# Patient Record
Sex: FEMALE | Race: BLACK OR AFRICAN AMERICAN | NOT HISPANIC OR LATINO | URBAN - METROPOLITAN AREA
[De-identification: names, ages, dates, MRNs, and addresses within clinical notes are randomized per-mention and may not be internally consistent; named-entity substitution may affect disease eponyms.]

---

## 2018-03-12 ENCOUNTER — INPATIENT (INPATIENT)
Facility: HOSPITAL | Age: 68
LOS: 20 days | Discharge: HOME | DRG: 885 | End: 2018-04-02
Attending: PSYCHIATRY & NEUROLOGY | Admitting: PSYCHIATRY & NEUROLOGY
Payer: MEDICARE

## 2018-03-12 VITALS
HEART RATE: 133 BPM | DIASTOLIC BLOOD PRESSURE: 99 MMHG | RESPIRATION RATE: 24 BRPM | SYSTOLIC BLOOD PRESSURE: 137 MMHG | TEMPERATURE: 98 F | OXYGEN SATURATION: 98 %

## 2018-03-12 DIAGNOSIS — F33.9 MAJOR DEPRESSIVE DISORDER, RECURRENT, UNSPECIFIED: ICD-10-CM

## 2018-03-12 DIAGNOSIS — R69 ILLNESS, UNSPECIFIED: ICD-10-CM

## 2018-03-12 DIAGNOSIS — Z98.89 OTHER SPECIFIED POSTPROCEDURAL STATES: Chronic | ICD-10-CM

## 2018-03-12 LAB
ALBUMIN SERPL ELPH-MCNC: 4.1 G/DL — SIGNIFICANT CHANGE UP (ref 3.3–5)
ALP SERPL-CCNC: 73 U/L — SIGNIFICANT CHANGE UP (ref 40–120)
ALT FLD-CCNC: SIGNIFICANT CHANGE UP U/L (ref 10–45)
ANION GAP SERPL CALC-SCNC: 18 MMOL/L — HIGH (ref 5–17)
APPEARANCE UR: (no result)
APTT BLD: 25.3 SEC — LOW (ref 27.5–37.4)
AST SERPL-CCNC: SIGNIFICANT CHANGE UP U/L (ref 10–40)
BACTERIA # UR AUTO: PRESENT /HPF
BASOPHILS NFR BLD AUTO: 0.2 % — SIGNIFICANT CHANGE UP (ref 0–2)
BILIRUB SERPL-MCNC: 0.4 MG/DL — SIGNIFICANT CHANGE UP (ref 0.2–1.2)
BILIRUB UR-MCNC: NEGATIVE — SIGNIFICANT CHANGE UP
BUN SERPL-MCNC: 9 MG/DL — SIGNIFICANT CHANGE UP (ref 7–23)
CALCIUM SERPL-MCNC: 9.7 MG/DL — SIGNIFICANT CHANGE UP (ref 8.4–10.5)
CHLORIDE SERPL-SCNC: 102 MMOL/L — SIGNIFICANT CHANGE UP (ref 96–108)
CO2 SERPL-SCNC: 15 MMOL/L — LOW (ref 22–31)
COLOR SPEC: YELLOW — SIGNIFICANT CHANGE UP
CREAT SERPL-MCNC: 0.78 MG/DL — SIGNIFICANT CHANGE UP (ref 0.5–1.3)
DIFF PNL FLD: NEGATIVE — SIGNIFICANT CHANGE UP
EOSINOPHIL NFR BLD AUTO: 1.6 % — SIGNIFICANT CHANGE UP (ref 0–6)
EPI CELLS # UR: (no result) /HPF (ref 0–5)
GLUCOSE SERPL-MCNC: 103 MG/DL — HIGH (ref 70–99)
GLUCOSE UR QL: NEGATIVE — SIGNIFICANT CHANGE UP
HCT VFR BLD CALC: 42.1 % — SIGNIFICANT CHANGE UP (ref 34.5–45)
HGB BLD-MCNC: 14.5 G/DL — SIGNIFICANT CHANGE UP (ref 11.5–15.5)
INR BLD: 1.1 — SIGNIFICANT CHANGE UP (ref 0.88–1.16)
KETONES UR-MCNC: 15 MG/DL
LEUKOCYTE ESTERASE UR-ACNC: (no result)
LYMPHOCYTES # BLD AUTO: 32.7 % — SIGNIFICANT CHANGE UP (ref 13–44)
MCHC RBC-ENTMCNC: 33.6 PG — SIGNIFICANT CHANGE UP (ref 27–34)
MCHC RBC-ENTMCNC: 34.4 G/DL — SIGNIFICANT CHANGE UP (ref 32–36)
MCV RBC AUTO: 97.7 FL — SIGNIFICANT CHANGE UP (ref 80–100)
MONOCYTES NFR BLD AUTO: 7.2 % — SIGNIFICANT CHANGE UP (ref 2–14)
NEUTROPHILS NFR BLD AUTO: 58.3 % — SIGNIFICANT CHANGE UP (ref 43–77)
NITRITE UR-MCNC: NEGATIVE — SIGNIFICANT CHANGE UP
PH UR: 6 — SIGNIFICANT CHANGE UP (ref 5–8)
PLATELET # BLD AUTO: 433 K/UL — HIGH (ref 150–400)
POTASSIUM SERPL-MCNC: SIGNIFICANT CHANGE UP MMOL/L (ref 3.5–5.3)
POTASSIUM SERPL-SCNC: SIGNIFICANT CHANGE UP MMOL/L (ref 3.5–5.3)
PROT SERPL-MCNC: 8.1 G/DL — SIGNIFICANT CHANGE UP (ref 6–8.3)
PROT UR-MCNC: NEGATIVE MG/DL — SIGNIFICANT CHANGE UP
PROTHROM AB SERPL-ACNC: 12.2 SEC — SIGNIFICANT CHANGE UP (ref 9.8–12.7)
RBC # BLD: 4.31 M/UL — SIGNIFICANT CHANGE UP (ref 3.8–5.2)
RBC # FLD: 15.3 % — SIGNIFICANT CHANGE UP (ref 10.3–16.9)
RBC CASTS # UR COMP ASSIST: < 5 /HPF — SIGNIFICANT CHANGE UP
SODIUM SERPL-SCNC: 135 MMOL/L — SIGNIFICANT CHANGE UP (ref 135–145)
SP GR SPEC: 1.01 — SIGNIFICANT CHANGE UP (ref 1–1.03)
UROBILINOGEN FLD QL: 0.2 E.U./DL — SIGNIFICANT CHANGE UP
WBC # BLD: 8.2 K/UL — SIGNIFICANT CHANGE UP (ref 3.8–10.5)
WBC # FLD AUTO: 8.2 K/UL — SIGNIFICANT CHANGE UP (ref 3.8–10.5)
WBC UR QL: > 10 /HPF

## 2018-03-12 PROCEDURE — 99233 SBSQ HOSP IP/OBS HIGH 50: CPT

## 2018-03-12 PROCEDURE — 93010 ELECTROCARDIOGRAM REPORT: CPT

## 2018-03-12 PROCEDURE — 71045 X-RAY EXAM CHEST 1 VIEW: CPT | Mod: 26

## 2018-03-12 PROCEDURE — 90792 PSYCH DIAG EVAL W/MED SRVCS: CPT

## 2018-03-12 PROCEDURE — 99285 EMERGENCY DEPT VISIT HI MDM: CPT | Mod: 25

## 2018-03-12 RX ORDER — TRAMADOL HYDROCHLORIDE 50 MG/1
50 TABLET ORAL
Qty: 0 | Refills: 0 | Status: DISCONTINUED | OUTPATIENT
Start: 2018-03-12 | End: 2018-03-19

## 2018-03-12 RX ORDER — MEGESTROL ACETATE 40 MG/ML
40 SUSPENSION ORAL DAILY
Qty: 0 | Refills: 0 | Status: DISCONTINUED | OUTPATIENT
Start: 2018-03-12 | End: 2018-03-13

## 2018-03-12 RX ORDER — FLUTICASONE PROPIONATE 50 MCG
1 SPRAY, SUSPENSION NASAL
Qty: 0 | Refills: 0 | Status: DISCONTINUED | OUTPATIENT
Start: 2018-03-12 | End: 2018-04-02

## 2018-03-12 RX ORDER — TIOTROPIUM BROMIDE 18 UG/1
1 CAPSULE ORAL; RESPIRATORY (INHALATION) DAILY
Qty: 0 | Refills: 0 | Status: DISCONTINUED | OUTPATIENT
Start: 2018-03-12 | End: 2018-04-02

## 2018-03-12 RX ORDER — GABAPENTIN 400 MG/1
300 CAPSULE ORAL THREE TIMES A DAY
Qty: 0 | Refills: 0 | Status: DISCONTINUED | OUTPATIENT
Start: 2018-03-12 | End: 2018-03-16

## 2018-03-12 RX ORDER — TRAZODONE HCL 50 MG
100 TABLET ORAL AT BEDTIME
Qty: 0 | Refills: 0 | Status: DISCONTINUED | OUTPATIENT
Start: 2018-03-12 | End: 2018-04-02

## 2018-03-12 RX ORDER — ASCORBIC ACID 60 MG
500 TABLET,CHEWABLE ORAL DAILY
Qty: 0 | Refills: 0 | Status: DISCONTINUED | OUTPATIENT
Start: 2018-03-12 | End: 2018-04-02

## 2018-03-12 RX ORDER — AMLODIPINE BESYLATE 2.5 MG/1
5 TABLET ORAL DAILY
Qty: 0 | Refills: 0 | Status: DISCONTINUED | OUTPATIENT
Start: 2018-03-12 | End: 2018-04-02

## 2018-03-12 RX ORDER — IPRATROPIUM/ALBUTEROL SULFATE 18-103MCG
3 AEROSOL WITH ADAPTER (GRAM) INHALATION
Qty: 0 | Refills: 0 | Status: COMPLETED | OUTPATIENT
Start: 2018-03-12 | End: 2018-03-12

## 2018-03-12 RX ORDER — CELECOXIB 200 MG/1
100 CAPSULE ORAL
Qty: 0 | Refills: 0 | Status: DISCONTINUED | OUTPATIENT
Start: 2018-03-12 | End: 2018-04-02

## 2018-03-12 RX ORDER — BUDESONIDE AND FORMOTEROL FUMARATE DIHYDRATE 160; 4.5 UG/1; UG/1
2 AEROSOL RESPIRATORY (INHALATION)
Qty: 0 | Refills: 0 | Status: DISCONTINUED | OUTPATIENT
Start: 2018-03-12 | End: 2018-04-02

## 2018-03-12 RX ORDER — LORATADINE 10 MG/1
10 TABLET ORAL DAILY
Qty: 0 | Refills: 0 | Status: DISCONTINUED | OUTPATIENT
Start: 2018-03-12 | End: 2018-04-02

## 2018-03-12 RX ORDER — ALBUTEROL 90 UG/1
2 AEROSOL, METERED ORAL EVERY 6 HOURS
Qty: 0 | Refills: 0 | Status: DISCONTINUED | OUTPATIENT
Start: 2018-03-12 | End: 2018-04-02

## 2018-03-12 RX ORDER — SODIUM CHLORIDE 9 MG/ML
1000 INJECTION INTRAMUSCULAR; INTRAVENOUS; SUBCUTANEOUS ONCE
Qty: 0 | Refills: 0 | Status: COMPLETED | OUTPATIENT
Start: 2018-03-12 | End: 2018-03-12

## 2018-03-12 RX ORDER — MIRTAZAPINE 45 MG/1
15 TABLET, ORALLY DISINTEGRATING ORAL AT BEDTIME
Qty: 0 | Refills: 0 | Status: DISCONTINUED | OUTPATIENT
Start: 2018-03-12 | End: 2018-03-14

## 2018-03-12 RX ORDER — ATORVASTATIN CALCIUM 80 MG/1
10 TABLET, FILM COATED ORAL AT BEDTIME
Qty: 0 | Refills: 0 | Status: DISCONTINUED | OUTPATIENT
Start: 2018-03-12 | End: 2018-04-02

## 2018-03-12 RX ORDER — QUETIAPINE FUMARATE 200 MG/1
200 TABLET, FILM COATED ORAL AT BEDTIME
Qty: 0 | Refills: 0 | Status: DISCONTINUED | OUTPATIENT
Start: 2018-03-12 | End: 2018-03-14

## 2018-03-12 RX ADMIN — GABAPENTIN 300 MILLIGRAM(S): 400 CAPSULE ORAL at 14:46

## 2018-03-12 RX ADMIN — Medication 125 MILLIGRAM(S): at 11:36

## 2018-03-12 RX ADMIN — SODIUM CHLORIDE 2000 MILLILITER(S): 9 INJECTION INTRAMUSCULAR; INTRAVENOUS; SUBCUTANEOUS at 11:35

## 2018-03-12 RX ADMIN — Medication 100 MILLIGRAM(S): at 21:58

## 2018-03-12 RX ADMIN — TRAMADOL HYDROCHLORIDE 50 MILLIGRAM(S): 50 TABLET ORAL at 14:46

## 2018-03-12 RX ADMIN — AMLODIPINE BESYLATE 5 MILLIGRAM(S): 2.5 TABLET ORAL at 14:46

## 2018-03-12 RX ADMIN — Medication 500 MILLIGRAM(S): at 14:46

## 2018-03-12 RX ADMIN — ATORVASTATIN CALCIUM 10 MILLIGRAM(S): 80 TABLET, FILM COATED ORAL at 21:58

## 2018-03-12 RX ADMIN — MIRTAZAPINE 15 MILLIGRAM(S): 45 TABLET, ORALLY DISINTEGRATING ORAL at 21:58

## 2018-03-12 RX ADMIN — Medication 3 MILLILITER(S): at 11:36

## 2018-03-12 RX ADMIN — GABAPENTIN 300 MILLIGRAM(S): 400 CAPSULE ORAL at 21:58

## 2018-03-12 RX ADMIN — Medication 3 MILLILITER(S): at 12:24

## 2018-03-12 RX ADMIN — Medication 3 MILLILITER(S): at 12:04

## 2018-03-12 RX ADMIN — QUETIAPINE FUMARATE 200 MILLIGRAM(S): 200 TABLET, FILM COATED ORAL at 21:58
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
## 2018-03-12 NOTE — ED ADULT TRIAGE NOTE - OTHER COMPLAINTS
Patient reports suicidal ideations, placed on 1:1. Denies any chest pain. Reports decreased appetite and generalized weakness.

## 2018-03-12 NOTE — ED PROVIDER NOTE - PHYSICAL EXAMINATION
VITAL SIGNS: I have reviewed nursing notes and confirm.  CONSTITUTIONAL: Well-developed; well-nourished thin female sitting up in stretcher, tearful, though appropriately following commands, non-toxic; in no acute distress.  SKIN: Agree with RN documentation regarding decubitus evaluation. Remainder of skin exam is warm and dry, no acute rash.  HEAD: Normocephalic; atraumatic.  EYES: PERRL, EOM intact; conjunctiva and sclera clear.  ENT: No nasal discharge; airway clear.  NECK: Supple; non tender.  CARD: S1, S2 normal; no murmurs, gallops, or rubs. + tachycardia to 110s  RESP: CTA b/l w/good excursion, no accessory muscle recruitment  ABD: Normal bowel sounds; soft; non-distended; non-tender  EXT: Normal ROM. No clubbing, cyanosis or edema.  LYMPH: No acute cervical adenopathy.  NEURO: Alert, oriented. Grossly unremarkable.  PSYCH: Cooperative, appropriate.

## 2018-03-12 NOTE — ED BEHAVIORAL HEALTH ASSESSMENT NOTE - AXIS IV
Economic problems/Problems with primary support/Problem related to social environment/Educational problems/Occupational problems/Other psychosocial and environmental problems

## 2018-03-12 NOTE — ED ADULT NURSE NOTE - CHPI ED SYMPTOMS NEG
no wheezing/no edema/no fever/no body aches/no chest pain/no headache/no chills/no diaphoresis/no hemoptysis/no cough

## 2018-03-12 NOTE — ED BEHAVIORAL HEALTH ASSESSMENT NOTE - PSYCHIATRIC ISSUES AND PLAN (INCLUDE STANDING AND PRN MEDICATION)
Remeron 15 mg qhs, seroquel 200 mg qhs. Contact [facility] for collateral info
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
## 2018-03-12 NOTE — ED ADULT NURSE NOTE - OBJECTIVE STATEMENT
67y F, A&ox3, tearful and anxious, with times of hyper ventilation, anxious appearing, presents to ed for depression and "wanting to speak to psychiatrist I just don't want to live." Pt on 1:1. Pt reports "My mother  and I was going through her stuff." Reports "I also left my copd meds at home. Denies cp, n/v, fever, chills, abdominal pain, urinary s/s. LUngs clear bilateral. No jvd, no edema. EKG performed, Labs drawn. Pending evaluation. reports decreased po intake, decreased activity, depression, denies HI. No drug use nor etoh use. Will continue to monitor.

## 2018-03-12 NOTE — ED BEHAVIORAL HEALTH ASSESSMENT NOTE - OTHER PAST PSYCHIATRIC HISTORY (INCLUDE DETAILS REGARDING ONSET, COURSE OF ILLNESS, INPATIENT/OUTPATIENT TREATMENT)
Most recent admission to [facility] in Nov-Dec 2017  Admission on [facility] in late March '16 for a week for suicidal ideation and depression.  Pt states she stopped eating in the past as a passive way to die

## 2018-03-12 NOTE — ED PROVIDER NOTE - OBJECTIVE STATEMENT
68 y/o F w/hx MDD w/several psych admissions on mirtazipine/quetiapine, mild COPD (former smoker), chronic back pain w/several fusion surgeries on tramadol, p/w feelings of anhedonia, lack of appetite, feeling depressed and having thoughts of suicide. No direct plan. No AH/VH. No drug use/alcohol use. She states that she has baseline exertional SOB related to her COPD. Denies SOB or CP at this time.

## 2018-03-12 NOTE — ED BEHAVIORAL HEALTH ASSESSMENT NOTE - HPI (INCLUDE ILLNESS QUALITY, SEVERITY, DURATION, TIMING, CONTEXT, MODIFYING FACTORS, ASSOCIATED SIGNS AND SYMPTOMS)
Pt is a 66 yo single  female domiciled alone since her mother passed away in 12/15, with long history of depression and anxiety, several past psychiatric admissions, most recent one at [facility] in 2017, prior admission to [facility] in  now presents stating she has been feeling progressively more depressed since January. Pt states that she stopped eating about two weeks ago, hoping she would die. She has been having difficulty getting out of bed going to the bathroom. "I know things are getting bad when I have to negotiate with myself if I should get up to go to the bathroom". Pt is unable to identify reasons for living. She states she took care of her mother (who  at [facility] at the age of 95) for twenty years which isolated her. She states she is "alone" and does not have any friends or family. Pt reports poor sleep, poor energy level, amotivation, worthlessness. She states she wants to be in a "safe place". There is no evidence of manic or psychotic symptoms. Pt however is somewhat oddly related and appears severely underweight.   Pt identifies going through her mother's belongings as a triggering event. She also identifies her social isolation to be a contributor to her depression.     Pt states that she had one intake appt at the [facility] after being discharged from [facility]. She however has not met with her regular treatment team as of now.

## 2018-03-12 NOTE — ED PROVIDER NOTE - MEDICAL DECISION MAKING DETAILS
+ MDD w/SI, not concerned for intoxication, lungs clear w/unremarkable CXR (no infiltrates or effusions), medically cleared, seen by psych and accepted to the inpt psych sol for severe depressive episode.

## 2018-03-12 NOTE — ED BEHAVIORAL HEALTH ASSESSMENT NOTE - SUICIDE RISK FACTORS
History of abuse/trauma/Mood episode/Access to means (pills, firearms, etc.)/None Known/Hopelessness/Global insomnia/Anhedonia

## 2018-03-12 NOTE — ED ADULT NURSE NOTE - PMH
Back pain    Depression    Emphysema lung    Hyperlipidemia    Hypertension    Hypokalemia    Knee pain, chronic    Lung nodules

## 2018-03-13 PROCEDURE — 99231 SBSQ HOSP IP/OBS SF/LOW 25: CPT

## 2018-03-13 RX ORDER — MEGESTROL ACETATE 40 MG/ML
400 SUSPENSION ORAL DAILY
Qty: 0 | Refills: 0 | Status: DISCONTINUED | OUTPATIENT
Start: 2018-03-13 | End: 2018-03-15

## 2018-03-13 RX ADMIN — GABAPENTIN 300 MILLIGRAM(S): 400 CAPSULE ORAL at 22:00

## 2018-03-13 RX ADMIN — CELECOXIB 100 MILLIGRAM(S): 200 CAPSULE ORAL at 22:02

## 2018-03-13 RX ADMIN — ATORVASTATIN CALCIUM 10 MILLIGRAM(S): 80 TABLET, FILM COATED ORAL at 22:01

## 2018-03-13 RX ADMIN — TRAMADOL HYDROCHLORIDE 50 MILLIGRAM(S): 50 TABLET ORAL at 06:02

## 2018-03-13 RX ADMIN — Medication 500 MILLIGRAM(S): at 10:43

## 2018-03-13 RX ADMIN — CELECOXIB 100 MILLIGRAM(S): 200 CAPSULE ORAL at 07:42

## 2018-03-13 RX ADMIN — AMLODIPINE BESYLATE 5 MILLIGRAM(S): 2.5 TABLET ORAL at 06:02

## 2018-03-13 RX ADMIN — GABAPENTIN 300 MILLIGRAM(S): 400 CAPSULE ORAL at 13:09

## 2018-03-13 RX ADMIN — QUETIAPINE FUMARATE 200 MILLIGRAM(S): 200 TABLET, FILM COATED ORAL at 22:01

## 2018-03-13 RX ADMIN — Medication 100 MILLIGRAM(S): at 22:00

## 2018-03-13 RX ADMIN — MIRTAZAPINE 15 MILLIGRAM(S): 45 TABLET, ORALLY DISINTEGRATING ORAL at 22:01

## 2018-03-13 RX ADMIN — BUDESONIDE AND FORMOTEROL FUMARATE DIHYDRATE 2 PUFF(S): 160; 4.5 AEROSOL RESPIRATORY (INHALATION) at 18:32

## 2018-03-13 RX ADMIN — CELECOXIB 100 MILLIGRAM(S): 200 CAPSULE ORAL at 08:12

## 2018-03-13 RX ADMIN — GABAPENTIN 300 MILLIGRAM(S): 400 CAPSULE ORAL at 06:02

## 2018-03-13 RX ADMIN — TRAMADOL HYDROCHLORIDE 50 MILLIGRAM(S): 50 TABLET ORAL at 07:02

## 2018-03-13 RX ADMIN — CELECOXIB 100 MILLIGRAM(S): 200 CAPSULE ORAL at 23:03

## 2018-03-13 RX ADMIN — BUDESONIDE AND FORMOTEROL FUMARATE DIHYDRATE 2 PUFF(S): 160; 4.5 AEROSOL RESPIRATORY (INHALATION) at 22:06
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
## 2018-03-13 NOTE — BEHAVIORAL HEALTH ASSESSMENT NOTE - NSBHCHARTREVIEWVS_PSY_A_CORE FT
Vital Signs Last 24 Hrs  T(C): 37 (13 Mar 2018 17:00), Max: 37 (13 Mar 2018 17:00)  T(F): 98.6 (13 Mar 2018 17:00), Max: 98.6 (13 Mar 2018 17:00)  HR: 99 (13 Mar 2018 17:00) (99 - 117)  BP: 124/81 (13 Mar 2018 17:00) (124/81 - 130/79)  BP(mean): --  RR: 18 (13 Mar 2018 17:00) (18 - 20)  SpO2: --

## 2018-03-13 NOTE — BEHAVIORAL HEALTH ASSESSMENT NOTE - NSBHADMITIPSTRENGTH_PSY_A_CORE
Has access to housing/residential stability/Good impulse control/Cooperative with treatment/Intelligent/Knowledge of medications/Motivated and ready for change

## 2018-03-13 NOTE — BEHAVIORAL HEALTH ASSESSMENT NOTE - NSBHCHARTREVIEWLAB_PSY_A_CORE FT
14.5   8.2   )-----------( 433      ( 12 Mar 2018 10:46 )               42.1       03-12    135  |  102  |  9   ----------------------------<  103<H>  see note   |  15<L>  |  0.78    Ca    9.7      12 Mar 2018 10:46    TPro  8.1  /  Alb  4.1  /  TBili  0.4  /  DBili  x   /  AST  see note  /  ALT  see note  /  AlkPhos  73  03-12
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
## 2018-03-13 NOTE — BEHAVIORAL HEALTH ASSESSMENT NOTE - HPI (INCLUDE ILLNESS QUALITY, SEVERITY, DURATION, TIMING, CONTEXT, MODIFYING FACTORS, ASSOCIATED SIGNS AND SYMPTOMS)
Pt is a 68 yo single  female domiciled alone since her mother passed away in 12/15, with long history of depression and anxiety, several past psychiatric admissions, most recent one at [facility] in 2017, prior admission to [facility] in  now presents stating she has been feeling progressively more depressed since January. Pt states that she stopped eating about two weeks ago, hoping she would die. She has been having difficulty getting out of bed going to the bathroom. "I know things are getting bad when I have to negotiate with myself if I should get up to go to the bathroom". Pt is unable to identify reasons for living. She states she took care of her mother (who  at [facility] at the age of 95) for twenty years which isolated her. She states she is "alone" and does not have any friends or family. Pt reports poor sleep, poor energy level, amotivation, worthlessness. She states she wants to be in a "safe place". There is no evidence of manic or psychotic symptoms. Pt however is somewhat oddly related and appears severely underweight.   Pt identifies going through her mother's belongings as a triggering event. She also identifies her social isolation to be a contributor to her depression.     Pt states that she had one intake appt at the [facility] after being discharged from [facility]. She however has not met with her regular treatment team as of now.

## 2018-03-14 DIAGNOSIS — J43.9 EMPHYSEMA, UNSPECIFIED: ICD-10-CM

## 2018-03-14 DIAGNOSIS — M54.9 DORSALGIA, UNSPECIFIED: ICD-10-CM

## 2018-03-14 DIAGNOSIS — E78.5 HYPERLIPIDEMIA, UNSPECIFIED: ICD-10-CM

## 2018-03-14 DIAGNOSIS — I10 ESSENTIAL (PRIMARY) HYPERTENSION: ICD-10-CM

## 2018-03-14 PROCEDURE — 99231 SBSQ HOSP IP/OBS SF/LOW 25: CPT

## 2018-03-14 PROCEDURE — 99222 1ST HOSP IP/OBS MODERATE 55: CPT

## 2018-03-14 RX ORDER — FOLIC ACID 0.8 MG
1 TABLET ORAL DAILY
Qty: 0 | Refills: 0 | Status: DISCONTINUED | OUTPATIENT
Start: 2018-03-14 | End: 2018-04-02

## 2018-03-14 RX ORDER — QUETIAPINE FUMARATE 200 MG/1
150 TABLET, FILM COATED ORAL AT BEDTIME
Qty: 0 | Refills: 0 | Status: DISCONTINUED | OUTPATIENT
Start: 2018-03-14 | End: 2018-03-16

## 2018-03-14 RX ORDER — QUETIAPINE FUMARATE 200 MG/1
50 TABLET, FILM COATED ORAL
Qty: 0 | Refills: 0 | Status: DISCONTINUED | OUTPATIENT
Start: 2018-03-14 | End: 2018-03-16

## 2018-03-14 RX ORDER — THIAMINE MONONITRATE (VIT B1) 100 MG
100 TABLET ORAL DAILY
Qty: 0 | Refills: 0 | Status: DISCONTINUED | OUTPATIENT
Start: 2018-03-14 | End: 2018-04-02

## 2018-03-14 RX ADMIN — ATORVASTATIN CALCIUM 10 MILLIGRAM(S): 80 TABLET, FILM COATED ORAL at 21:30

## 2018-03-14 RX ADMIN — GABAPENTIN 300 MILLIGRAM(S): 400 CAPSULE ORAL at 21:30

## 2018-03-14 RX ADMIN — Medication 500 MILLIGRAM(S): at 16:18

## 2018-03-14 RX ADMIN — Medication 1 MILLIGRAM(S): at 16:18

## 2018-03-14 RX ADMIN — Medication 100 MILLIGRAM(S): at 21:31

## 2018-03-14 RX ADMIN — BUDESONIDE AND FORMOTEROL FUMARATE DIHYDRATE 2 PUFF(S): 160; 4.5 AEROSOL RESPIRATORY (INHALATION) at 16:20

## 2018-03-14 RX ADMIN — GABAPENTIN 300 MILLIGRAM(S): 400 CAPSULE ORAL at 06:53

## 2018-03-14 RX ADMIN — Medication 100 MILLIGRAM(S): at 16:18

## 2018-03-14 RX ADMIN — TRAMADOL HYDROCHLORIDE 50 MILLIGRAM(S): 50 TABLET ORAL at 16:18

## 2018-03-14 RX ADMIN — TRAMADOL HYDROCHLORIDE 50 MILLIGRAM(S): 50 TABLET ORAL at 16:45

## 2018-03-14 RX ADMIN — MEGESTROL ACETATE 400 MILLIGRAM(S): 40 SUSPENSION ORAL at 16:16

## 2018-03-14 RX ADMIN — Medication 1 TABLET(S): at 16:18

## 2018-03-14 RX ADMIN — CELECOXIB 100 MILLIGRAM(S): 200 CAPSULE ORAL at 06:53

## 2018-03-14 RX ADMIN — CELECOXIB 100 MILLIGRAM(S): 200 CAPSULE ORAL at 07:37

## 2018-03-14 RX ADMIN — QUETIAPINE FUMARATE 150 MILLIGRAM(S): 200 TABLET, FILM COATED ORAL at 21:31

## 2018-03-14 RX ADMIN — AMLODIPINE BESYLATE 5 MILLIGRAM(S): 2.5 TABLET ORAL at 06:53

## 2018-03-14 RX ADMIN — QUETIAPINE FUMARATE 50 MILLIGRAM(S): 200 TABLET, FILM COATED ORAL at 21:31

## 2018-03-14 RX ADMIN — CELECOXIB 100 MILLIGRAM(S): 200 CAPSULE ORAL at 16:17

## 2018-03-14 RX ADMIN — CELECOXIB 100 MILLIGRAM(S): 200 CAPSULE ORAL at 16:45

## 2018-03-14 NOTE — PROGRESS NOTE BEHAVIORAL HEALTH - PROBLEM SELECTOR PLAN 1
-Discontinued Remeron 15 mg as patient was complaining of sedation  -Changed Seroquel to 50 mg q1pm and 150 mg qHS  -Continue Trazodone 100 mg qHS  -Continue megestrol 40 mg for appetite stimulation and multivitamin, thiamine, and folic acid for malnourishment

## 2018-03-14 NOTE — PROGRESS NOTE BEHAVIORAL HEALTH - SUMMARY
66 yo  female, domiciled alone with past psychiatric history of MDD and anxiety admitted for worsening depression and passive suicidal ideation through decreased PO intake. She is still depressed and endorsing anhedonia. Patient continues to warrant inpatient hospitalization for medication management, symptom stabilization, and safety. Given depression, will titrate Seroquel dose and monitor for side effects. Education given regarding compliance with treatment plan and good behavior. Will encourage verbalization of feelings and alternative coping skills.

## 2018-03-14 NOTE — PROGRESS NOTE BEHAVIORAL HEALTH - NSBHFUPINTERVALHXFT_PSY_A_CORE
Case was discussed with treatment team and chart was reviewed.  Patient was compliant with all meds. Case was discussed with treatment team and chart was reviewed.  Patient was compliant with all meds. As per nursing report, patient continues to have poor PO intake, but it has slightly improved. Nutrition consult was placed. Patient reports that she was able to eat more at breakfast and lunch today. However, she has been feeling more tired this afternoon even though she slept well last night and finds it difficult to concentrate in groups. She describes her current mood as depressed and afraid, and she finds herself getting tearful without any triggers. She denies active suicidal ideation and has improved insight regarding her PO intake. Denies any manic/hypomanic symptoms, paranoid thoughts/delusions, perceptual disturbances, HI.    Medical problems: Medicine consult was placed to review patient's medications and make adjustments as necessary. Patient's right wrist active ROM has slightly improved, but she is still unable to actively flex her fingers. Radial pulse is intact and cap refill is brisk.

## 2018-03-14 NOTE — PROGRESS NOTE BEHAVIORAL HEALTH - RISK ASSESSMENT
Patient recently lost her mother and is socially isolated. On the unit, her PO intake has improved, but she still endorses significant depression and is not future-oriented.

## 2018-03-14 NOTE — PROGRESS NOTE BEHAVIORAL HEALTH - NSBHCHARTREVIEWVS_PSY_A_CORE FT
Vital Signs Last 24 Hrs  T(C): 36.4 (14 Mar 2018 07:04), Max: 37 (13 Mar 2018 17:00)  T(F): 97.5 (14 Mar 2018 07:04), Max: 98.6 (13 Mar 2018 17:00)  HR: 117 (14 Mar 2018 07:04) (99 - 117)  BP: 121/82 (14 Mar 2018 07:04) (121/82 - 130/79)  BP(mean): --  RR: 18 (14 Mar 2018 07:04) (18 - 20)  SpO2: --

## 2018-03-15 LAB
ANION GAP SERPL CALC-SCNC: 16 MMOL/L — SIGNIFICANT CHANGE UP (ref 5–17)
BUN SERPL-MCNC: 5 MG/DL — LOW (ref 7–23)
CALCIUM SERPL-MCNC: 9.4 MG/DL — SIGNIFICANT CHANGE UP (ref 8.4–10.5)
CHLORIDE SERPL-SCNC: 104 MMOL/L — SIGNIFICANT CHANGE UP (ref 96–108)
CHOLEST SERPL-MCNC: 96 MG/DL — SIGNIFICANT CHANGE UP (ref 10–199)
CO2 SERPL-SCNC: 21 MMOL/L — LOW (ref 22–31)
CREAT SERPL-MCNC: 0.54 MG/DL — SIGNIFICANT CHANGE UP (ref 0.5–1.3)
GLUCOSE SERPL-MCNC: 102 MG/DL — HIGH (ref 70–99)
HBA1C BLD-MCNC: 5.3 % — SIGNIFICANT CHANGE UP (ref 4–5.6)
HDLC SERPL-MCNC: 30 MG/DL — LOW (ref 40–125)
LIPID PNL WITH DIRECT LDL SERPL: 51 MG/DL — SIGNIFICANT CHANGE UP
MAGNESIUM SERPL-MCNC: 1.6 MG/DL — SIGNIFICANT CHANGE UP (ref 1.6–2.6)
PHOSPHATE SERPL-MCNC: 2.6 MG/DL — SIGNIFICANT CHANGE UP (ref 2.5–4.5)
POTASSIUM SERPL-MCNC: 3.2 MMOL/L — LOW (ref 3.5–5.3)
POTASSIUM SERPL-SCNC: 3.2 MMOL/L — LOW (ref 3.5–5.3)
SODIUM SERPL-SCNC: 141 MMOL/L — SIGNIFICANT CHANGE UP (ref 135–145)
TOTAL CHOLESTEROL/HDL RATIO MEASUREMENT: 3.2 RATIO — LOW (ref 3.3–7.1)
TRIGL SERPL-MCNC: 75 MG/DL — SIGNIFICANT CHANGE UP (ref 10–149)

## 2018-03-15 PROCEDURE — 99231 SBSQ HOSP IP/OBS SF/LOW 25: CPT

## 2018-03-15 PROCEDURE — 99232 SBSQ HOSP IP/OBS MODERATE 35: CPT

## 2018-03-15 RX ORDER — ERGOCALCIFEROL 1.25 MG/1
50000 CAPSULE ORAL
Qty: 0 | Refills: 0 | Status: DISCONTINUED | OUTPATIENT
Start: 2018-03-15 | End: 2018-04-02

## 2018-03-15 RX ORDER — METOPROLOL TARTRATE 50 MG
25 TABLET ORAL DAILY
Qty: 0 | Refills: 0 | Status: DISCONTINUED | OUTPATIENT
Start: 2018-03-15 | End: 2018-04-02

## 2018-03-15 RX ORDER — POTASSIUM CHLORIDE 20 MEQ
40 PACKET (EA) ORAL EVERY 4 HOURS
Qty: 0 | Refills: 0 | Status: COMPLETED | OUTPATIENT
Start: 2018-03-15 | End: 2018-03-15

## 2018-03-15 RX ORDER — MEGESTROL ACETATE 40 MG/ML
400 SUSPENSION ORAL DAILY
Qty: 0 | Refills: 0 | Status: DISCONTINUED | OUTPATIENT
Start: 2018-03-15 | End: 2018-04-02

## 2018-03-15 RX ADMIN — Medication 100 MILLIGRAM(S): at 22:02

## 2018-03-15 RX ADMIN — CELECOXIB 100 MILLIGRAM(S): 200 CAPSULE ORAL at 05:50

## 2018-03-15 RX ADMIN — Medication 1 MILLIGRAM(S): at 11:11

## 2018-03-15 RX ADMIN — CELECOXIB 100 MILLIGRAM(S): 200 CAPSULE ORAL at 15:32

## 2018-03-15 RX ADMIN — Medication 500 MILLIGRAM(S): at 11:11

## 2018-03-15 RX ADMIN — TRAMADOL HYDROCHLORIDE 50 MILLIGRAM(S): 50 TABLET ORAL at 05:41

## 2018-03-15 RX ADMIN — Medication 40 MILLIEQUIVALENT(S): at 11:13

## 2018-03-15 RX ADMIN — Medication 100 MILLIGRAM(S): at 11:13

## 2018-03-15 RX ADMIN — MEGESTROL ACETATE 400 MILLIGRAM(S): 40 SUSPENSION ORAL at 13:14

## 2018-03-15 RX ADMIN — GABAPENTIN 300 MILLIGRAM(S): 400 CAPSULE ORAL at 22:01

## 2018-03-15 RX ADMIN — QUETIAPINE FUMARATE 50 MILLIGRAM(S): 200 TABLET, FILM COATED ORAL at 15:32

## 2018-03-15 RX ADMIN — Medication 1 TABLET(S): at 11:11

## 2018-03-15 RX ADMIN — CELECOXIB 100 MILLIGRAM(S): 200 CAPSULE ORAL at 07:00

## 2018-03-15 RX ADMIN — Medication 40 MILLIEQUIVALENT(S): at 15:32

## 2018-03-15 RX ADMIN — TRAMADOL HYDROCHLORIDE 50 MILLIGRAM(S): 50 TABLET ORAL at 06:00

## 2018-03-15 RX ADMIN — GABAPENTIN 300 MILLIGRAM(S): 400 CAPSULE ORAL at 13:15

## 2018-03-15 RX ADMIN — ATORVASTATIN CALCIUM 10 MILLIGRAM(S): 80 TABLET, FILM COATED ORAL at 22:01

## 2018-03-15 RX ADMIN — CELECOXIB 100 MILLIGRAM(S): 200 CAPSULE ORAL at 16:02

## 2018-03-15 RX ADMIN — TRAMADOL HYDROCHLORIDE 50 MILLIGRAM(S): 50 TABLET ORAL at 11:43

## 2018-03-15 RX ADMIN — BUDESONIDE AND FORMOTEROL FUMARATE DIHYDRATE 2 PUFF(S): 160; 4.5 AEROSOL RESPIRATORY (INHALATION) at 11:14

## 2018-03-15 RX ADMIN — BUDESONIDE AND FORMOTEROL FUMARATE DIHYDRATE 2 PUFF(S): 160; 4.5 AEROSOL RESPIRATORY (INHALATION) at 22:02

## 2018-03-15 RX ADMIN — TRAMADOL HYDROCHLORIDE 50 MILLIGRAM(S): 50 TABLET ORAL at 11:13

## 2018-03-15 RX ADMIN — GABAPENTIN 300 MILLIGRAM(S): 400 CAPSULE ORAL at 05:45

## 2018-03-15 RX ADMIN — QUETIAPINE FUMARATE 150 MILLIGRAM(S): 200 TABLET, FILM COATED ORAL at 22:01

## 2018-03-15 RX ADMIN — AMLODIPINE BESYLATE 5 MILLIGRAM(S): 2.5 TABLET ORAL at 05:45

## 2018-03-15 NOTE — PROGRESS NOTE BEHAVIORAL HEALTH - NSBHCHARTREVIEWVS_PSY_A_CORE FT
Vital Signs Last 24 Hrs  T(C): 36.7 (15 Mar 2018 06:02), Max: 36.8 (14 Mar 2018 09:30)  T(F): 98.1 (15 Mar 2018 06:02), Max: 98.2 (14 Mar 2018 09:30)  HR: 109 (15 Mar 2018 06:02) (109 - 114)  BP: 139/84 (15 Mar 2018 06:02) (132/86 - 139/84)  BP(mean): --  RR: 18 (15 Mar 2018 06:02) (18 - 18)  SpO2: --

## 2018-03-15 NOTE — PROGRESS NOTE BEHAVIORAL HEALTH - SUMMARY
66 yo  female, domiciled alone with past psychiatric history of MDD and anxiety admitted for worsening depression and passive suicidal ideation through decreased PO intake. Her mood is improving, but she is preoccupied with her chronic pain issues. Patient continues to warrant inpatient hospitalization for medication management, symptom stabilization, and safety.

## 2018-03-15 NOTE — PROGRESS NOTE BEHAVIORAL HEALTH - PROBLEM SELECTOR PLAN 1
-Continue Seroquel 50 mg q1pm and 150 mg qHS  -Continue Trazodone 100 mg qHS  -Continue megestrol 40 mg for appetite stimulation and multivitamin, thiamine, and folic acid for malnourishment

## 2018-03-15 NOTE — PROGRESS NOTE BEHAVIORAL HEALTH - NSBHFUPINTERVALHXFT_PSY_A_CORE
Case was discussed with treatment team and chart was reviewed.  Patient was compliant with all meds. As per nursing report, patient's PO intake has increased significantly. She reports better mood and improved sleep. She is preoccupied with her chronic back pain and exhibits medication-seeking behavior in regards to increasing Tramadol. Denies any manic/hypomanic symptoms, paranoid thoughts/delusions, perceptual disturbances, SI/HI.

## 2018-03-16 PROCEDURE — 99231 SBSQ HOSP IP/OBS SF/LOW 25: CPT

## 2018-03-16 RX ORDER — QUETIAPINE FUMARATE 200 MG/1
200 TABLET, FILM COATED ORAL AT BEDTIME
Qty: 0 | Refills: 0 | Status: DISCONTINUED | OUTPATIENT
Start: 2018-03-16 | End: 2018-03-23

## 2018-03-16 RX ORDER — GABAPENTIN 400 MG/1
100 CAPSULE ORAL
Qty: 0 | Refills: 0 | Status: DISCONTINUED | OUTPATIENT
Start: 2018-03-16 | End: 2018-03-23

## 2018-03-16 RX ORDER — GABAPENTIN 400 MG/1
300 CAPSULE ORAL AT BEDTIME
Qty: 0 | Refills: 0 | Status: DISCONTINUED | OUTPATIENT
Start: 2018-03-16 | End: 2018-03-23

## 2018-03-16 RX ADMIN — Medication 100 MILLIGRAM(S): at 21:49

## 2018-03-16 RX ADMIN — Medication 500 MILLIGRAM(S): at 13:34

## 2018-03-16 RX ADMIN — QUETIAPINE FUMARATE 200 MILLIGRAM(S): 200 TABLET, FILM COATED ORAL at 21:47

## 2018-03-16 RX ADMIN — TRAMADOL HYDROCHLORIDE 50 MILLIGRAM(S): 50 TABLET ORAL at 06:17

## 2018-03-16 RX ADMIN — Medication 1 TABLET(S): at 10:03

## 2018-03-16 RX ADMIN — Medication 25 MILLIGRAM(S): at 10:03

## 2018-03-16 RX ADMIN — BUDESONIDE AND FORMOTEROL FUMARATE DIHYDRATE 2 PUFF(S): 160; 4.5 AEROSOL RESPIRATORY (INHALATION) at 21:49

## 2018-03-16 RX ADMIN — GABAPENTIN 100 MILLIGRAM(S): 400 CAPSULE ORAL at 13:38

## 2018-03-16 RX ADMIN — ERGOCALCIFEROL 50000 UNIT(S): 1.25 CAPSULE ORAL at 10:03

## 2018-03-16 RX ADMIN — Medication 100 MILLIGRAM(S): at 10:03

## 2018-03-16 RX ADMIN — TIOTROPIUM BROMIDE 1 CAPSULE(S): 18 CAPSULE ORAL; RESPIRATORY (INHALATION) at 14:29

## 2018-03-16 RX ADMIN — CELECOXIB 100 MILLIGRAM(S): 200 CAPSULE ORAL at 06:19

## 2018-03-16 RX ADMIN — Medication 1 MILLIGRAM(S): at 10:02

## 2018-03-16 RX ADMIN — CELECOXIB 100 MILLIGRAM(S): 200 CAPSULE ORAL at 16:10

## 2018-03-16 RX ADMIN — CELECOXIB 100 MILLIGRAM(S): 200 CAPSULE ORAL at 06:16

## 2018-03-16 RX ADMIN — BUDESONIDE AND FORMOTEROL FUMARATE DIHYDRATE 2 PUFF(S): 160; 4.5 AEROSOL RESPIRATORY (INHALATION) at 10:13

## 2018-03-16 RX ADMIN — AMLODIPINE BESYLATE 5 MILLIGRAM(S): 2.5 TABLET ORAL at 06:17

## 2018-03-16 RX ADMIN — TRAMADOL HYDROCHLORIDE 50 MILLIGRAM(S): 50 TABLET ORAL at 06:19

## 2018-03-16 RX ADMIN — GABAPENTIN 300 MILLIGRAM(S): 400 CAPSULE ORAL at 21:46

## 2018-03-16 RX ADMIN — Medication 1 SPRAY(S): at 21:51

## 2018-03-16 RX ADMIN — ATORVASTATIN CALCIUM 10 MILLIGRAM(S): 80 TABLET, FILM COATED ORAL at 21:46

## 2018-03-16 RX ADMIN — CELECOXIB 100 MILLIGRAM(S): 200 CAPSULE ORAL at 17:05

## 2018-03-16 RX ADMIN — MEGESTROL ACETATE 400 MILLIGRAM(S): 40 SUSPENSION ORAL at 10:04

## 2018-03-16 NOTE — PROGRESS NOTE BEHAVIORAL HEALTH - NSBHFUPINTERVALHXFT_PSY_A_CORE
Case was discussed with treatment team and chart was reviewed.  Patient was compliant with all meds. As per nursing report, patient has been social with peers. She reports okay mood, but was upset that her gabapentin was not yet adjusted. She believes that it is sedating her throughout the day, so wanted to decrease the morning and afternoon doses. Her appetite has improved and she is drinking Ensure with meals. She reports that she woke up at 4am and was unable to fall back asleep. Denies any manic/hypomanic symptoms, paranoid thoughts/delusions, perceptual disturbances, SI/HI.

## 2018-03-16 NOTE — PROGRESS NOTE BEHAVIORAL HEALTH - SUMMARY
66 yo  female, domiciled alone with past psychiatric history of MDD and anxiety admitted for worsening depression and passive suicidal ideation through decreased PO intake. Her mood is improving, but she is preoccupied with her chronic pain issues. Patient continues to warrant inpatient hospitalization for medication management, symptom stabilization, and safety. 66 yo  female, domiciled alone with past psychiatric history of MDD and anxiety admitted for worsening depression and passive suicidal ideation through decreased PO intake. She is improving but she is still somatically preoccupied. Patient continues to warrant inpatient hospitalization for medication management, symptom stabilization, and safety.

## 2018-03-16 NOTE — PROGRESS NOTE BEHAVIORAL HEALTH - PROBLEM SELECTOR PLAN 3
-Continue tramadol, celecoxib, and gabapentin  -Will obtain pain management consult tomorrow -Continue tramadol and celecoxib  -Changed Gabapentin to 100 mg q9am, q1pm and 300 mg qHS

## 2018-03-16 NOTE — PROGRESS NOTE BEHAVIORAL HEALTH - PROBLEM SELECTOR PLAN 1
-Continue Seroquel 50 mg q1pm and 150 mg qHS  -Continue Trazodone 100 mg qHS  -Continue megestrol 40 mg for appetite stimulation and multivitamin, thiamine, and folic acid for malnourishment -Changed Seroquel to 200 mg qHS  -Continue Trazodone 100 mg qHS  -Continue megestrol 40 mg for appetite stimulation and multivitamin, Vitamin D, Vitamin C, thiamine, and folic acid for malnourishment

## 2018-03-16 NOTE — PROGRESS NOTE BEHAVIORAL HEALTH - RISK ASSESSMENT
Patient recently lost her mother and is socially isolated. On the unit, her PO intake has improved, but she still endorses significant depression and is not future-oriented. Patient recently lost her mother and is socially isolated. On the unit, her PO intake has improved, but she still endorses significant depression.

## 2018-03-16 NOTE — PROGRESS NOTE BEHAVIORAL HEALTH - NSBHCHARTREVIEWVS_PSY_A_CORE FT
Vital Signs Last 24 Hrs  T(C): 36.9 (16 Mar 2018 09:53), Max: 37.4 (16 Mar 2018 06:00)  T(F): 98.5 (16 Mar 2018 09:53), Max: 99.3 (16 Mar 2018 06:00)  HR: 123 (16 Mar 2018 09:53) (101 - 123)  BP: 130/86 (16 Mar 2018 09:53) (123/78 - 130/86)  BP(mean): --  RR: 20 (16 Mar 2018 09:53) (16 - 20)  SpO2: --

## 2018-03-16 NOTE — PROGRESS NOTE BEHAVIORAL HEALTH - PROBLEM SELECTOR PLAN 4
-Continue Budesonide, Spiriva, Albuterol  -Continue Loratadine and Fluticasone for allergies -Continue Symbicort, Spiriva, Albuterol  -Continue Loratadine and Fluticasone for allergies

## 2018-03-17 RX ADMIN — Medication 100 MILLIGRAM(S): at 21:56

## 2018-03-17 RX ADMIN — Medication 1 SPRAY(S): at 06:29

## 2018-03-17 RX ADMIN — TRAMADOL HYDROCHLORIDE 50 MILLIGRAM(S): 50 TABLET ORAL at 06:28

## 2018-03-17 RX ADMIN — BUDESONIDE AND FORMOTEROL FUMARATE DIHYDRATE 2 PUFF(S): 160; 4.5 AEROSOL RESPIRATORY (INHALATION) at 21:55

## 2018-03-17 RX ADMIN — Medication 1 MILLIGRAM(S): at 10:52

## 2018-03-17 RX ADMIN — Medication 1 TABLET(S): at 10:53

## 2018-03-17 RX ADMIN — TIOTROPIUM BROMIDE 1 CAPSULE(S): 18 CAPSULE ORAL; RESPIRATORY (INHALATION) at 11:00

## 2018-03-17 RX ADMIN — CELECOXIB 100 MILLIGRAM(S): 200 CAPSULE ORAL at 06:29

## 2018-03-17 RX ADMIN — GABAPENTIN 100 MILLIGRAM(S): 400 CAPSULE ORAL at 10:52

## 2018-03-17 RX ADMIN — AMLODIPINE BESYLATE 5 MILLIGRAM(S): 2.5 TABLET ORAL at 06:28

## 2018-03-17 RX ADMIN — GABAPENTIN 100 MILLIGRAM(S): 400 CAPSULE ORAL at 16:55

## 2018-03-17 RX ADMIN — TRAMADOL HYDROCHLORIDE 50 MILLIGRAM(S): 50 TABLET ORAL at 17:16

## 2018-03-17 RX ADMIN — GABAPENTIN 300 MILLIGRAM(S): 400 CAPSULE ORAL at 21:56

## 2018-03-17 RX ADMIN — CELECOXIB 100 MILLIGRAM(S): 200 CAPSULE ORAL at 17:16

## 2018-03-17 RX ADMIN — Medication 100 MILLIGRAM(S): at 10:53

## 2018-03-17 RX ADMIN — QUETIAPINE FUMARATE 200 MILLIGRAM(S): 200 TABLET, FILM COATED ORAL at 21:56

## 2018-03-17 RX ADMIN — BUDESONIDE AND FORMOTEROL FUMARATE DIHYDRATE 2 PUFF(S): 160; 4.5 AEROSOL RESPIRATORY (INHALATION) at 11:00

## 2018-03-17 RX ADMIN — ATORVASTATIN CALCIUM 10 MILLIGRAM(S): 80 TABLET, FILM COATED ORAL at 21:56

## 2018-03-17 RX ADMIN — CELECOXIB 100 MILLIGRAM(S): 200 CAPSULE ORAL at 16:57

## 2018-03-17 RX ADMIN — TRAMADOL HYDROCHLORIDE 50 MILLIGRAM(S): 50 TABLET ORAL at 16:56

## 2018-03-17 RX ADMIN — Medication 25 MILLIGRAM(S): at 10:55

## 2018-03-18 RX ADMIN — AMLODIPINE BESYLATE 5 MILLIGRAM(S): 2.5 TABLET ORAL at 06:36

## 2018-03-18 RX ADMIN — GABAPENTIN 100 MILLIGRAM(S): 400 CAPSULE ORAL at 14:37

## 2018-03-18 RX ADMIN — CELECOXIB 100 MILLIGRAM(S): 200 CAPSULE ORAL at 06:36

## 2018-03-18 RX ADMIN — GABAPENTIN 300 MILLIGRAM(S): 400 CAPSULE ORAL at 22:04

## 2018-03-18 RX ADMIN — GABAPENTIN 100 MILLIGRAM(S): 400 CAPSULE ORAL at 11:00

## 2018-03-18 RX ADMIN — Medication 100 MILLIGRAM(S): at 10:58

## 2018-03-18 RX ADMIN — Medication 1 TABLET(S): at 10:58

## 2018-03-18 RX ADMIN — Medication 1 MILLIGRAM(S): at 10:58

## 2018-03-18 RX ADMIN — ATORVASTATIN CALCIUM 10 MILLIGRAM(S): 80 TABLET, FILM COATED ORAL at 22:04

## 2018-03-18 RX ADMIN — Medication 100 MILLIGRAM(S): at 22:04

## 2018-03-18 RX ADMIN — TRAMADOL HYDROCHLORIDE 50 MILLIGRAM(S): 50 TABLET ORAL at 18:05

## 2018-03-18 RX ADMIN — TIOTROPIUM BROMIDE 1 CAPSULE(S): 18 CAPSULE ORAL; RESPIRATORY (INHALATION) at 10:57

## 2018-03-18 RX ADMIN — MEGESTROL ACETATE 400 MILLIGRAM(S): 40 SUSPENSION ORAL at 14:38

## 2018-03-18 RX ADMIN — TRAMADOL HYDROCHLORIDE 50 MILLIGRAM(S): 50 TABLET ORAL at 06:36

## 2018-03-18 RX ADMIN — Medication 25 MILLIGRAM(S): at 10:59

## 2018-03-18 RX ADMIN — Medication 500 MILLIGRAM(S): at 10:58

## 2018-03-18 RX ADMIN — BUDESONIDE AND FORMOTEROL FUMARATE DIHYDRATE 2 PUFF(S): 160; 4.5 AEROSOL RESPIRATORY (INHALATION) at 10:56

## 2018-03-18 RX ADMIN — CELECOXIB 100 MILLIGRAM(S): 200 CAPSULE ORAL at 18:05

## 2018-03-18 RX ADMIN — BUDESONIDE AND FORMOTEROL FUMARATE DIHYDRATE 2 PUFF(S): 160; 4.5 AEROSOL RESPIRATORY (INHALATION) at 22:04

## 2018-03-18 RX ADMIN — QUETIAPINE FUMARATE 200 MILLIGRAM(S): 200 TABLET, FILM COATED ORAL at 22:03

## 2018-03-19 PROCEDURE — 99231 SBSQ HOSP IP/OBS SF/LOW 25: CPT

## 2018-03-19 RX ORDER — TRAMADOL HYDROCHLORIDE 50 MG/1
50 TABLET ORAL
Qty: 0 | Refills: 0 | Status: DISCONTINUED | OUTPATIENT
Start: 2018-03-19 | End: 2018-03-19

## 2018-03-19 RX ORDER — ACETAMINOPHEN 500 MG
975 TABLET ORAL EVERY 8 HOURS
Qty: 0 | Refills: 0 | Status: DISCONTINUED | OUTPATIENT
Start: 2018-03-19 | End: 2018-04-02

## 2018-03-19 RX ORDER — TRAMADOL HYDROCHLORIDE 50 MG/1
50 TABLET ORAL EVERY 8 HOURS
Qty: 0 | Refills: 0 | Status: DISCONTINUED | OUTPATIENT
Start: 2018-03-19 | End: 2018-03-23

## 2018-03-19 RX ADMIN — Medication 100 MILLIGRAM(S): at 11:41

## 2018-03-19 RX ADMIN — GABAPENTIN 100 MILLIGRAM(S): 400 CAPSULE ORAL at 15:03

## 2018-03-19 RX ADMIN — Medication 975 MILLIGRAM(S): at 23:13

## 2018-03-19 RX ADMIN — BUDESONIDE AND FORMOTEROL FUMARATE DIHYDRATE 2 PUFF(S): 160; 4.5 AEROSOL RESPIRATORY (INHALATION) at 22:29

## 2018-03-19 RX ADMIN — CELECOXIB 100 MILLIGRAM(S): 200 CAPSULE ORAL at 07:16

## 2018-03-19 RX ADMIN — CELECOXIB 100 MILLIGRAM(S): 200 CAPSULE ORAL at 18:15

## 2018-03-19 RX ADMIN — BUDESONIDE AND FORMOTEROL FUMARATE DIHYDRATE 2 PUFF(S): 160; 4.5 AEROSOL RESPIRATORY (INHALATION) at 11:42

## 2018-03-19 RX ADMIN — GABAPENTIN 300 MILLIGRAM(S): 400 CAPSULE ORAL at 22:28

## 2018-03-19 RX ADMIN — ATORVASTATIN CALCIUM 10 MILLIGRAM(S): 80 TABLET, FILM COATED ORAL at 22:28

## 2018-03-19 RX ADMIN — AMLODIPINE BESYLATE 5 MILLIGRAM(S): 2.5 TABLET ORAL at 07:16

## 2018-03-19 RX ADMIN — Medication 25 MILLIGRAM(S): at 11:41

## 2018-03-19 RX ADMIN — Medication 1 MILLIGRAM(S): at 11:41

## 2018-03-19 RX ADMIN — TIOTROPIUM BROMIDE 1 CAPSULE(S): 18 CAPSULE ORAL; RESPIRATORY (INHALATION) at 11:42

## 2018-03-19 RX ADMIN — Medication 500 MILLIGRAM(S): at 15:01

## 2018-03-19 RX ADMIN — Medication 100 MILLIGRAM(S): at 22:28

## 2018-03-19 RX ADMIN — TRAMADOL HYDROCHLORIDE 50 MILLIGRAM(S): 50 TABLET ORAL at 22:29

## 2018-03-19 RX ADMIN — TRAMADOL HYDROCHLORIDE 50 MILLIGRAM(S): 50 TABLET ORAL at 23:13

## 2018-03-19 RX ADMIN — TRAMADOL HYDROCHLORIDE 50 MILLIGRAM(S): 50 TABLET ORAL at 07:26

## 2018-03-19 RX ADMIN — Medication 1 TABLET(S): at 11:41

## 2018-03-19 RX ADMIN — GABAPENTIN 100 MILLIGRAM(S): 400 CAPSULE ORAL at 11:40

## 2018-03-19 RX ADMIN — QUETIAPINE FUMARATE 200 MILLIGRAM(S): 200 TABLET, FILM COATED ORAL at 22:28

## 2018-03-19 RX ADMIN — MEGESTROL ACETATE 400 MILLIGRAM(S): 40 SUSPENSION ORAL at 15:02

## 2018-03-19 RX ADMIN — Medication 975 MILLIGRAM(S): at 22:27

## 2018-03-19 NOTE — PROGRESS NOTE BEHAVIORAL HEALTH - MOOD
s/p I&D of sacral decubitus ulcer POD #2  Pt seen at bedside, no overnight events.    Vitals:  T(C): 36.7 (11 Oct 2017 05:05), Max: 36.9 (10 Oct 2017 20:30)  T(F): 98 (11 Oct 2017 05:05), Max: 98.4 (10 Oct 2017 20:30)  HR: 108 (11 Oct 2017 05:05) (99 - 109)  BP: 120/64 (11 Oct 2017 05:05) (117/83 - 120/64)  RR: 16 (11 Oct 2017 05:05) (16 - 18)  SpO2: 98% (11 Oct 2017 05:05) (98% - 100%)    Sacrum: dressing changed, + granulation tissue, no purulent drainage, no odor                          8.1    10.8  )-----------( 308      ( 11 Oct 2017 08:43 )             26.5 Other Depressed

## 2018-03-19 NOTE — PROGRESS NOTE BEHAVIORAL HEALTH - PROBLEM SELECTOR PLAN 1
-Continue Seroquel 200 mg qHS  -Continue Trazodone 100 mg qHS  -Continue megestrol 40 mg for appetite stimulation and multivitamin, Vitamin D, Vitamin C, thiamine, and folic acid for malnourishment  -CMP ordered for tomorrow AM

## 2018-03-19 NOTE — PROGRESS NOTE BEHAVIORAL HEALTH - NSBHCHARTREVIEWVS_PSY_A_CORE FT
Vital Signs Last 24 Hrs  T(C): 36.7 (19 Mar 2018 08:59), Max: 37.4 (19 Mar 2018 06:15)  T(F): 98 (19 Mar 2018 08:59), Max: 99.3 (19 Mar 2018 06:15)  HR: 97 (19 Mar 2018 08:59) (87 - 97)  BP: 117/77 (19 Mar 2018 08:59) (117/77 - 148/80)  BP(mean): --  RR: 20 (19 Mar 2018 08:59) (18 - 20)  SpO2: --

## 2018-03-19 NOTE — PROGRESS NOTE BEHAVIORAL HEALTH - RISK ASSESSMENT
Patient recently lost her mother and is socially isolated. On the unit, her PO intake has improved, but she still endorses significant depression.

## 2018-03-19 NOTE — PROGRESS NOTE BEHAVIORAL HEALTH - NSBHFUPINTERVALHXFT_PSY_A_CORE
Case was discussed with treatment team and chart was reviewed. Patient was compliant with all meds. As per nursing report, patient appears to have adjusted to the unit, acting more spontaneous and brighter. She continues to be preoccupied with increasing Tramadol and is complaining of knee and back pain. She currently endorses depressed mood, but appears improved compared to admission. Her appetite has improved, but she realizes that it takes a long time for her to finish a meal. She again reports early morning awakenings, but was able to fall back asleep. Denies any manic/hypomanic symptoms, paranoid thoughts/delusions, perceptual disturbances, SI/HI.    Medical problems: Right wrist drop appears to have improved.  strength 4/5 right, 5/5 left. Sensation diminished but grossly intact. Active ROM still limited in all planes.

## 2018-03-19 NOTE — PROGRESS NOTE BEHAVIORAL HEALTH - PROBLEM SELECTOR PLAN 3
-Continue tramadol 50 mg BID and celecoxib  -Continue Gabapentin 100 mg q9am, q1pm and 300 mg qHS  -Pain management consult requested. Recommendations appreciated. -Continue celecoxib  -Continue Gabapentin 100 mg q9am, q1pm and 300 mg qHS  -As per pain management consult: recommended to increase Tramadol to 50 mg PRN pain q8h and to start standing Tylenol 975 mg q8h

## 2018-03-19 NOTE — PROGRESS NOTE BEHAVIORAL HEALTH - SUMMARY
68 yo  female, domiciled alone with past psychiatric history of MDD and anxiety admitted for worsening depression and passive suicidal ideation through decreased PO intake. She continues to endorse depression, but has been observed to show some improvement, especially in regards to her PO intake. She is still somatically preoccupied and exhibiting medication-seeking behavior. Pain management consult placed in order to explore alternative pain regimen. Patient continues to warrant inpatient hospitalization for medication management, symptom stabilization, and safety.

## 2018-03-20 LAB
ALBUMIN SERPL ELPH-MCNC: 3.8 G/DL — SIGNIFICANT CHANGE UP (ref 3.3–5)
ALP SERPL-CCNC: 53 U/L — SIGNIFICANT CHANGE UP (ref 40–120)
ALT FLD-CCNC: 13 U/L — SIGNIFICANT CHANGE UP (ref 10–45)
ANION GAP SERPL CALC-SCNC: 10 MMOL/L — SIGNIFICANT CHANGE UP (ref 5–17)
AST SERPL-CCNC: 15 U/L — SIGNIFICANT CHANGE UP (ref 10–40)
BILIRUB SERPL-MCNC: 0.3 MG/DL — SIGNIFICANT CHANGE UP (ref 0.2–1.2)
BUN SERPL-MCNC: 6 MG/DL — LOW (ref 7–23)
CALCIUM SERPL-MCNC: 9.2 MG/DL — SIGNIFICANT CHANGE UP (ref 8.4–10.5)
CHLORIDE SERPL-SCNC: 104 MMOL/L — SIGNIFICANT CHANGE UP (ref 96–108)
CO2 SERPL-SCNC: 27 MMOL/L — SIGNIFICANT CHANGE UP (ref 22–31)
CREAT SERPL-MCNC: 0.6 MG/DL — SIGNIFICANT CHANGE UP (ref 0.5–1.3)
GLUCOSE SERPL-MCNC: 99 MG/DL — SIGNIFICANT CHANGE UP (ref 70–99)
POTASSIUM SERPL-MCNC: 3.9 MMOL/L — SIGNIFICANT CHANGE UP (ref 3.5–5.3)
POTASSIUM SERPL-SCNC: 3.9 MMOL/L — SIGNIFICANT CHANGE UP (ref 3.5–5.3)
PROT SERPL-MCNC: 6.5 G/DL — SIGNIFICANT CHANGE UP (ref 6–8.3)
SODIUM SERPL-SCNC: 141 MMOL/L — SIGNIFICANT CHANGE UP (ref 135–145)

## 2018-03-20 PROCEDURE — 99231 SBSQ HOSP IP/OBS SF/LOW 25: CPT

## 2018-03-20 RX ADMIN — Medication 500 MILLIGRAM(S): at 11:45

## 2018-03-20 RX ADMIN — Medication 25 MILLIGRAM(S): at 10:24

## 2018-03-20 RX ADMIN — QUETIAPINE FUMARATE 200 MILLIGRAM(S): 200 TABLET, FILM COATED ORAL at 22:14

## 2018-03-20 RX ADMIN — TIOTROPIUM BROMIDE 1 CAPSULE(S): 18 CAPSULE ORAL; RESPIRATORY (INHALATION) at 11:45

## 2018-03-20 RX ADMIN — Medication 1 TABLET(S): at 10:24

## 2018-03-20 RX ADMIN — Medication 975 MILLIGRAM(S): at 22:13

## 2018-03-20 RX ADMIN — BUDESONIDE AND FORMOTEROL FUMARATE DIHYDRATE 2 PUFF(S): 160; 4.5 AEROSOL RESPIRATORY (INHALATION) at 10:26

## 2018-03-20 RX ADMIN — GABAPENTIN 300 MILLIGRAM(S): 400 CAPSULE ORAL at 22:13

## 2018-03-20 RX ADMIN — TRAMADOL HYDROCHLORIDE 50 MILLIGRAM(S): 50 TABLET ORAL at 06:27

## 2018-03-20 RX ADMIN — CELECOXIB 100 MILLIGRAM(S): 200 CAPSULE ORAL at 06:27

## 2018-03-20 RX ADMIN — CELECOXIB 100 MILLIGRAM(S): 200 CAPSULE ORAL at 15:24

## 2018-03-20 RX ADMIN — MEGESTROL ACETATE 400 MILLIGRAM(S): 40 SUSPENSION ORAL at 10:25

## 2018-03-20 RX ADMIN — GABAPENTIN 100 MILLIGRAM(S): 400 CAPSULE ORAL at 10:24

## 2018-03-20 RX ADMIN — TRAMADOL HYDROCHLORIDE 50 MILLIGRAM(S): 50 TABLET ORAL at 22:13

## 2018-03-20 RX ADMIN — AMLODIPINE BESYLATE 5 MILLIGRAM(S): 2.5 TABLET ORAL at 06:27

## 2018-03-20 RX ADMIN — GABAPENTIN 100 MILLIGRAM(S): 400 CAPSULE ORAL at 15:24

## 2018-03-20 RX ADMIN — Medication 1 SPRAY(S): at 06:27

## 2018-03-20 RX ADMIN — CELECOXIB 100 MILLIGRAM(S): 200 CAPSULE ORAL at 15:26

## 2018-03-20 RX ADMIN — Medication 975 MILLIGRAM(S): at 15:24

## 2018-03-20 RX ADMIN — ATORVASTATIN CALCIUM 10 MILLIGRAM(S): 80 TABLET, FILM COATED ORAL at 22:14

## 2018-03-20 RX ADMIN — Medication 100 MILLIGRAM(S): at 22:20

## 2018-03-20 RX ADMIN — Medication 100 MILLIGRAM(S): at 10:25

## 2018-03-20 RX ADMIN — BUDESONIDE AND FORMOTEROL FUMARATE DIHYDRATE 2 PUFF(S): 160; 4.5 AEROSOL RESPIRATORY (INHALATION) at 22:17

## 2018-03-20 RX ADMIN — Medication 975 MILLIGRAM(S): at 15:26

## 2018-03-20 RX ADMIN — Medication 975 MILLIGRAM(S): at 03:15

## 2018-03-20 RX ADMIN — Medication 1 MILLIGRAM(S): at 10:24

## 2018-03-20 RX ADMIN — Medication 975 MILLIGRAM(S): at 06:27

## 2018-03-20 NOTE — PROGRESS NOTE BEHAVIORAL HEALTH - PROBLEM SELECTOR PLAN 3
-Continue celecoxib  -Continue Gabapentin 100 mg q9am, q1pm and 300 mg qHS  -As per pain management consult: recommended to increase Tramadol to 50 mg PRN pain q8h and to start standing Tylenol 975 mg q8h

## 2018-03-20 NOTE — PROGRESS NOTE BEHAVIORAL HEALTH - NSBHFUPINTERVALCCFT_PSY_A_CORE
sleep ok  appetite fair as per patient. .  wants treatment in the hospital.  mild pain complaints.  significant preoccupation with pain.  staff notes patient is in behavioral control.    Talks about wanting to stay in the hospital because of her appetite but observed eating well. avoids discussion; significant preoccupation with pain. avoids discussion; significant preoccupation with pain.  However not in distress; more obsessional .

## 2018-03-20 NOTE — PROGRESS NOTE BEHAVIORAL HEALTH - SUMMARY
68 yo  female, domiciled alone with past psychiatric history of MDD and anxiety admitted for worsening depression and passive suicidal ideation through decreased PO intake. She continues to endorse depression, but has been observed to show some improvement, especially in regards to her PO intake. She is still somatically preoccupied and exhibiting medication-seeking behavior. Pain management consult placed in order to explore alternative pain regimen. Patient continues to warrant inpatient hospitalization for medication management, symptom stabilization, and safety.   ;;3/20: seen by Pain Management and focused on Tramadol increased to q 8h prn .  While med seeking has dependent quality wanting to be in the hosptial for her appettie but eating well;  good ADLs;  no psychotic sxs. Summary (include case differential, formulation and patient response to therapy): 66 yo  male, , domiciled, with PPH of schizoaffective disorder, depression, and 2 prior hospitalizations, last discharged from [facility] 1 month ago presents with increasing agitation and suicidal gestures in the context of medication noncompliance. He is no longer endorsing passive suicidal ideation to this writer, but remains anxious, depressed, and somatically preoccupied. Will titrate Risperdal up. He continues to require inpatient hospitalization for safety, medication management, and stabilization.       ;;3/20: very difficult to assess; claims to have total body pain; but later up walking about ; recieves Tylenol which appears to help;  mostly avoidant and at times even appears confused.  continuing efforts with Risperdal raising to 1mg at 13h and at 21h for disorganization and attempting to provide support at home with expectation of limited progress given evidence for cognitive impairment.   Patient does make needs known and is directilbe and not aggressive.

## 2018-03-20 NOTE — PROGRESS NOTE BEHAVIORAL HEALTH - NSBHCHARTREVIEWVS_PSY_A_CORE FT
Vital Signs Last 24 Hrs  T(C): 37.1 (20 Mar 2018 10:00), Max: 37.1 (19 Mar 2018 16:52)  T(F): 98.8 (20 Mar 2018 10:00), Max: 98.8 (20 Mar 2018 10:00)  HR: 91 (20 Mar 2018 10:00) (86 - 91)  BP: 127/83 (20 Mar 2018 10:00) (127/83 - 134/83)  BP(mean): --  RR: 18 (20 Mar 2018 10:00) (16 - 18)  SpO2: --

## 2018-03-21 PROCEDURE — 99231 SBSQ HOSP IP/OBS SF/LOW 25: CPT

## 2018-03-21 RX ADMIN — Medication 975 MILLIGRAM(S): at 11:04

## 2018-03-21 RX ADMIN — CELECOXIB 100 MILLIGRAM(S): 200 CAPSULE ORAL at 07:44

## 2018-03-21 RX ADMIN — GABAPENTIN 100 MILLIGRAM(S): 400 CAPSULE ORAL at 10:20

## 2018-03-21 RX ADMIN — CELECOXIB 100 MILLIGRAM(S): 200 CAPSULE ORAL at 03:15

## 2018-03-21 RX ADMIN — TRAMADOL HYDROCHLORIDE 50 MILLIGRAM(S): 50 TABLET ORAL at 03:16

## 2018-03-21 RX ADMIN — Medication 1 SPRAY(S): at 18:04

## 2018-03-21 RX ADMIN — BUDESONIDE AND FORMOTEROL FUMARATE DIHYDRATE 2 PUFF(S): 160; 4.5 AEROSOL RESPIRATORY (INHALATION) at 22:18

## 2018-03-21 RX ADMIN — TRAMADOL HYDROCHLORIDE 50 MILLIGRAM(S): 50 TABLET ORAL at 07:03

## 2018-03-21 RX ADMIN — Medication 975 MILLIGRAM(S): at 07:02

## 2018-03-21 RX ADMIN — Medication 500 MILLIGRAM(S): at 10:17

## 2018-03-21 RX ADMIN — MEGESTROL ACETATE 400 MILLIGRAM(S): 40 SUSPENSION ORAL at 18:32

## 2018-03-21 RX ADMIN — AMLODIPINE BESYLATE 5 MILLIGRAM(S): 2.5 TABLET ORAL at 07:02

## 2018-03-21 RX ADMIN — BUDESONIDE AND FORMOTEROL FUMARATE DIHYDRATE 2 PUFF(S): 160; 4.5 AEROSOL RESPIRATORY (INHALATION) at 10:26

## 2018-03-21 RX ADMIN — TRAMADOL HYDROCHLORIDE 50 MILLIGRAM(S): 50 TABLET ORAL at 23:13

## 2018-03-21 RX ADMIN — Medication 1 TABLET(S): at 10:17

## 2018-03-21 RX ADMIN — Medication 975 MILLIGRAM(S): at 17:47

## 2018-03-21 RX ADMIN — Medication 975 MILLIGRAM(S): at 22:14

## 2018-03-21 RX ADMIN — TRAMADOL HYDROCHLORIDE 50 MILLIGRAM(S): 50 TABLET ORAL at 22:15

## 2018-03-21 RX ADMIN — GABAPENTIN 300 MILLIGRAM(S): 400 CAPSULE ORAL at 22:15

## 2018-03-21 RX ADMIN — GABAPENTIN 100 MILLIGRAM(S): 400 CAPSULE ORAL at 14:31

## 2018-03-21 RX ADMIN — Medication 1 MILLIGRAM(S): at 10:17

## 2018-03-21 RX ADMIN — CELECOXIB 100 MILLIGRAM(S): 200 CAPSULE ORAL at 03:16

## 2018-03-21 RX ADMIN — Medication 975 MILLIGRAM(S): at 23:30

## 2018-03-21 RX ADMIN — Medication 25 MILLIGRAM(S): at 10:17

## 2018-03-21 RX ADMIN — Medication 1 SPRAY(S): at 07:06

## 2018-03-21 RX ADMIN — TIOTROPIUM BROMIDE 1 CAPSULE(S): 18 CAPSULE ORAL; RESPIRATORY (INHALATION) at 18:04

## 2018-03-21 RX ADMIN — QUETIAPINE FUMARATE 200 MILLIGRAM(S): 200 TABLET, FILM COATED ORAL at 22:15

## 2018-03-21 RX ADMIN — ATORVASTATIN CALCIUM 10 MILLIGRAM(S): 80 TABLET, FILM COATED ORAL at 22:15

## 2018-03-21 RX ADMIN — CELECOXIB 100 MILLIGRAM(S): 200 CAPSULE ORAL at 18:04

## 2018-03-21 RX ADMIN — CELECOXIB 100 MILLIGRAM(S): 200 CAPSULE ORAL at 07:02

## 2018-03-21 RX ADMIN — Medication 100 MILLIGRAM(S): at 10:17

## 2018-03-21 RX ADMIN — Medication 975 MILLIGRAM(S): at 14:31

## 2018-03-21 RX ADMIN — Medication 100 MILLIGRAM(S): at 22:15

## 2018-03-21 RX ADMIN — Medication 975 MILLIGRAM(S): at 03:15

## 2018-03-21 RX ADMIN — TRAMADOL HYDROCHLORIDE 50 MILLIGRAM(S): 50 TABLET ORAL at 11:04

## 2018-03-21 NOTE — PROGRESS NOTE BEHAVIORAL HEALTH - NSBHCHARTREVIEWVS_PSY_A_CORE FT
Vital Signs Last 24 Hrs  T(C): 37.4 (21 Mar 2018 06:36), Max: 37.4 (21 Mar 2018 06:36)  T(F): 99.3 (21 Mar 2018 06:36), Max: 99.3 (21 Mar 2018 06:36)  HR: 99 (21 Mar 2018 06:36) (91 - 99)  BP: 153/64 (21 Mar 2018 06:36) (127/83 - 153/64)  BP(mean): --  RR: 18 (21 Mar 2018 06:36) (18 - 18)  SpO2: --

## 2018-03-21 NOTE — PROGRESS NOTE BEHAVIORAL HEALTH - NSBHFUPINTERVALHXFT_PSY_A_CORE
Pt very upset this am due to impending discharge. She states that she is concerned about not being able to care for self at home due to ongoing depression.  Endorses low mood and chronic pain and is very focused on going to another hospital if she is discharged today; also concerned about the storm and being unable to attend to her daily needs given the weather circumstances.  Preoccupied with pain this am and focused on pain management which she reports has been helpful.  No other concerns and pt agreeing to be discharged tomorrow with a plan to participate in community activities and follow up with outpatient providers.  Good effect of motivational interviewing this am - pt agrees to engage in planning of her future and take control over her life planning as opposed to being passive.

## 2018-03-21 NOTE — PROGRESS NOTE BEHAVIORAL HEALTH - SUMMARY
Summary (include case differential, formulation and patient response to therapy): 66 yo  male, , domiciled, with PPH of schizoaffective disorder, depression, and 2 prior hospitalizations, last discharged from [facility] 1 month ago presents with increasing agitation and suicidal gestures in the context of medication noncompliance. He is no longer endorsing passive suicidal ideation to this writer, but remains anxious, depressed, and somatically preoccupied. Will titrate Risperdal up. He continues to require inpatient hospitalization for safety, medication management, and stabilization.       ;;3/20: very difficult to assess; claims to have total body pain; but later up walking about ; recieves Tylenol which appears to help;  mostly avoidant and at times even appears confused.  continuing efforts with Risperdal raising to 1mg at 13h and at 21h for disorganization and attempting to provide support at home with expectation of limited progress given evidence for cognitive impairment.   Patient does make needs known and is directible and not aggressive.  3/21: anxious about discharge and labile; pain meds are still one of pt's major concerns; agreeing to be discharged tomorrow

## 2018-03-22 PROCEDURE — 99231 SBSQ HOSP IP/OBS SF/LOW 25: CPT

## 2018-03-22 RX ORDER — ASCORBIC ACID 60 MG
1 TABLET,CHEWABLE ORAL
Qty: 30 | Refills: 0
Start: 2018-03-22 | End: 2018-04-20

## 2018-03-22 RX ORDER — TRAZODONE HCL 50 MG
1 TABLET ORAL
Qty: 15 | Refills: 0 | OUTPATIENT
Start: 2018-03-22 | End: 2018-04-05

## 2018-03-22 RX ORDER — GABAPENTIN 400 MG/1
1 CAPSULE ORAL
Qty: 15 | Refills: 0 | OUTPATIENT
Start: 2018-03-22 | End: 2018-04-05

## 2018-03-22 RX ORDER — CELECOXIB 200 MG/1
1 CAPSULE ORAL
Qty: 60 | Refills: 0
Start: 2018-03-22 | End: 2018-04-20

## 2018-03-22 RX ORDER — QUETIAPINE FUMARATE 200 MG/1
1 TABLET, FILM COATED ORAL
Qty: 15 | Refills: 0 | OUTPATIENT
Start: 2018-03-22 | End: 2018-04-05

## 2018-03-22 RX ORDER — TIOTROPIUM BROMIDE 18 UG/1
1 CAPSULE ORAL; RESPIRATORY (INHALATION)
Qty: 30 | Refills: 0
Start: 2018-03-22 | End: 2018-04-20

## 2018-03-22 RX ORDER — TRAMADOL HYDROCHLORIDE 50 MG/1
1 TABLET ORAL
Qty: 21 | Refills: 0 | OUTPATIENT
Start: 2018-03-22 | End: 2018-03-28

## 2018-03-22 RX ORDER — AMLODIPINE BESYLATE 2.5 MG/1
1 TABLET ORAL
Qty: 30 | Refills: 0
Start: 2018-03-22 | End: 2018-04-20

## 2018-03-22 RX ORDER — ATORVASTATIN CALCIUM 80 MG/1
1 TABLET, FILM COATED ORAL
Qty: 30 | Refills: 0
Start: 2018-03-22 | End: 2018-04-20

## 2018-03-22 RX ORDER — METOPROLOL TARTRATE 50 MG
1 TABLET ORAL
Qty: 30 | Refills: 0
Start: 2018-03-22 | End: 2018-04-20

## 2018-03-22 RX ORDER — FLUTICASONE PROPIONATE 50 MCG
1 SPRAY, SUSPENSION NASAL
Qty: 1 | Refills: 0
Start: 2018-03-22 | End: 2018-04-20

## 2018-03-22 RX ORDER — MEGESTROL ACETATE 40 MG/ML
10 SUSPENSION ORAL
Qty: 300 | Refills: 0
Start: 2018-03-22 | End: 2018-04-20

## 2018-03-22 RX ORDER — GABAPENTIN 400 MG/1
1 CAPSULE ORAL
Qty: 30 | Refills: 0 | OUTPATIENT
Start: 2018-03-22 | End: 2018-04-05

## 2018-03-22 RX ORDER — FOLIC ACID 0.8 MG
1 TABLET ORAL
Qty: 30 | Refills: 0
Start: 2018-03-22 | End: 2018-04-20

## 2018-03-22 RX ORDER — FLUTICASONE PROPIONATE AND SALMETEROL 50; 250 UG/1; UG/1
1 POWDER ORAL; RESPIRATORY (INHALATION)
Qty: 1 | Refills: 0
Start: 2018-03-22 | End: 2018-04-20

## 2018-03-22 RX ORDER — LISINOPRIL 2.5 MG/1
1 TABLET ORAL
Qty: 30 | Refills: 0
Start: 2018-03-22 | End: 2018-04-20

## 2018-03-22 RX ORDER — THIAMINE MONONITRATE (VIT B1) 100 MG
1 TABLET ORAL
Qty: 30 | Refills: 0
Start: 2018-03-22 | End: 2018-04-20

## 2018-03-22 RX ORDER — ERGOCALCIFEROL 1.25 MG/1
1 CAPSULE ORAL
Qty: 4 | Refills: 0
Start: 2018-03-22 | End: 2018-04-20

## 2018-03-22 RX ORDER — ALBUTEROL 90 UG/1
2 AEROSOL, METERED ORAL
Qty: 1 | Refills: 0
Start: 2018-03-22 | End: 2018-04-20

## 2018-03-22 RX ORDER — CALCIUM CARBONATE 500(1250)
1 TABLET ORAL
Qty: 60 | Refills: 0 | OUTPATIENT
Start: 2018-03-22 | End: 2018-04-20

## 2018-03-22 RX ORDER — ACETAMINOPHEN 500 MG
3 TABLET ORAL
Qty: 60 | Refills: 0 | OUTPATIENT
Start: 2018-03-22 | End: 2018-03-28

## 2018-03-22 RX ORDER — LORATADINE 10 MG/1
1 TABLET ORAL
Qty: 30 | Refills: 0
Start: 2018-03-22 | End: 2018-04-20

## 2018-03-22 RX ORDER — CALCIUM CARBONATE 500(1250)
2 TABLET ORAL
Qty: 60 | Refills: 0
Start: 2018-03-22 | End: 2018-04-20

## 2018-03-22 RX ADMIN — GABAPENTIN 100 MILLIGRAM(S): 400 CAPSULE ORAL at 10:10

## 2018-03-22 RX ADMIN — AMLODIPINE BESYLATE 5 MILLIGRAM(S): 2.5 TABLET ORAL at 06:31

## 2018-03-22 RX ADMIN — BUDESONIDE AND FORMOTEROL FUMARATE DIHYDRATE 2 PUFF(S): 160; 4.5 AEROSOL RESPIRATORY (INHALATION) at 22:22

## 2018-03-22 RX ADMIN — CELECOXIB 100 MILLIGRAM(S): 200 CAPSULE ORAL at 06:31

## 2018-03-22 RX ADMIN — Medication 25 MILLIGRAM(S): at 10:10

## 2018-03-22 RX ADMIN — BUDESONIDE AND FORMOTEROL FUMARATE DIHYDRATE 2 PUFF(S): 160; 4.5 AEROSOL RESPIRATORY (INHALATION) at 10:10

## 2018-03-22 RX ADMIN — Medication 975 MILLIGRAM(S): at 14:11

## 2018-03-22 RX ADMIN — Medication 500 MILLIGRAM(S): at 10:10

## 2018-03-22 RX ADMIN — Medication 1 TABLET(S): at 10:10

## 2018-03-22 RX ADMIN — GABAPENTIN 300 MILLIGRAM(S): 400 CAPSULE ORAL at 22:19

## 2018-03-22 RX ADMIN — MEGESTROL ACETATE 400 MILLIGRAM(S): 40 SUSPENSION ORAL at 13:10

## 2018-03-22 RX ADMIN — TRAMADOL HYDROCHLORIDE 50 MILLIGRAM(S): 50 TABLET ORAL at 14:11

## 2018-03-22 RX ADMIN — Medication 100 MILLIGRAM(S): at 22:18

## 2018-03-22 RX ADMIN — Medication 1 SPRAY(S): at 18:32

## 2018-03-22 RX ADMIN — QUETIAPINE FUMARATE 200 MILLIGRAM(S): 200 TABLET, FILM COATED ORAL at 22:19

## 2018-03-22 RX ADMIN — TIOTROPIUM BROMIDE 1 CAPSULE(S): 18 CAPSULE ORAL; RESPIRATORY (INHALATION) at 10:10

## 2018-03-22 RX ADMIN — Medication 975 MILLIGRAM(S): at 22:18

## 2018-03-22 RX ADMIN — Medication 1 MILLIGRAM(S): at 10:10

## 2018-03-22 RX ADMIN — TRAMADOL HYDROCHLORIDE 50 MILLIGRAM(S): 50 TABLET ORAL at 13:11

## 2018-03-22 RX ADMIN — ATORVASTATIN CALCIUM 10 MILLIGRAM(S): 80 TABLET, FILM COATED ORAL at 22:19

## 2018-03-22 RX ADMIN — GABAPENTIN 100 MILLIGRAM(S): 400 CAPSULE ORAL at 13:11

## 2018-03-22 RX ADMIN — Medication 975 MILLIGRAM(S): at 15:00

## 2018-03-22 RX ADMIN — CELECOXIB 100 MILLIGRAM(S): 200 CAPSULE ORAL at 17:31

## 2018-03-22 RX ADMIN — CELECOXIB 100 MILLIGRAM(S): 200 CAPSULE ORAL at 18:32

## 2018-03-22 RX ADMIN — Medication 100 MILLIGRAM(S): at 10:10

## 2018-03-22 RX ADMIN — Medication 1 SPRAY(S): at 06:33

## 2018-03-22 RX ADMIN — Medication 975 MILLIGRAM(S): at 06:31

## 2018-03-22 NOTE — PROGRESS NOTE BEHAVIORAL HEALTH - NSBHFUPINTERVALHXFT_PSY_A_CORE
Case was discussed with treatment team and chart was reviewed. Patient was compliant with all meds. As per nursing report, patient appears less anxious and is more future-oriented. She states that her pain is well-controlled on her current regimen. She reports good mood, and no problems with sleep. Her appetite has significantly improved, and her PO intake has been observed to be adequate on the unit. Denies any manic/hypomanic symptoms, paranoid thoughts/delusions, perceptual disturbances, SI/HI.    Medical problems: Right wrist drop appears to have improved compared to admission.  strength 4/5 right, 5/5 left. Sensation still diminished but grossly intact. Skin warm, dry, well-perfused. Active ROM still limited in all planes. Patient counseled to follow up with neurology as an outpatient. Case was discussed with treatment team and chart was reviewed. Patient was compliant with all meds. As per nursing report, patient appears less anxious and is more future-oriented. She states that her pain is well-controlled on her current regimen. She reports good mood, and no problems with sleep. Her appetite has significantly improved, and her PO intake has been observed to be adequate on the unit. Denies any manic/hypomanic symptoms, paranoid thoughts/delusions, perceptual disturbances, SI/HI.    Medical problems: Right wrist drop appears to have improved compared to admission.  strength 4/5 right, 5/5 left. Sensation still diminished but grossly intact. Skin warm, dry, well-perfused. Active ROM still limited in all planes. Patient counseled to follow up with her primary care doctor (appointment scheduled on 3/29/18).

## 2018-03-22 NOTE — PROGRESS NOTE BEHAVIORAL HEALTH - PROBLEM SELECTOR PLAN 1
-For discharge today. All medications sent to preferred pharmacy (7 day supply for controlled substances, 15 days for psychotropic meds, 30 day supply for all others)  -Continue Seroquel 200 mg qHS  -Continue Trazodone 100 mg qHS  -Continue megestrol 40 mg for appetite stimulation and multivitamin, Vitamin D, Vitamin C, thiamine, and folic acid for malnourishment -For discharge today. All medications sent to preferred pharmacy (7 day supply for controlled substances, 15 days for psychotropic meds, 30 day supply for all others)  -Counseled to follow up with primary care doctor for right wrist drop. Appointment on 3/29/18  -Continue Seroquel 200 mg qHS  -Continue Trazodone 100 mg qHS  -Continue megestrol 40 mg for appetite stimulation and multivitamin, Vitamin D, Vitamin C, thiamine, and folic acid for malnourishment

## 2018-03-22 NOTE — PROGRESS NOTE BEHAVIORAL HEALTH - SUMMARY
66 yo  female, domiciled alone with past psychiatric history of MDD and anxiety admitted for worsening depression and passive suicidal ideation through decreased PO intake. She is significantly improved and no longer endorsing suicidal ideation. Her appetite has also improved and her PO intake has been adequate on the unit. She is stable for discharge today.

## 2018-03-22 NOTE — PROGRESS NOTE BEHAVIORAL HEALTH - NSBHCHARTREVIEWVS_PSY_A_CORE FT
Vital Signs Last 24 Hrs  T(C): 37 (22 Mar 2018 10:00), Max: 37.3 (22 Mar 2018 06:14)  T(F): 98.6 (22 Mar 2018 10:00), Max: 99.2 (22 Mar 2018 06:14)  HR: 100 (22 Mar 2018 10:00) (92 - 101)  BP: 131/67 (22 Mar 2018 10:00) (130/81 - 134/76)  BP(mean): --  RR: 20 (22 Mar 2018 10:00) (18 - 20)  SpO2: --

## 2018-03-23 PROCEDURE — 99231 SBSQ HOSP IP/OBS SF/LOW 25: CPT

## 2018-03-23 RX ORDER — GABAPENTIN 400 MG/1
200 CAPSULE ORAL
Qty: 0 | Refills: 0 | Status: DISCONTINUED | OUTPATIENT
Start: 2018-03-23 | End: 2018-04-02

## 2018-03-23 RX ORDER — TRAMADOL HYDROCHLORIDE 50 MG/1
50 TABLET ORAL DAILY
Qty: 0 | Refills: 0 | Status: DISCONTINUED | OUTPATIENT
Start: 2018-03-23 | End: 2018-03-30

## 2018-03-23 RX ORDER — QUETIAPINE FUMARATE 200 MG/1
250 TABLET, FILM COATED ORAL AT BEDTIME
Qty: 0 | Refills: 0 | Status: DISCONTINUED | OUTPATIENT
Start: 2018-03-23 | End: 2018-04-02

## 2018-03-23 RX ORDER — DULOXETINE HYDROCHLORIDE 30 MG/1
30 CAPSULE, DELAYED RELEASE ORAL ONCE
Qty: 0 | Refills: 0 | Status: COMPLETED | OUTPATIENT
Start: 2018-03-23 | End: 2018-03-23

## 2018-03-23 RX ORDER — GABAPENTIN 400 MG/1
400 CAPSULE ORAL AT BEDTIME
Qty: 0 | Refills: 0 | Status: DISCONTINUED | OUTPATIENT
Start: 2018-03-23 | End: 2018-04-02

## 2018-03-23 RX ADMIN — Medication 500 MILLIGRAM(S): at 11:02

## 2018-03-23 RX ADMIN — Medication 975 MILLIGRAM(S): at 15:03

## 2018-03-23 RX ADMIN — GABAPENTIN 200 MILLIGRAM(S): 400 CAPSULE ORAL at 15:03

## 2018-03-23 RX ADMIN — CELECOXIB 100 MILLIGRAM(S): 200 CAPSULE ORAL at 15:03

## 2018-03-23 RX ADMIN — Medication 100 MILLIGRAM(S): at 22:00

## 2018-03-23 RX ADMIN — AMLODIPINE BESYLATE 5 MILLIGRAM(S): 2.5 TABLET ORAL at 06:47

## 2018-03-23 RX ADMIN — ATORVASTATIN CALCIUM 10 MILLIGRAM(S): 80 TABLET, FILM COATED ORAL at 22:00

## 2018-03-23 RX ADMIN — Medication 1 MILLIGRAM(S): at 11:02

## 2018-03-23 RX ADMIN — Medication 25 MILLIGRAM(S): at 11:02

## 2018-03-23 RX ADMIN — GABAPENTIN 100 MILLIGRAM(S): 400 CAPSULE ORAL at 11:02

## 2018-03-23 RX ADMIN — CELECOXIB 100 MILLIGRAM(S): 200 CAPSULE ORAL at 06:47

## 2018-03-23 RX ADMIN — BUDESONIDE AND FORMOTEROL FUMARATE DIHYDRATE 2 PUFF(S): 160; 4.5 AEROSOL RESPIRATORY (INHALATION) at 11:01

## 2018-03-23 RX ADMIN — CELECOXIB 100 MILLIGRAM(S): 200 CAPSULE ORAL at 15:30

## 2018-03-23 RX ADMIN — ERGOCALCIFEROL 50000 UNIT(S): 1.25 CAPSULE ORAL at 11:02

## 2018-03-23 RX ADMIN — MEGESTROL ACETATE 400 MILLIGRAM(S): 40 SUSPENSION ORAL at 15:04

## 2018-03-23 RX ADMIN — Medication 975 MILLIGRAM(S): at 22:01

## 2018-03-23 RX ADMIN — TIOTROPIUM BROMIDE 1 CAPSULE(S): 18 CAPSULE ORAL; RESPIRATORY (INHALATION) at 15:16

## 2018-03-23 RX ADMIN — Medication 975 MILLIGRAM(S): at 06:47

## 2018-03-23 RX ADMIN — GABAPENTIN 400 MILLIGRAM(S): 400 CAPSULE ORAL at 22:00

## 2018-03-23 RX ADMIN — Medication 1 SPRAY(S): at 06:48

## 2018-03-23 RX ADMIN — Medication 1 TABLET(S): at 11:02

## 2018-03-23 RX ADMIN — TRAMADOL HYDROCHLORIDE 50 MILLIGRAM(S): 50 TABLET ORAL at 18:16

## 2018-03-23 RX ADMIN — Medication 100 MILLIGRAM(S): at 11:02

## 2018-03-23 RX ADMIN — DULOXETINE HYDROCHLORIDE 30 MILLIGRAM(S): 30 CAPSULE, DELAYED RELEASE ORAL at 15:03

## 2018-03-23 RX ADMIN — QUETIAPINE FUMARATE 250 MILLIGRAM(S): 200 TABLET, FILM COATED ORAL at 22:00

## 2018-03-23 RX ADMIN — Medication 975 MILLIGRAM(S): at 15:30

## 2018-03-23 NOTE — PROGRESS NOTE BEHAVIORAL HEALTH - OTHER
thin R wrist drop (new as of two weeks ago) good R wrist drop, now improved compared to admission superficially cooperative

## 2018-03-23 NOTE — PROGRESS NOTE BEHAVIORAL HEALTH - SUMMARY
66 yo  female, domiciled alone with past psychiatric history of MDD and anxiety admitted for worsening depression and passive suicidal ideation through decreased PO intake. She reports depressed mood, but denies current suicidal ideation and is able to make her needs known. Will readjust her medications in order to address both pain and mood symptoms. Patient continues to warrant inpatient hospitalization for medication management, symptom stabilization, and safety.

## 2018-03-23 NOTE — PROGRESS NOTE BEHAVIORAL HEALTH - NSBHCHARTREVIEWVS_PSY_A_CORE FT
Vital Signs Last 24 Hrs  T(C): 37.2 (23 Mar 2018 06:26), Max: 37.2 (22 Mar 2018 17:00)  T(F): 98.9 (23 Mar 2018 06:26), Max: 99 (22 Mar 2018 17:00)  HR: 91 (23 Mar 2018 06:26) (91 - 101)  BP: 126/75 (23 Mar 2018 06:26) (126/75 - 133/85)  BP(mean): --  RR: 18 (23 Mar 2018 06:26) (18 - 20)  SpO2: -- Vital Signs Last 24 Hrs  T(C): 37.1 (23 Mar 2018 17:00), Max: 37.2 (23 Mar 2018 06:26)  T(F): 98.8 (23 Mar 2018 17:00), Max: 98.9 (23 Mar 2018 06:26)  HR: 99 (23 Mar 2018 17:00) (91 - 99)  BP: 137/82 (23 Mar 2018 17:00) (126/75 - 137/82)  BP(mean): --  RR: 17 (23 Mar 2018 17:00) (17 - 18)  SpO2: --

## 2018-03-23 NOTE — PROGRESS NOTE BEHAVIORAL HEALTH - NSBHFUPINTERVALHXFT_PSY_A_CORE
Case was discussed with treatment team and chart was reviewed. Patient was compliant with all meds. Patient was ready for discharge yesterday, but then expressed suicidal ideation to the treatment team, so it was decided to continue inpatient hospitalization and readjust her medications as necessary. As per nursing report, patient is superficially pleasant but irritable. She was amenable to decreasing Tramadol to 50 mg once daily PRN severe pain. She reports depressed mood and okay sleep. Her appetite has significantly improved, and her PO intake remains adequate. Denies any manic/hypomanic symptoms, paranoid thoughts/delusions, perceptual disturbances, SI/HI.

## 2018-03-23 NOTE — PROGRESS NOTE BEHAVIORAL HEALTH - DETAILS
wrist drop R hand (pt reports onset 2 weeks ago, negative stroke work up). wrist drop R hand (pt reports onset 2 weeks prior to admission, negative stroke work up).

## 2018-03-23 NOTE — PROGRESS NOTE BEHAVIORAL HEALTH - PROBLEM SELECTOR PLAN 3
-Changed Tramadol to 50 mg once daily PRN severe pain and patient counseled not to ask for it at bedtime to avoid concomitant dosing with Seroquel  -Increased Gabapentin to 200 mg q9am, q1pm and 400 mg qHS  -Continue celecoxib  -Continue Tylenol 975 mg q8h

## 2018-03-23 NOTE — PROGRESS NOTE BEHAVIORAL HEALTH - PROBLEM SELECTOR PLAN 1
-Started Cymbalta 30 mg with plan to increase to 60 mg over the weekend  -Increased Seroquel to 250 mg qHS. Monitor for sedation  -Continue Trazodone 100 mg qHS  -Continue megestrol 40 mg for appetite stimulation and multivitamin, Vitamin D, Vitamin C, thiamine, and folic acid for malnourishment

## 2018-03-24 RX ADMIN — Medication 100 MILLIGRAM(S): at 21:43

## 2018-03-24 RX ADMIN — Medication 25 MILLIGRAM(S): at 10:45

## 2018-03-24 RX ADMIN — Medication 1 SPRAY(S): at 06:42

## 2018-03-24 RX ADMIN — AMLODIPINE BESYLATE 5 MILLIGRAM(S): 2.5 TABLET ORAL at 06:42

## 2018-03-24 RX ADMIN — GABAPENTIN 200 MILLIGRAM(S): 400 CAPSULE ORAL at 15:47

## 2018-03-24 RX ADMIN — CELECOXIB 100 MILLIGRAM(S): 200 CAPSULE ORAL at 15:47

## 2018-03-24 RX ADMIN — Medication 975 MILLIGRAM(S): at 22:40

## 2018-03-24 RX ADMIN — Medication 1 TABLET(S): at 10:45

## 2018-03-24 RX ADMIN — QUETIAPINE FUMARATE 250 MILLIGRAM(S): 200 TABLET, FILM COATED ORAL at 21:43

## 2018-03-24 RX ADMIN — Medication 500 MILLIGRAM(S): at 10:46

## 2018-03-24 RX ADMIN — Medication 975 MILLIGRAM(S): at 06:56

## 2018-03-24 RX ADMIN — Medication 1 MILLIGRAM(S): at 10:45

## 2018-03-24 RX ADMIN — GABAPENTIN 400 MILLIGRAM(S): 400 CAPSULE ORAL at 21:43

## 2018-03-24 RX ADMIN — Medication 100 MILLIGRAM(S): at 10:46

## 2018-03-24 RX ADMIN — CELECOXIB 100 MILLIGRAM(S): 200 CAPSULE ORAL at 16:37

## 2018-03-24 RX ADMIN — BUDESONIDE AND FORMOTEROL FUMARATE DIHYDRATE 2 PUFF(S): 160; 4.5 AEROSOL RESPIRATORY (INHALATION) at 10:46

## 2018-03-24 RX ADMIN — GABAPENTIN 200 MILLIGRAM(S): 400 CAPSULE ORAL at 10:45

## 2018-03-24 RX ADMIN — Medication 975 MILLIGRAM(S): at 16:37

## 2018-03-24 RX ADMIN — CELECOXIB 100 MILLIGRAM(S): 200 CAPSULE ORAL at 06:57

## 2018-03-24 RX ADMIN — Medication 975 MILLIGRAM(S): at 15:47

## 2018-03-24 RX ADMIN — BUDESONIDE AND FORMOTEROL FUMARATE DIHYDRATE 2 PUFF(S): 160; 4.5 AEROSOL RESPIRATORY (INHALATION) at 21:43

## 2018-03-24 RX ADMIN — Medication 975 MILLIGRAM(S): at 03:59

## 2018-03-24 RX ADMIN — ATORVASTATIN CALCIUM 10 MILLIGRAM(S): 80 TABLET, FILM COATED ORAL at 21:43

## 2018-03-24 RX ADMIN — Medication 975 MILLIGRAM(S): at 06:42

## 2018-03-24 RX ADMIN — CELECOXIB 100 MILLIGRAM(S): 200 CAPSULE ORAL at 06:42

## 2018-03-24 RX ADMIN — Medication 975 MILLIGRAM(S): at 21:42

## 2018-03-25 RX ADMIN — Medication 500 MILLIGRAM(S): at 09:56

## 2018-03-25 RX ADMIN — GABAPENTIN 200 MILLIGRAM(S): 400 CAPSULE ORAL at 09:56

## 2018-03-25 RX ADMIN — Medication 975 MILLIGRAM(S): at 07:00

## 2018-03-25 RX ADMIN — Medication 100 MILLIGRAM(S): at 09:56

## 2018-03-25 RX ADMIN — ATORVASTATIN CALCIUM 10 MILLIGRAM(S): 80 TABLET, FILM COATED ORAL at 23:09

## 2018-03-25 RX ADMIN — CELECOXIB 100 MILLIGRAM(S): 200 CAPSULE ORAL at 06:34

## 2018-03-25 RX ADMIN — GABAPENTIN 200 MILLIGRAM(S): 400 CAPSULE ORAL at 14:06

## 2018-03-25 RX ADMIN — AMLODIPINE BESYLATE 5 MILLIGRAM(S): 2.5 TABLET ORAL at 06:34

## 2018-03-25 RX ADMIN — Medication 975 MILLIGRAM(S): at 22:12

## 2018-03-25 RX ADMIN — MEGESTROL ACETATE 400 MILLIGRAM(S): 40 SUSPENSION ORAL at 09:58

## 2018-03-25 RX ADMIN — GABAPENTIN 400 MILLIGRAM(S): 400 CAPSULE ORAL at 23:09

## 2018-03-25 RX ADMIN — Medication 975 MILLIGRAM(S): at 22:27

## 2018-03-25 RX ADMIN — Medication 1 SPRAY(S): at 06:36

## 2018-03-25 RX ADMIN — BUDESONIDE AND FORMOTEROL FUMARATE DIHYDRATE 2 PUFF(S): 160; 4.5 AEROSOL RESPIRATORY (INHALATION) at 23:10

## 2018-03-25 RX ADMIN — Medication 1 MILLIGRAM(S): at 09:56

## 2018-03-25 RX ADMIN — Medication 975 MILLIGRAM(S): at 14:06

## 2018-03-25 RX ADMIN — CELECOXIB 100 MILLIGRAM(S): 200 CAPSULE ORAL at 15:06

## 2018-03-25 RX ADMIN — Medication 1 TABLET(S): at 09:56

## 2018-03-25 RX ADMIN — QUETIAPINE FUMARATE 250 MILLIGRAM(S): 200 TABLET, FILM COATED ORAL at 22:14

## 2018-03-25 RX ADMIN — Medication 25 MILLIGRAM(S): at 09:56

## 2018-03-25 RX ADMIN — ALBUTEROL 2 PUFF(S): 90 AEROSOL, METERED ORAL at 18:20

## 2018-03-25 RX ADMIN — CELECOXIB 100 MILLIGRAM(S): 200 CAPSULE ORAL at 14:06

## 2018-03-25 RX ADMIN — Medication 975 MILLIGRAM(S): at 06:35

## 2018-03-25 RX ADMIN — Medication 975 MILLIGRAM(S): at 15:06

## 2018-03-25 RX ADMIN — BUDESONIDE AND FORMOTEROL FUMARATE DIHYDRATE 2 PUFF(S): 160; 4.5 AEROSOL RESPIRATORY (INHALATION) at 09:57

## 2018-03-25 RX ADMIN — Medication 100 MILLIGRAM(S): at 23:09

## 2018-03-25 RX ADMIN — TRAMADOL HYDROCHLORIDE 50 MILLIGRAM(S): 50 TABLET ORAL at 06:36

## 2018-03-26 PROCEDURE — 99231 SBSQ HOSP IP/OBS SF/LOW 25: CPT

## 2018-03-26 RX ORDER — DULOXETINE HYDROCHLORIDE 30 MG/1
60 CAPSULE, DELAYED RELEASE ORAL DAILY
Qty: 0 | Refills: 0 | Status: DISCONTINUED | OUTPATIENT
Start: 2018-03-26 | End: 2018-04-02

## 2018-03-26 RX ADMIN — TIOTROPIUM BROMIDE 1 CAPSULE(S): 18 CAPSULE ORAL; RESPIRATORY (INHALATION) at 13:41

## 2018-03-26 RX ADMIN — Medication 975 MILLIGRAM(S): at 07:08

## 2018-03-26 RX ADMIN — Medication 1 TABLET(S): at 10:17

## 2018-03-26 RX ADMIN — Medication 975 MILLIGRAM(S): at 17:10

## 2018-03-26 RX ADMIN — DULOXETINE HYDROCHLORIDE 60 MILLIGRAM(S): 30 CAPSULE, DELAYED RELEASE ORAL at 13:41

## 2018-03-26 RX ADMIN — Medication 975 MILLIGRAM(S): at 13:41

## 2018-03-26 RX ADMIN — ALBUTEROL 2 PUFF(S): 90 AEROSOL, METERED ORAL at 18:11

## 2018-03-26 RX ADMIN — GABAPENTIN 200 MILLIGRAM(S): 400 CAPSULE ORAL at 13:41

## 2018-03-26 RX ADMIN — ATORVASTATIN CALCIUM 10 MILLIGRAM(S): 80 TABLET, FILM COATED ORAL at 22:12

## 2018-03-26 RX ADMIN — QUETIAPINE FUMARATE 250 MILLIGRAM(S): 200 TABLET, FILM COATED ORAL at 22:12

## 2018-03-26 RX ADMIN — Medication 975 MILLIGRAM(S): at 22:16

## 2018-03-26 RX ADMIN — AMLODIPINE BESYLATE 5 MILLIGRAM(S): 2.5 TABLET ORAL at 07:08

## 2018-03-26 RX ADMIN — BUDESONIDE AND FORMOTEROL FUMARATE DIHYDRATE 2 PUFF(S): 160; 4.5 AEROSOL RESPIRATORY (INHALATION) at 10:17

## 2018-03-26 RX ADMIN — CELECOXIB 100 MILLIGRAM(S): 200 CAPSULE ORAL at 18:11

## 2018-03-26 RX ADMIN — Medication 1 MILLIGRAM(S): at 10:16

## 2018-03-26 RX ADMIN — Medication 975 MILLIGRAM(S): at 10:14

## 2018-03-26 RX ADMIN — BUDESONIDE AND FORMOTEROL FUMARATE DIHYDRATE 2 PUFF(S): 160; 4.5 AEROSOL RESPIRATORY (INHALATION) at 22:11

## 2018-03-26 RX ADMIN — Medication 100 MILLIGRAM(S): at 22:12

## 2018-03-26 RX ADMIN — CELECOXIB 100 MILLIGRAM(S): 200 CAPSULE ORAL at 07:11

## 2018-03-26 RX ADMIN — Medication 25 MILLIGRAM(S): at 10:17

## 2018-03-26 RX ADMIN — MEGESTROL ACETATE 400 MILLIGRAM(S): 40 SUSPENSION ORAL at 13:40

## 2018-03-26 RX ADMIN — TRAMADOL HYDROCHLORIDE 50 MILLIGRAM(S): 50 TABLET ORAL at 07:08

## 2018-03-26 RX ADMIN — CELECOXIB 100 MILLIGRAM(S): 200 CAPSULE ORAL at 07:08

## 2018-03-26 RX ADMIN — Medication 1 SPRAY(S): at 18:10

## 2018-03-26 RX ADMIN — Medication 975 MILLIGRAM(S): at 22:12

## 2018-03-26 RX ADMIN — Medication 500 MILLIGRAM(S): at 10:16

## 2018-03-26 RX ADMIN — TRAMADOL HYDROCHLORIDE 50 MILLIGRAM(S): 50 TABLET ORAL at 13:12

## 2018-03-26 RX ADMIN — GABAPENTIN 200 MILLIGRAM(S): 400 CAPSULE ORAL at 10:17

## 2018-03-26 RX ADMIN — Medication 100 MILLIGRAM(S): at 10:17

## 2018-03-26 RX ADMIN — GABAPENTIN 400 MILLIGRAM(S): 400 CAPSULE ORAL at 22:12

## 2018-03-26 NOTE — PROGRESS NOTE BEHAVIORAL HEALTH - PROBLEM SELECTOR PLAN 1
-Increased Cymbalta   -Continue Seroquel 250 mg qHS. Monitor for sedation  -Continue Trazodone 100 mg qHS  -Continue megestrol 40 mg for appetite stimulation and multivitamin, Vitamin D, Vitamin C, thiamine, and folic acid for malnourishment -Increased Cymbalta to 60 mg daily  -Continue Seroquel 250 mg qHS. Monitor for sedation  -Continue Trazodone 100 mg qHS  -Continue megestrol 40 mg for appetite stimulation and multivitamin, Vitamin D, Vitamin C, thiamine, and folic acid for malnourishment

## 2018-03-26 NOTE — PROGRESS NOTE BEHAVIORAL HEALTH - PROBLEM SELECTOR PLAN 3
-Continue Tramadol 50 mg once daily PRN severe pain and patient counseled not to ask for it at bedtime to avoid concomitant dosing with Seroquel  -Continue Gabapentin 200 mg q9am, q1pm and 400 mg qHS  -Continue celecoxib  -Continue Tylenol 975 mg q8h

## 2018-03-26 NOTE — PROGRESS NOTE BEHAVIORAL HEALTH - SUMMARY
68 yo  female, domiciled alone with past psychiatric history of MDD and anxiety admitted for worsening depression and passive suicidal ideation through decreased PO intake. She continues to report depressed mood. Will titrate Cymbalta to address both mood and pain symptoms. Patient continues to warrant inpatient hospitalization for medication management, symptom stabilization, and safety.

## 2018-03-26 NOTE — PROGRESS NOTE BEHAVIORAL HEALTH - NSBHFUPINTERVALHXFT_PSY_A_CORE
Case was discussed with treatment team and chart was reviewed. Patient was compliant with all meds. As per nursing report, her mood was stable over the weekend and she was social with select peers. She currently reports anxious mood, but she was able to sleep throughout the night. Her appetite is improving, and her PO intake remains adequate. Nutrition spoke with patient today. Denies any manic/hypomanic symptoms, paranoid thoughts/delusions, perceptual disturbances, SI/HI.    Medical problems: Active ROM of right wrist has improved and patient was provided with modified utensils and pen by PT/OT.

## 2018-03-26 NOTE — PROGRESS NOTE BEHAVIORAL HEALTH - NSBHCHARTREVIEWVS_PSY_A_CORE FT
Vital Signs Last 24 Hrs  T(C): 36.9 (26 Mar 2018 09:04), Max: 37.6 (26 Mar 2018 06:00)  T(F): 98.5 (26 Mar 2018 09:04), Max: 99.7 (26 Mar 2018 06:00)  HR: 96 (26 Mar 2018 09:04) (96 - 105)  BP: 138/90 (26 Mar 2018 09:04) (130/82 - 138/90)  BP(mean): --  RR: 20 (26 Mar 2018 09:04) (18 - 20)  SpO2: --

## 2018-03-27 PROCEDURE — 99231 SBSQ HOSP IP/OBS SF/LOW 25: CPT

## 2018-03-27 RX ADMIN — ATORVASTATIN CALCIUM 10 MILLIGRAM(S): 80 TABLET, FILM COATED ORAL at 22:06

## 2018-03-27 RX ADMIN — QUETIAPINE FUMARATE 250 MILLIGRAM(S): 200 TABLET, FILM COATED ORAL at 22:07

## 2018-03-27 RX ADMIN — CELECOXIB 100 MILLIGRAM(S): 200 CAPSULE ORAL at 06:44

## 2018-03-27 RX ADMIN — Medication 975 MILLIGRAM(S): at 22:40

## 2018-03-27 RX ADMIN — TRAMADOL HYDROCHLORIDE 50 MILLIGRAM(S): 50 TABLET ORAL at 06:40

## 2018-03-27 RX ADMIN — CELECOXIB 100 MILLIGRAM(S): 200 CAPSULE ORAL at 19:29

## 2018-03-27 RX ADMIN — TIOTROPIUM BROMIDE 1 CAPSULE(S): 18 CAPSULE ORAL; RESPIRATORY (INHALATION) at 14:38

## 2018-03-27 RX ADMIN — Medication 100 MILLIGRAM(S): at 10:50

## 2018-03-27 RX ADMIN — CELECOXIB 100 MILLIGRAM(S): 200 CAPSULE ORAL at 06:40

## 2018-03-27 RX ADMIN — Medication 975 MILLIGRAM(S): at 17:54

## 2018-03-27 RX ADMIN — MEGESTROL ACETATE 400 MILLIGRAM(S): 40 SUSPENSION ORAL at 14:33

## 2018-03-27 RX ADMIN — BUDESONIDE AND FORMOTEROL FUMARATE DIHYDRATE 2 PUFF(S): 160; 4.5 AEROSOL RESPIRATORY (INHALATION) at 10:50

## 2018-03-27 RX ADMIN — Medication 1 TABLET(S): at 10:49

## 2018-03-27 RX ADMIN — GABAPENTIN 200 MILLIGRAM(S): 400 CAPSULE ORAL at 14:35

## 2018-03-27 RX ADMIN — DULOXETINE HYDROCHLORIDE 60 MILLIGRAM(S): 30 CAPSULE, DELAYED RELEASE ORAL at 10:48

## 2018-03-27 RX ADMIN — Medication 975 MILLIGRAM(S): at 22:06

## 2018-03-27 RX ADMIN — GABAPENTIN 400 MILLIGRAM(S): 400 CAPSULE ORAL at 22:06

## 2018-03-27 RX ADMIN — Medication 975 MILLIGRAM(S): at 06:40

## 2018-03-27 RX ADMIN — Medication 100 MILLIGRAM(S): at 22:06

## 2018-03-27 RX ADMIN — Medication 975 MILLIGRAM(S): at 06:44

## 2018-03-27 RX ADMIN — AMLODIPINE BESYLATE 5 MILLIGRAM(S): 2.5 TABLET ORAL at 06:40

## 2018-03-27 RX ADMIN — BUDESONIDE AND FORMOTEROL FUMARATE DIHYDRATE 2 PUFF(S): 160; 4.5 AEROSOL RESPIRATORY (INHALATION) at 22:07

## 2018-03-27 RX ADMIN — Medication 1 MILLIGRAM(S): at 10:49

## 2018-03-27 RX ADMIN — Medication 500 MILLIGRAM(S): at 10:49

## 2018-03-27 RX ADMIN — TRAMADOL HYDROCHLORIDE 50 MILLIGRAM(S): 50 TABLET ORAL at 06:45

## 2018-03-27 RX ADMIN — Medication 975 MILLIGRAM(S): at 14:33

## 2018-03-27 RX ADMIN — Medication 25 MILLIGRAM(S): at 10:49

## 2018-03-27 RX ADMIN — Medication 1 SPRAY(S): at 06:40

## 2018-03-27 RX ADMIN — GABAPENTIN 200 MILLIGRAM(S): 400 CAPSULE ORAL at 10:49

## 2018-03-27 NOTE — PROGRESS NOTE BEHAVIORAL HEALTH - SUMMARY
Summary (include case differential, formulation and patient response to therapy): 68 yo  female, domiciled alone with past psychiatric history of MDD and anxiety admitted for worsening depression and passive suicidal ideation through decreased PO intake. She continues to report depressed mood. Will titrate Cymbalta to address both mood and pain symptoms. Patient continues to warrant inpatient hospitalization for medication management, symptom stabilization, and safety.       ;;3/27: demonstrates some improvment in mood.;  smiles at times; no SEs with Cymbalta now at 60mg; no SI. 68 yo  female, domiciled alone with past psychiatric history of MDD and anxiety admitted for worsening depression and passive suicidal ideation through decreased PO intake. She continues to report depressed mood. Will titrate Cymbalta to address both mood and pain symptoms. Patient continues to warrant inpatient hospitalization for medication management, symptom stabilization, and safety.       ;;3/27: demonstrates some improvement in mood.;  smiles at times; no SEs with Cymbalta now at 60mg; no SI.  Monitor bp on Cymbalta currently (136/85 - 157/90)  ; wrist occupational therapy to be arranged as an outpatient.  attempting to arrange a social club.

## 2018-03-27 NOTE — PROGRESS NOTE BEHAVIORAL HEALTH - NSBHFUPINTERVALCCFT_PSY_A_CORE
"I realized I have anxiety and not depression.  "  reports good sleep and appetite.  has no pain complaints.  No behavoral issues as per staff.reports good sleep and appetite; has no pain complaints; No behavoral issues as per staff. "I realized I have anxiety and not depression.  "  reports good sleep and appetite.  has no pain complaints.  No behavorial issues as per staff. reports good sleep and appetite; has no pain complaints; No behavorial issues as per staff.

## 2018-03-27 NOTE — PROGRESS NOTE BEHAVIORAL HEALTH - NSBHFUPINTERVALHXFT_PSY_A_CORE
clean.   cognition is intact; denies AVH or s/h i/i/p; speech normal volume, spontaneous, clear; mood and affect slightly anxious.  no strangeness of language use; i&j: fair to poor.  somewhat reflective on situation; can name some of recent medications; feels it would help to talk to a therapist.   Smiles on occasion.

## 2018-03-27 NOTE — PROGRESS NOTE BEHAVIORAL HEALTH - NSBHCHARTREVIEWVS_PSY_A_CORE FT
Vital Signs Last 24 Hrs  T(C): 36.9 (27 Mar 2018 06:16), Max: 37.1 (26 Mar 2018 17:00)  T(F): 98.4 (27 Mar 2018 06:16), Max: 98.7 (26 Mar 2018 17:00)  HR: 105 (27 Mar 2018 06:16) (84 - 105)  BP: 157/90 (27 Mar 2018 06:16) (136/85 - 157/90)  BP(mean): --  RR: 18 (27 Mar 2018 06:16) (18 - 20)  SpO2: -- Vital Signs Last 24 Hrs  T(C): 36.9 (27 Mar 2018 06:16), Max: 37.1 (26 Mar 2018 17:00)  T(F): 98.4 (27 Mar 2018 06:16), Max: 98.7 (26 Mar 2018 17:00)  HR: 105 (27 Mar 2018 06:16) (84 - 105)  BP: 157/90 (27 Mar 2018 06:16) (136/85 - 157/90)  BP(mean): --  RR: 18 (27 Mar 2018 06:16) (18 - 18)  SpO2: --

## 2018-03-27 NOTE — PROGRESS NOTE BEHAVIORAL HEALTH - PROBLEM SELECTOR PLAN 1
-Increased Cymbalta to 60 mg daily  -Continue Seroquel 250 mg qHS. Monitor for sedation  -Continue Trazodone 100 mg qHS  -Continue megestrol 40 mg for appetite stimulation and multivitamin, Vitamin D, Vitamin C, thiamine, and folic acid for malnourishment

## 2018-03-28 PROCEDURE — 99231 SBSQ HOSP IP/OBS SF/LOW 25: CPT

## 2018-03-28 RX ORDER — TRAZODONE HCL 50 MG
1 TABLET ORAL
Qty: 0 | Refills: 0 | COMMUNITY
Start: 2018-03-28

## 2018-03-28 RX ORDER — ACETAMINOPHEN 500 MG
3 TABLET ORAL
Qty: 0 | Refills: 0 | COMMUNITY
Start: 2018-03-28

## 2018-03-28 RX ORDER — CELECOXIB 200 MG/1
1 CAPSULE ORAL
Qty: 0 | Refills: 0 | DISCHARGE
Start: 2018-03-28

## 2018-03-28 RX ORDER — LORATADINE 10 MG/1
1 TABLET ORAL
Qty: 0 | Refills: 0 | DISCHARGE
Start: 2018-03-28

## 2018-03-28 RX ORDER — TRAMADOL HYDROCHLORIDE 50 MG/1
1 TABLET ORAL
Qty: 7 | Refills: 0 | OUTPATIENT
Start: 2018-03-28 | End: 2018-04-03

## 2018-03-28 RX ORDER — THIAMINE MONONITRATE (VIT B1) 100 MG
1 TABLET ORAL
Qty: 0 | Refills: 0 | DISCHARGE
Start: 2018-03-28

## 2018-03-28 RX ORDER — METOPROLOL TARTRATE 50 MG
1 TABLET ORAL
Qty: 0 | Refills: 0 | DISCHARGE
Start: 2018-03-28

## 2018-03-28 RX ORDER — ACETAMINOPHEN 500 MG
3 TABLET ORAL
Qty: 0 | Refills: 0 | DISCHARGE
Start: 2018-03-28

## 2018-03-28 RX ORDER — DULOXETINE HYDROCHLORIDE 30 MG/1
1 CAPSULE, DELAYED RELEASE ORAL
Qty: 15 | Refills: 0 | OUTPATIENT
Start: 2018-03-28 | End: 2018-04-11

## 2018-03-28 RX ORDER — CELECOXIB 200 MG/1
1 CAPSULE ORAL
Qty: 0 | Refills: 0 | COMMUNITY
Start: 2018-03-28

## 2018-03-28 RX ORDER — GABAPENTIN 400 MG/1
1 CAPSULE ORAL
Qty: 15 | Refills: 0 | OUTPATIENT
Start: 2018-03-28 | End: 2018-04-11

## 2018-03-28 RX ORDER — QUETIAPINE FUMARATE 200 MG/1
5 TABLET, FILM COATED ORAL
Qty: 75 | Refills: 0 | OUTPATIENT
Start: 2018-03-28 | End: 2018-04-11

## 2018-03-28 RX ORDER — DULOXETINE HYDROCHLORIDE 30 MG/1
1 CAPSULE, DELAYED RELEASE ORAL
Qty: 0 | Refills: 0 | COMMUNITY
Start: 2018-03-28

## 2018-03-28 RX ORDER — GABAPENTIN 400 MG/1
2 CAPSULE ORAL
Qty: 60 | Refills: 0 | OUTPATIENT
Start: 2018-03-28 | End: 2018-04-11

## 2018-03-28 RX ORDER — FOLIC ACID 0.8 MG
1 TABLET ORAL
Qty: 0 | Refills: 0 | DISCHARGE
Start: 2018-03-28

## 2018-03-28 RX ORDER — ATORVASTATIN CALCIUM 80 MG/1
1 TABLET, FILM COATED ORAL
Qty: 0 | Refills: 0 | DISCHARGE
Start: 2018-03-28

## 2018-03-28 RX ORDER — AMLODIPINE BESYLATE 2.5 MG/1
1 TABLET ORAL
Qty: 0 | Refills: 0 | DISCHARGE
Start: 2018-03-28

## 2018-03-28 RX ADMIN — QUETIAPINE FUMARATE 250 MILLIGRAM(S): 200 TABLET, FILM COATED ORAL at 22:01

## 2018-03-28 RX ADMIN — GABAPENTIN 200 MILLIGRAM(S): 400 CAPSULE ORAL at 13:05

## 2018-03-28 RX ADMIN — ATORVASTATIN CALCIUM 10 MILLIGRAM(S): 80 TABLET, FILM COATED ORAL at 22:00

## 2018-03-28 RX ADMIN — GABAPENTIN 200 MILLIGRAM(S): 400 CAPSULE ORAL at 10:25

## 2018-03-28 RX ADMIN — MEGESTROL ACETATE 400 MILLIGRAM(S): 40 SUSPENSION ORAL at 11:40

## 2018-03-28 RX ADMIN — CELECOXIB 100 MILLIGRAM(S): 200 CAPSULE ORAL at 17:57

## 2018-03-28 RX ADMIN — TRAMADOL HYDROCHLORIDE 50 MILLIGRAM(S): 50 TABLET ORAL at 06:57

## 2018-03-28 RX ADMIN — Medication 100 MILLIGRAM(S): at 10:25

## 2018-03-28 RX ADMIN — BUDESONIDE AND FORMOTEROL FUMARATE DIHYDRATE 2 PUFF(S): 160; 4.5 AEROSOL RESPIRATORY (INHALATION) at 22:02

## 2018-03-28 RX ADMIN — Medication 975 MILLIGRAM(S): at 06:56

## 2018-03-28 RX ADMIN — Medication 975 MILLIGRAM(S): at 22:32

## 2018-03-28 RX ADMIN — AMLODIPINE BESYLATE 5 MILLIGRAM(S): 2.5 TABLET ORAL at 06:55

## 2018-03-28 RX ADMIN — BUDESONIDE AND FORMOTEROL FUMARATE DIHYDRATE 2 PUFF(S): 160; 4.5 AEROSOL RESPIRATORY (INHALATION) at 10:28

## 2018-03-28 RX ADMIN — GABAPENTIN 400 MILLIGRAM(S): 400 CAPSULE ORAL at 22:01

## 2018-03-28 RX ADMIN — Medication 975 MILLIGRAM(S): at 13:04

## 2018-03-28 RX ADMIN — CELECOXIB 100 MILLIGRAM(S): 200 CAPSULE ORAL at 06:55

## 2018-03-28 RX ADMIN — Medication 1 MILLIGRAM(S): at 10:25

## 2018-03-28 RX ADMIN — Medication 1 TABLET(S): at 10:25

## 2018-03-28 RX ADMIN — Medication 500 MILLIGRAM(S): at 11:41

## 2018-03-28 RX ADMIN — TIOTROPIUM BROMIDE 1 CAPSULE(S): 18 CAPSULE ORAL; RESPIRATORY (INHALATION) at 11:42

## 2018-03-28 RX ADMIN — Medication 975 MILLIGRAM(S): at 22:00

## 2018-03-28 RX ADMIN — Medication 25 MILLIGRAM(S): at 10:25

## 2018-03-28 RX ADMIN — Medication 100 MILLIGRAM(S): at 22:01

## 2018-03-28 RX ADMIN — DULOXETINE HYDROCHLORIDE 60 MILLIGRAM(S): 30 CAPSULE, DELAYED RELEASE ORAL at 10:26

## 2018-03-28 NOTE — PROGRESS NOTE BEHAVIORAL HEALTH - NSBHADMITDANGERSELF_PSY_A_CORE
suicidal behavior/unable to care for self
unable to care for self/suicidal behavior
suicidal behavior/unable to care for self
unable to care for self/suicidal behavior
suicidal behavior/unable to care for self
unable to care for self/suicidal behavior

## 2018-03-28 NOTE — PROGRESS NOTE BEHAVIORAL HEALTH - PRIMARY DX
Major depressive disorder, recurrent episode with anxious distress

## 2018-03-28 NOTE — PROGRESS NOTE BEHAVIORAL HEALTH - NSBHCHARTREVIEWVS_PSY_A_CORE FT
Vital Signs Last 24 Hrs  T(C): 37.2 (28 Mar 2018 06:24), Max: 37.2 (28 Mar 2018 06:24)  T(F): 99 (28 Mar 2018 06:24), Max: 99 (28 Mar 2018 06:24)  HR: 99 (28 Mar 2018 06:24) (99 - 109)  BP: 144/87 (28 Mar 2018 06:24) (133/82 - 144/87)  BP(mean): --  RR: 18 (28 Mar 2018 06:24) (18 - 20)  SpO2: --

## 2018-03-28 NOTE — PROGRESS NOTE BEHAVIORAL HEALTH - PROBLEM SELECTOR PLAN 1
-Continue Cymbalta 60 mg daily  -Continue Seroquel 250 mg qHS. Monitor for sedation  -Continue Trazodone 100 mg qHS  -Continue megestrol 40 mg for appetite stimulation and multivitamin, Vitamin D, Vitamin C, thiamine, and folic acid for malnourishment

## 2018-03-28 NOTE — PROGRESS NOTE BEHAVIORAL HEALTH - SUMMARY
68 yo  female, domiciled alone with past psychiatric history of MDD and anxiety admitted for worsening depression and passive suicidal ideation through decreased PO intake. She is now improving and has better insight into her anxiety and depression. She reports no side effects with Cymbalta. Discharge planning in progress. Patient continues to warrant inpatient hospitalization for medication management, symptom stabilization, and safety.

## 2018-03-28 NOTE — PROGRESS NOTE BEHAVIORAL HEALTH - SECONDARY DX1
Deferred condition on axis II

## 2018-03-28 NOTE — PROGRESS NOTE BEHAVIORAL HEALTH - NSBHFUPINTERVALHXFT_PSY_A_CORE
Case was discussed with treatment team and chart was reviewed. Patient was compliant with all meds. As per nursing report, she has been visible on the unit, pleasant, and well-related. She states that her pain is well-controlled. Patient reports improved mood, and good sleep and appetite. She is tolerating Cymbalta. Denies any manic/hypomanic symptoms, paranoid thoughts/delusions, perceptual disturbances, SI/HI.

## 2018-03-29 RX ORDER — TRAMADOL HYDROCHLORIDE 50 MG/1
1 TABLET ORAL
Qty: 7 | Refills: 0 | OUTPATIENT
Start: 2018-03-29 | End: 2018-04-04

## 2018-03-29 RX ADMIN — Medication 1 TABLET(S): at 09:49

## 2018-03-29 RX ADMIN — Medication 975 MILLIGRAM(S): at 07:04

## 2018-03-29 RX ADMIN — CELECOXIB 100 MILLIGRAM(S): 200 CAPSULE ORAL at 17:18

## 2018-03-29 RX ADMIN — Medication 1 MILLIGRAM(S): at 09:48

## 2018-03-29 RX ADMIN — CELECOXIB 100 MILLIGRAM(S): 200 CAPSULE ORAL at 18:20

## 2018-03-29 RX ADMIN — TRAMADOL HYDROCHLORIDE 50 MILLIGRAM(S): 50 TABLET ORAL at 07:05

## 2018-03-29 RX ADMIN — Medication 975 MILLIGRAM(S): at 23:00

## 2018-03-29 RX ADMIN — ATORVASTATIN CALCIUM 10 MILLIGRAM(S): 80 TABLET, FILM COATED ORAL at 22:06

## 2018-03-29 RX ADMIN — Medication 975 MILLIGRAM(S): at 08:00

## 2018-03-29 RX ADMIN — BUDESONIDE AND FORMOTEROL FUMARATE DIHYDRATE 2 PUFF(S): 160; 4.5 AEROSOL RESPIRATORY (INHALATION) at 22:05

## 2018-03-29 RX ADMIN — AMLODIPINE BESYLATE 5 MILLIGRAM(S): 2.5 TABLET ORAL at 07:04

## 2018-03-29 RX ADMIN — Medication 500 MILLIGRAM(S): at 09:49

## 2018-03-29 RX ADMIN — GABAPENTIN 200 MILLIGRAM(S): 400 CAPSULE ORAL at 09:48

## 2018-03-29 RX ADMIN — GABAPENTIN 400 MILLIGRAM(S): 400 CAPSULE ORAL at 22:06

## 2018-03-29 RX ADMIN — GABAPENTIN 200 MILLIGRAM(S): 400 CAPSULE ORAL at 13:20

## 2018-03-29 RX ADMIN — CELECOXIB 100 MILLIGRAM(S): 200 CAPSULE ORAL at 08:00

## 2018-03-29 RX ADMIN — QUETIAPINE FUMARATE 250 MILLIGRAM(S): 200 TABLET, FILM COATED ORAL at 22:06

## 2018-03-29 RX ADMIN — DULOXETINE HYDROCHLORIDE 60 MILLIGRAM(S): 30 CAPSULE, DELAYED RELEASE ORAL at 09:49

## 2018-03-29 RX ADMIN — CELECOXIB 100 MILLIGRAM(S): 200 CAPSULE ORAL at 07:04

## 2018-03-29 RX ADMIN — Medication 975 MILLIGRAM(S): at 22:07

## 2018-03-29 RX ADMIN — Medication 25 MILLIGRAM(S): at 09:49

## 2018-03-29 RX ADMIN — BUDESONIDE AND FORMOTEROL FUMARATE DIHYDRATE 2 PUFF(S): 160; 4.5 AEROSOL RESPIRATORY (INHALATION) at 09:49

## 2018-03-29 RX ADMIN — MEGESTROL ACETATE 400 MILLIGRAM(S): 40 SUSPENSION ORAL at 10:52

## 2018-03-29 RX ADMIN — Medication 100 MILLIGRAM(S): at 22:06

## 2018-03-29 RX ADMIN — Medication 100 MILLIGRAM(S): at 09:49

## 2018-03-30 RX ADMIN — GABAPENTIN 400 MILLIGRAM(S): 400 CAPSULE ORAL at 22:17

## 2018-03-30 RX ADMIN — Medication 100 MILLIGRAM(S): at 22:18

## 2018-03-30 RX ADMIN — TRAMADOL HYDROCHLORIDE 50 MILLIGRAM(S): 50 TABLET ORAL at 10:18

## 2018-03-30 RX ADMIN — Medication 975 MILLIGRAM(S): at 07:50

## 2018-03-30 RX ADMIN — GABAPENTIN 200 MILLIGRAM(S): 400 CAPSULE ORAL at 13:32

## 2018-03-30 RX ADMIN — BUDESONIDE AND FORMOTEROL FUMARATE DIHYDRATE 2 PUFF(S): 160; 4.5 AEROSOL RESPIRATORY (INHALATION) at 22:17

## 2018-03-30 RX ADMIN — TIOTROPIUM BROMIDE 1 CAPSULE(S): 18 CAPSULE ORAL; RESPIRATORY (INHALATION) at 13:37

## 2018-03-30 RX ADMIN — Medication 975 MILLIGRAM(S): at 22:17

## 2018-03-30 RX ADMIN — CELECOXIB 100 MILLIGRAM(S): 200 CAPSULE ORAL at 07:50

## 2018-03-30 RX ADMIN — Medication 1 SPRAY(S): at 06:59

## 2018-03-30 RX ADMIN — CELECOXIB 100 MILLIGRAM(S): 200 CAPSULE ORAL at 18:21

## 2018-03-30 RX ADMIN — Medication 1 MILLIGRAM(S): at 10:20

## 2018-03-30 RX ADMIN — CELECOXIB 100 MILLIGRAM(S): 200 CAPSULE ORAL at 17:51

## 2018-03-30 RX ADMIN — Medication 1 TABLET(S): at 10:20

## 2018-03-30 RX ADMIN — CELECOXIB 100 MILLIGRAM(S): 200 CAPSULE ORAL at 07:01

## 2018-03-30 RX ADMIN — DULOXETINE HYDROCHLORIDE 60 MILLIGRAM(S): 30 CAPSULE, DELAYED RELEASE ORAL at 10:24

## 2018-03-30 RX ADMIN — GABAPENTIN 200 MILLIGRAM(S): 400 CAPSULE ORAL at 10:20

## 2018-03-30 RX ADMIN — Medication 25 MILLIGRAM(S): at 10:20

## 2018-03-30 RX ADMIN — BUDESONIDE AND FORMOTEROL FUMARATE DIHYDRATE 2 PUFF(S): 160; 4.5 AEROSOL RESPIRATORY (INHALATION) at 10:18

## 2018-03-30 RX ADMIN — AMLODIPINE BESYLATE 5 MILLIGRAM(S): 2.5 TABLET ORAL at 06:58

## 2018-03-30 RX ADMIN — ERGOCALCIFEROL 50000 UNIT(S): 1.25 CAPSULE ORAL at 10:19

## 2018-03-30 RX ADMIN — Medication 975 MILLIGRAM(S): at 13:32

## 2018-03-30 RX ADMIN — QUETIAPINE FUMARATE 250 MILLIGRAM(S): 200 TABLET, FILM COATED ORAL at 22:17

## 2018-03-30 RX ADMIN — Medication 975 MILLIGRAM(S): at 06:58

## 2018-03-30 RX ADMIN — ALBUTEROL 2 PUFF(S): 90 AEROSOL, METERED ORAL at 07:01

## 2018-03-30 RX ADMIN — TRAMADOL HYDROCHLORIDE 50 MILLIGRAM(S): 50 TABLET ORAL at 10:48

## 2018-03-30 RX ADMIN — Medication 975 MILLIGRAM(S): at 17:09

## 2018-03-30 RX ADMIN — Medication 100 MILLIGRAM(S): at 10:25

## 2018-03-30 RX ADMIN — ATORVASTATIN CALCIUM 10 MILLIGRAM(S): 80 TABLET, FILM COATED ORAL at 22:18

## 2018-03-30 RX ADMIN — Medication 975 MILLIGRAM(S): at 23:15

## 2018-03-30 RX ADMIN — MEGESTROL ACETATE 400 MILLIGRAM(S): 40 SUSPENSION ORAL at 13:36

## 2018-03-30 RX ADMIN — Medication 500 MILLIGRAM(S): at 10:19

## 2018-03-31 RX ADMIN — Medication 1 SPRAY(S): at 18:09

## 2018-03-31 RX ADMIN — ALBUTEROL 2 PUFF(S): 90 AEROSOL, METERED ORAL at 13:09

## 2018-03-31 RX ADMIN — Medication 500 MILLIGRAM(S): at 14:50

## 2018-03-31 RX ADMIN — Medication 1 SPRAY(S): at 07:19

## 2018-03-31 RX ADMIN — Medication 975 MILLIGRAM(S): at 23:00

## 2018-03-31 RX ADMIN — Medication 1 TABLET(S): at 10:28

## 2018-03-31 RX ADMIN — GABAPENTIN 200 MILLIGRAM(S): 400 CAPSULE ORAL at 13:10

## 2018-03-31 RX ADMIN — BUDESONIDE AND FORMOTEROL FUMARATE DIHYDRATE 2 PUFF(S): 160; 4.5 AEROSOL RESPIRATORY (INHALATION) at 10:30

## 2018-03-31 RX ADMIN — Medication 25 MILLIGRAM(S): at 10:28

## 2018-03-31 RX ADMIN — GABAPENTIN 200 MILLIGRAM(S): 400 CAPSULE ORAL at 10:29

## 2018-03-31 RX ADMIN — Medication 1 MILLIGRAM(S): at 10:50

## 2018-03-31 RX ADMIN — BUDESONIDE AND FORMOTEROL FUMARATE DIHYDRATE 2 PUFF(S): 160; 4.5 AEROSOL RESPIRATORY (INHALATION) at 22:12

## 2018-03-31 RX ADMIN — Medication 100 MILLIGRAM(S): at 10:30

## 2018-03-31 RX ADMIN — GABAPENTIN 400 MILLIGRAM(S): 400 CAPSULE ORAL at 22:11

## 2018-03-31 RX ADMIN — Medication 975 MILLIGRAM(S): at 22:10

## 2018-03-31 RX ADMIN — Medication 975 MILLIGRAM(S): at 14:51

## 2018-03-31 RX ADMIN — AMLODIPINE BESYLATE 5 MILLIGRAM(S): 2.5 TABLET ORAL at 07:18

## 2018-03-31 RX ADMIN — TIOTROPIUM BROMIDE 1 CAPSULE(S): 18 CAPSULE ORAL; RESPIRATORY (INHALATION) at 22:12

## 2018-03-31 RX ADMIN — Medication 975 MILLIGRAM(S): at 18:10

## 2018-03-31 RX ADMIN — CELECOXIB 100 MILLIGRAM(S): 200 CAPSULE ORAL at 07:18

## 2018-03-31 RX ADMIN — CELECOXIB 100 MILLIGRAM(S): 200 CAPSULE ORAL at 18:09

## 2018-03-31 RX ADMIN — Medication 100 MILLIGRAM(S): at 22:12

## 2018-03-31 RX ADMIN — MEGESTROL ACETATE 400 MILLIGRAM(S): 40 SUSPENSION ORAL at 10:16

## 2018-03-31 RX ADMIN — CELECOXIB 100 MILLIGRAM(S): 200 CAPSULE ORAL at 18:11

## 2018-03-31 RX ADMIN — ATORVASTATIN CALCIUM 10 MILLIGRAM(S): 80 TABLET, FILM COATED ORAL at 22:11

## 2018-03-31 RX ADMIN — QUETIAPINE FUMARATE 250 MILLIGRAM(S): 200 TABLET, FILM COATED ORAL at 22:11

## 2018-03-31 RX ADMIN — DULOXETINE HYDROCHLORIDE 60 MILLIGRAM(S): 30 CAPSULE, DELAYED RELEASE ORAL at 10:28

## 2018-03-31 RX ADMIN — Medication 975 MILLIGRAM(S): at 07:18

## 2018-03-31 NOTE — PROGRESS NOTE BEHAVIORAL HEALTH - RISK ASSESSMENT
Not a danger to herself or others at this time. She is future-oriented and interested in outpatient f/up.

## 2018-03-31 NOTE — PROGRESS NOTE BEHAVIORAL HEALTH - SUMMARY
The pt has chronic depression and is socially isolated.  She has improved steadily over this admission.

## 2018-03-31 NOTE — PROGRESS NOTE BEHAVIORAL HEALTH - NSBHFUPINTERVALCCFT_PSY_A_CORE
I have covering the pt for Dr. Lamont Craven's service for Thur 3/29 and Fri 3/30/18.  The pt has been visible on the unit, quiet but pleasant.  She has been compliant with her psych meds.    She has an appt for outpatient f/up in 5/16/18. She expressed concern to me about it being "too far off" and asked where she would get her psych meds in the interim if she is discharged "with only a two weeks' supply".  She had not wanted to take an earlier appt at [facility].    I suggested that in this case the pt might be able to get discharge prescriptions for a month with one refill to cover her until her outpatient appt.  She seemed satisfied with this.   The pt. also c/o of a sore throat and was offered Cepacol lozenges.

## 2018-03-31 NOTE — PROGRESS NOTE BEHAVIORAL HEALTH - ADDITIONAL DETAILS / COMMENTS
Pt presented as well-related and interested in interaction. She sat in the area near the nurses station. Her mood was bright and she appeared calm. Her speech was soft in volume but she was communicative and advocated for herself.  She firmly denied any SI/HI.

## 2018-04-01 RX ORDER — BENZOCAINE AND MENTHOL 5; 1 G/100ML; G/100ML
1 LIQUID ORAL EVERY 4 HOURS
Qty: 0 | Refills: 0 | Status: DISCONTINUED | OUTPATIENT
Start: 2018-04-01 | End: 2018-04-02

## 2018-04-01 RX ADMIN — CELECOXIB 100 MILLIGRAM(S): 200 CAPSULE ORAL at 06:54

## 2018-04-01 RX ADMIN — Medication 25 MILLIGRAM(S): at 10:51

## 2018-04-01 RX ADMIN — Medication 975 MILLIGRAM(S): at 23:00

## 2018-04-01 RX ADMIN — Medication 500 MILLIGRAM(S): at 10:51

## 2018-04-01 RX ADMIN — GABAPENTIN 400 MILLIGRAM(S): 400 CAPSULE ORAL at 21:56

## 2018-04-01 RX ADMIN — Medication 975 MILLIGRAM(S): at 21:55

## 2018-04-01 RX ADMIN — Medication 1 MILLIGRAM(S): at 10:51

## 2018-04-01 RX ADMIN — Medication 1 SPRAY(S): at 06:51

## 2018-04-01 RX ADMIN — MEGESTROL ACETATE 400 MILLIGRAM(S): 40 SUSPENSION ORAL at 10:49

## 2018-04-01 RX ADMIN — Medication 975 MILLIGRAM(S): at 14:50

## 2018-04-01 RX ADMIN — QUETIAPINE FUMARATE 250 MILLIGRAM(S): 200 TABLET, FILM COATED ORAL at 21:56

## 2018-04-01 RX ADMIN — BUDESONIDE AND FORMOTEROL FUMARATE DIHYDRATE 2 PUFF(S): 160; 4.5 AEROSOL RESPIRATORY (INHALATION) at 10:53

## 2018-04-01 RX ADMIN — Medication 100 MILLIGRAM(S): at 10:51

## 2018-04-01 RX ADMIN — CELECOXIB 100 MILLIGRAM(S): 200 CAPSULE ORAL at 06:51

## 2018-04-01 RX ADMIN — BENZOCAINE AND MENTHOL 1 LOZENGE: 5; 1 LIQUID ORAL at 18:30

## 2018-04-01 RX ADMIN — CELECOXIB 100 MILLIGRAM(S): 200 CAPSULE ORAL at 18:23

## 2018-04-01 RX ADMIN — AMLODIPINE BESYLATE 5 MILLIGRAM(S): 2.5 TABLET ORAL at 06:50

## 2018-04-01 RX ADMIN — Medication 1 TABLET(S): at 10:51

## 2018-04-01 RX ADMIN — BUDESONIDE AND FORMOTEROL FUMARATE DIHYDRATE 2 PUFF(S): 160; 4.5 AEROSOL RESPIRATORY (INHALATION) at 21:56

## 2018-04-01 RX ADMIN — Medication 100 MILLIGRAM(S): at 21:56

## 2018-04-01 RX ADMIN — Medication 975 MILLIGRAM(S): at 06:54

## 2018-04-01 RX ADMIN — Medication 975 MILLIGRAM(S): at 06:51

## 2018-04-01 RX ADMIN — ATORVASTATIN CALCIUM 10 MILLIGRAM(S): 80 TABLET, FILM COATED ORAL at 21:56

## 2018-04-01 RX ADMIN — TIOTROPIUM BROMIDE 1 CAPSULE(S): 18 CAPSULE ORAL; RESPIRATORY (INHALATION) at 14:50

## 2018-04-01 RX ADMIN — DULOXETINE HYDROCHLORIDE 60 MILLIGRAM(S): 30 CAPSULE, DELAYED RELEASE ORAL at 10:51

## 2018-04-01 RX ADMIN — GABAPENTIN 200 MILLIGRAM(S): 400 CAPSULE ORAL at 14:49

## 2018-04-01 RX ADMIN — GABAPENTIN 200 MILLIGRAM(S): 400 CAPSULE ORAL at 10:51

## 2018-04-01 RX ADMIN — Medication 975 MILLIGRAM(S): at 15:00

## 2018-04-02 VITALS
RESPIRATION RATE: 18 BRPM | TEMPERATURE: 99 F | DIASTOLIC BLOOD PRESSURE: 74 MMHG | HEART RATE: 103 BPM | SYSTOLIC BLOOD PRESSURE: 110 MMHG

## 2018-04-02 PROCEDURE — 85730 THROMBOPLASTIN TIME PARTIAL: CPT

## 2018-04-02 PROCEDURE — 80053 COMPREHEN METABOLIC PANEL: CPT

## 2018-04-02 PROCEDURE — 71045 X-RAY EXAM CHEST 1 VIEW: CPT

## 2018-04-02 PROCEDURE — 36415 COLL VENOUS BLD VENIPUNCTURE: CPT

## 2018-04-02 PROCEDURE — 99285 EMERGENCY DEPT VISIT HI MDM: CPT | Mod: 25

## 2018-04-02 PROCEDURE — 97110 THERAPEUTIC EXERCISES: CPT

## 2018-04-02 PROCEDURE — 81001 URINALYSIS AUTO W/SCOPE: CPT

## 2018-04-02 PROCEDURE — 83036 HEMOGLOBIN GLYCOSYLATED A1C: CPT

## 2018-04-02 PROCEDURE — 80048 BASIC METABOLIC PNL TOTAL CA: CPT

## 2018-04-02 PROCEDURE — 96374 THER/PROPH/DIAG INJ IV PUSH: CPT

## 2018-04-02 PROCEDURE — 94640 AIRWAY INHALATION TREATMENT: CPT

## 2018-04-02 PROCEDURE — 80061 LIPID PANEL: CPT

## 2018-04-02 PROCEDURE — 84100 ASSAY OF PHOSPHORUS: CPT

## 2018-04-02 PROCEDURE — 85025 COMPLETE CBC W/AUTO DIFF WBC: CPT

## 2018-04-02 PROCEDURE — 83735 ASSAY OF MAGNESIUM: CPT

## 2018-04-02 PROCEDURE — 85610 PROTHROMBIN TIME: CPT

## 2018-04-02 PROCEDURE — 93005 ELECTROCARDIOGRAM TRACING: CPT

## 2018-04-02 RX ORDER — TRAMADOL HYDROCHLORIDE 50 MG/1
1 TABLET ORAL
Qty: 7 | Refills: 0
Start: 2018-04-02 | End: 2018-04-08

## 2018-04-02 RX ORDER — TRAMADOL HYDROCHLORIDE 50 MG/1
1 TABLET ORAL
Qty: 7 | Refills: 0 | OUTPATIENT
Start: 2018-04-02 | End: 2018-04-08

## 2018-04-02 RX ADMIN — Medication 100 MILLIGRAM(S): at 10:31

## 2018-04-02 RX ADMIN — CELECOXIB 100 MILLIGRAM(S): 200 CAPSULE ORAL at 06:58

## 2018-04-02 RX ADMIN — BENZOCAINE AND MENTHOL 1 LOZENGE: 5; 1 LIQUID ORAL at 10:32

## 2018-04-02 RX ADMIN — Medication 1 TABLET(S): at 10:28

## 2018-04-02 RX ADMIN — Medication 975 MILLIGRAM(S): at 06:58

## 2018-04-02 RX ADMIN — Medication 500 MILLIGRAM(S): at 10:28

## 2018-04-02 RX ADMIN — AMLODIPINE BESYLATE 5 MILLIGRAM(S): 2.5 TABLET ORAL at 06:58

## 2018-04-02 RX ADMIN — MEGESTROL ACETATE 400 MILLIGRAM(S): 40 SUSPENSION ORAL at 10:30

## 2018-04-02 RX ADMIN — Medication 25 MILLIGRAM(S): at 10:28

## 2018-04-02 RX ADMIN — Medication 975 MILLIGRAM(S): at 07:15

## 2018-04-02 RX ADMIN — GABAPENTIN 200 MILLIGRAM(S): 400 CAPSULE ORAL at 10:28

## 2018-04-02 RX ADMIN — DULOXETINE HYDROCHLORIDE 60 MILLIGRAM(S): 30 CAPSULE, DELAYED RELEASE ORAL at 10:28

## 2018-04-02 RX ADMIN — BUDESONIDE AND FORMOTEROL FUMARATE DIHYDRATE 2 PUFF(S): 160; 4.5 AEROSOL RESPIRATORY (INHALATION) at 10:31

## 2018-04-02 RX ADMIN — Medication 1 MILLIGRAM(S): at 10:28

## 2018-04-02 NOTE — PROGRESS NOTE BEHAVIORAL HEALTH - PERCEPTIONS
No abnormalities

## 2018-04-02 NOTE — PROGRESS NOTE BEHAVIORAL HEALTH - NSBHADMITIPOBSFT_PSY_A_CORE
for discharge
as per unit
stable for discharge

## 2018-04-02 NOTE — PROGRESS NOTE BEHAVIORAL HEALTH - NSBHADMITMEDEDUDETAILS_A_CORE FT
risks/benefits discussed

## 2018-04-02 NOTE — PROGRESS NOTE BEHAVIORAL HEALTH - NSBHADMITCOUNSEL_PSY_A_CORE
diagnostic results/impressions and/or recommended studies
client/family/caregiver education/risks and benefits of treatment options/prognosis/instructions for management, treatment and follow up/risk factor reduction
risks and benefits of treatment options/client/family/caregiver education/instructions for management, treatment and follow up/risk factor reduction/prognosis
risks and benefits of treatment options/instructions for management, treatment and follow up/risk factor reduction/client/family/caregiver education/prognosis
prognosis/instructions for management, treatment and follow up/risk factor reduction/risks and benefits of treatment options/client/family/caregiver education
client/family/caregiver education/risk factor reduction/prognosis/instructions for management, treatment and follow up/risks and benefits of treatment options
client/family/caregiver education/risks and benefits of treatment options/importance of adherence to chosen treatment/prognosis/instructions for management, treatment and follow up/risk factor reduction
diagnostic results/impressions and/or recommended studies/risks and benefits of treatment options/risk factor reduction/client/family/caregiver education/importance of adherence to chosen treatment/prognosis/instructions for management, treatment and follow up
risk factor reduction/prognosis/instructions for management, treatment and follow up/client/family/caregiver education/risks and benefits of treatment options/importance of adherence to chosen treatment
importance of adherence to chosen treatment/client/family/caregiver education/risks and benefits of treatment options/prognosis/instructions for management, treatment and follow up/risk factor reduction
instructions for management, treatment and follow up/risk factor reduction/client/family/caregiver education/risks and benefits of treatment options/prognosis

## 2018-04-02 NOTE — PROGRESS NOTE BEHAVIORAL HEALTH - PROBLEM SELECTOR PROBLEM 5
Hyperlipidemia

## 2018-04-02 NOTE — PROGRESS NOTE BEHAVIORAL HEALTH - LANGUAGE
No abnormalities noted

## 2018-04-02 NOTE — PROGRESS NOTE BEHAVIORAL HEALTH - PROBLEM SELECTOR PLAN 2
-Continue Norvasc 5 mg and metoprolol ER 25 mg daily
-Continue Norvasc 5 mg
-Continue Norvasc 5 mg and metoprolol ER 25 mg daily
-Continue Norvasc 5 mg and metoprolol ER 25 mg daily
-Continue Norvasc 5 mg
-Continue Norvasc 5 mg
-Continue Norvasc 5 mg and metoprolol ER 25 mg daily

## 2018-04-02 NOTE — PROGRESS NOTE BEHAVIORAL HEALTH - NSBHFUPTYPE_PSY_A_CORE
Inpatient

## 2018-04-02 NOTE — PROGRESS NOTE BEHAVIORAL HEALTH - PROBLEM SELECTOR PROBLEM 3
Back pain

## 2018-04-02 NOTE — PROGRESS NOTE BEHAVIORAL HEALTH - SUMMARY
66 yo  female, domiciled alone with past psychiatric history of MDD and anxiety admitted for worsening depression and passive suicidal ideation through decreased PO intake. Her depression has improved and her PO intake has also significantly improved. Her insight and judgment are intact. She is stable for discharge today.

## 2018-04-02 NOTE — PROGRESS NOTE BEHAVIORAL HEALTH - AFFECT CONGRUENCE
Congruent

## 2018-04-02 NOTE — PROGRESS NOTE BEHAVIORAL HEALTH - NS ED [facility] REVIEW OF ED CHART AVAILABLE LABS REVIEWED
None available
Yes
None available

## 2018-04-02 NOTE — PROGRESS NOTE BEHAVIORAL HEALTH - PROBLEM SELECTOR PLAN 5
-Continue atorvastatin

## 2018-04-02 NOTE — PROGRESS NOTE BEHAVIORAL HEALTH - NSBHFUPVIOL_PSY_A_CORE
none known

## 2018-04-02 NOTE — PROGRESS NOTE BEHAVIORAL HEALTH - PROBLEM SELECTOR PROBLEM 1
Major depressive disorder, recurrent episode with anxious distress

## 2018-04-02 NOTE — PROGRESS NOTE BEHAVIORAL HEALTH - ESTIMATED DISCHARGE DATE
02-Apr-2018
23-Mar-2018
28-Mar-2018
23-Mar-2018
23-Mar-2018
29-Mar-2018
19-Mar-2018
28-Mar-2018
28-Mar-2018
19-Mar-2018
19-Mar-2018
22-Mar-2018

## 2018-04-02 NOTE — PROGRESS NOTE BEHAVIORAL HEALTH - NSBHCHARTREVIEWVS_PSY_A_CORE FT
Vital Signs Last 24 Hrs  T(C): 36.9 (02 Apr 2018 06:32), Max: 37 (01 Apr 2018 16:51)  T(F): 98.5 (02 Apr 2018 06:32), Max: 98.6 (01 Apr 2018 16:51)  HR: 93 (02 Apr 2018 06:32) (93 - 101)  BP: 128/83 (02 Apr 2018 06:32) (128/83 - 150/88)  BP(mean): --  RR: 18 (02 Apr 2018 06:32) (18 - 18)  SpO2: --

## 2018-04-02 NOTE — PROGRESS NOTE BEHAVIORAL HEALTH - PROBLEM SELECTOR PROBLEM 4
Emphysema lung

## 2018-04-02 NOTE — PROGRESS NOTE BEHAVIORAL HEALTH - PROBLEM SELECTOR PLAN 1
-15-day supply of meds was sent to preferred pharmacy  -Continue Cymbalta 60 mg daily  -Continue Seroquel 250 mg qHS. Monitor for sedation  -Continue Trazodone 100 mg qHS  -Continue megestrol 40 mg for appetite stimulation and multivitamin, Vitamin D, Vitamin C, thiamine, and folic acid for malnourishment

## 2018-04-02 NOTE — PROGRESS NOTE BEHAVIORAL HEALTH - NSBHADMITCOORDWITH_PSY_A_CORE
social work/medical staff/other
medical staff/social work/other
medical staff/social work/other
social work/medical staff/other
other/medical staff/social work
other/medical staff/social work
medical staff/social work/other
medical staff/social work/other
other/medical staff/social work
medical staff/social work/other

## 2018-04-02 NOTE — PROGRESS NOTE BEHAVIORAL HEALTH - NSBHPTASSESSDT_PSY_A_CORE
14-Mar-2018 08:51
15-Mar-2018 08:50
16-Mar-2018 08:48
20-Mar-2018 08:53
23-Mar-2018 08:52
28-Mar-2018 08:29
02-Apr-2018 08:56
26-Mar-2018 08:46
27-Mar-2018 08:41
21-Mar-2018 08:40
22-Mar-2018 08:47
19-Mar-2018 09:06
31-Mar-2018 17:30

## 2018-04-02 NOTE — PROGRESS NOTE BEHAVIORAL HEALTH - NSBHATTESTSEENBY_PSY_A_CORE
attending Psychiatrist without NP/Trainee
Attending Psychiatrist supervising NP/Trainee, meeting pt...
attending Psychiatrist without NP/Trainee
Attending Psychiatrist supervising NP/Trainee, meeting pt...
attending Psychiatrist without NP/Trainee
Attending Psychiatrist supervising NP/Trainee, meeting pt...
Attending Psychiatrist supervising NP/Trainee, meeting pt...

## 2018-04-02 NOTE — PROGRESS NOTE BEHAVIORAL HEALTH - GAIT / STATION
Normal gait / station

## 2018-04-02 NOTE — PROGRESS NOTE BEHAVIORAL HEALTH - ESTIMATED INTELLIGENCE
Average

## 2018-04-02 NOTE — PROGRESS NOTE BEHAVIORAL HEALTH - NSBHFUPINTERVALHXFT_PSY_A_CORE
Case was discussed with treatment team and chart was reviewed. Patient was compliant with all meds. As per nursing report, her affect has been brighter. As per social work, she will be set up with Meals on Wheels and community programs in order to address her social isolation. Patient reports good mood, and no problems with sleep and appetite. She states that her PO intake has significantly improved and that she gained some weight since admission. Denies any manic/hypomanic symptoms, paranoid thoughts/delusions, perceptual disturbances, SI/HI.    Medical problems: Active ROM of right hand is nearly at baseline, as per patient. She was counseled to follow up with PCP on discharge.

## 2018-04-02 NOTE — PROGRESS NOTE BEHAVIORAL HEALTH - NSBHADMITIPREASON_PSY_A_CORE
Danger to self; mental illness expected to respond to inpatient care
other reason
other reason

## 2018-04-02 NOTE — PROGRESS NOTE BEHAVIORAL HEALTH - NS ED [facility] MED ROS GENITOURINARY
No complaints

## 2018-04-02 NOTE — PROGRESS NOTE BEHAVIORAL HEALTH - NSBHFUPMEDSE_PSY_A_CORE
None known

## 2018-04-02 NOTE — PROGRESS NOTE BEHAVIORAL HEALTH - MODIFICATIONS
see below.
continue Quetiapine 250mg and Duloxetine 60mg for depression; d/c with social supports
see below.
see below.  Patient not complaining about pain and may have responded to addition of Cymbalta;  for d/c in am.  Tramadol use should be limited .
see below.

## 2018-04-02 NOTE — PROGRESS NOTE BEHAVIORAL HEALTH - NSBHADMITCOORDCAREOTHER_PSY_A_CORE FT
creative arts and psychology staff
psychology and creative arts therapy staff
creative arts and psychology staff

## 2018-04-02 NOTE — PROGRESS NOTE BEHAVIORAL HEALTH - CASE SUMMARY
66 yo single  female domiciled alone since her mother passed away in 12/15, with long history of depression and anxiety, several past psychiatric admissions, most recent one at [facility] in 2017, prior admission to [facility] in  now presents stating she has been feeling progressively more depressed since January. Pt states that she stopped eating about two weeks ago, hoping she would die. She has been having difficulty getting out of bed going to the bathroom. "I know things are getting bad when I have to negotiate with myself if I should get up to go to the bathroom". Pt is unable to identify reasons for living. She states she took care of her mother (who  at [facility] at the age of 95) for twenty years which isolated her. She states she is "alone" and does not have any friends or family. Pt reports poor sleep, poor energy level, amotivation, worthlessness. She states she wants to be in a "safe place". There is no evidence of manic or psychotic symptoms. Pt however is somewhat oddly related and appears severely underweight.  Pt identifies going through her mother's belongings as a triggering event. She also identified her social isolation to be a contributor to her depression.  Pt stated that she had one intake appt at the [facility] after being discharged from [facility]. She however has not met with her regular treatment team as of now.    ;;3/19: continues preoccupied with Tramadol dosing stating other patients get higher doses than her.  Aware of modification of Neurontin and Seroquel dosing.  Consider raising Tramadol to max 200mg a day but monitor for sedation given pts age.  Monitor CMP.   Patient evasive about questions regarding mood and anxiety and s/h (none reported) referring back to subject of Tramadol almost at an obsessional level.  May need to consider aftercare management ; monitor for acute care issues.  Continue therapy efforts related to recent stressors.   Patient appears to be eating well.
66 yo single  female domiciled alone since her mother passed away in 12/15, with long history of depression and anxiety, several past psychiatric admissions, most recent one at [facility] in 2017, prior admission to [facility] in  now presents stating she has been feeling progressively more depressed since January. Pt states that she stopped eating about two weeks ago, hoping she would die. She has been having difficulty getting out of bed going to the bathroom. "I know things are getting bad when I have to negotiate with myself if I should get up to go to the bathroom". Pt is unable to identify reasons for living. She states she took care of her mother (who  at [facility] at the age of 95) for twenty years which isolated her. She states she is "alone" and does not have any friends or family. Pt reports poor sleep, poor energy level, amotivation, worthlessness. She states she wants to be in a "safe place". There is no evidence of manic or psychotic symptoms. Pt however is somewhat oddly related and appears severely underweight.  Pt identifies going through her mother's belongings as a triggering event. She also identified her social isolation to be a contributor to her depression.  Pt stated that she had one intake appt at the [facility] after being discharged from [facility]. She however has not met with her regular treatment team as of now.    ;;3/22: much calmer today; had been worried about storm yesterday; reiterated issues related to using Tramadol responsibly when taking Seroquel especially.  sleep ok.  pain manageable; appetite ok.  good eye contact; alert; no tremor; pt to follow up recommendations re R Hand extension.  Stable sufficient for d/c with community support.
66 yo single  female domiciled alone since her mother passed away in 12/15, with long history of depression and anxiety, several past psychiatric admissions, most recent one at [facility] in 2017, prior admission to [facility] in  now presents stating she has been feeling progressively more depressed since January. Pt states that she stopped eating about two weeks ago, hoping she would die. She has been having difficulty getting out of bed going to the bathroom. "I know things are getting bad when I have to negotiate with myself if I should get up to go to the bathroom". Pt is unable to identify reasons for living. She states she took care of her mother (who  at [facility] at the age of 95) for twenty years which isolated her. She states she is "alone" and does not have any friends or family. Pt reports poor sleep, poor energy level, amotivation, worthlessness. She states she wants to be in a "safe place". There is no evidence of manic or psychotic symptoms. Pt however is somewhat oddly related and appears severely underweight.  Pt identifies going through her mother's belongings as a triggering event. She also identified her social isolation to be a contributor to her depression.  Pt stated that she had one intake appt at the [facility] after being discharged from [facility]. She however has not met with her regular treatment team as of now.    ;;3/23: at time of discharge yesterday endorsed passive SI and willing to taper off of Tramadol as a complicating factor for mood and take increasing doses of Neurontin and a trial of Cymbalta.  States that she had been on Cymbalta before;  no mention of any adverse reaction; discussed possibility of activation , nausea, and elevated bp.  Would start dose at 30mg a day and raise to 60mg with goal of possibly 90mg a day;  monitor mood and pain.  Taper down on Tramadol and raise Neurontin to 200mg 13h ; 300mg 17h and 400mg at 21h for pain and anxiety.  Assess response.  Discussed possibility of ECT if further treatment efforts are unsuccessful and lowering SI.   Patient is alert and a little guarded; speech is soft; no avh ; no peculiarities of thought.  Somewhat anxious thought congruent.  ij: fair to poor; accepts tx and acknowledge sxs.  Does reflect on losing her mother and emptiness that ensued.
68 yo single  female domiciled alone since her mother passed away in 12/15, with long history of depression and anxiety, several past psychiatric admissions, most recent one at [facility] in 2017, prior admission to [facility] in  now presents stating she has been feeling progressively more depressed since January. Pt states that she stopped eating about two weeks ago, hoping she would die. She has been having difficulty getting out of bed going to the bathroom. "I know things are getting bad when I have to negotiate with myself if I should get up to go to the bathroom". Pt is unable to identify reasons for living. She states she took care of her mother (who  at [facility] at the age of 95) for twenty years which isolated her. She states she is "alone" and does not have any friends or family. Pt reports poor sleep, poor energy level, amotivation, worthlessness. She states she wants to be in a "safe place". There is no evidence of manic or psychotic symptoms. Pt however is somewhat oddly related and appears severely underweight.  Pt identifies going through her mother's belongings as a triggering event. She also identified her social isolation to be a contributor to her depression.  Pt stated that she had one intake appt at the [facility] after being discharged from [facility]. She however has not met with her regular treatment team as of now.    ;;3/28: patient focused on going to social groups and treatment after discharge;  reports only mild anxiety and no depressed mood;  made comments to the writer that she would consider ECT if she relapsed encouraged by the progress of other patients.  Good ADLs;  good eye contact; no s/h i/i/p more optimistic; coherent thinking thought congruent.  ij:improving some meaningful reflection on sxs and tx.  Future oriented.
68 yo single  female domiciled alone since her mother passed away in 12/15, with long history of depression and anxiety, several past psychiatric admissions, most recent one at [facility] in 2017, prior admission to [facility] in  now presents stating she has been feeling progressively more depressed since January. Pt states that she stopped eating about two weeks ago, hoping she would die. She has been having difficulty getting out of bed going to the bathroom. "I know things are getting bad when I have to negotiate with myself if I should get up to go to the bathroom". Pt is unable to identify reasons for living. She states she took care of her mother (who  at [facility] at the age of 95) for twenty years which isolated her. She states she is "alone" and does not have any friends or family. Pt reports poor sleep, poor energy level, amotivation, worthlessness. She states she wants to be in a "safe place". There is no evidence of manic or psychotic symptoms. Pt however is somewhat oddly related and appears severely underweight.  Pt identifies going through her mother's belongings as a triggering event. She also identified her social isolation to be a contributor to her depression.  Pt stated that she had one intake appt at the [facility] after being discharged from [facility]. She however has not met with her regular treatment team as of now.    ;;3/23: at time of discharge yesterday endorsed passive SI and willing to taper off of Tramadol as a complicating factor for mood and take increasing doses of Neurontin and a trial of Cymbalta.  States that she had been on Cymbalta before;  no mention of any adverse reaction; discussed possibility of activation , nausea, and elevated bp.  Would start dose at 30mg a day and raise to 60mg with goal of possibly 90mg a day;  monitor mood and pain.  Taper down on Tramadol and raise Neurontin to 200mg 13h ; 300mg 17h and 400mg at 21h for pain and anxiety.  Assess response.  Had in past discussed possibility of ECT if further treatment efforts are unsuccessful with lowering SI. current concerns are on Tramadol dependence/use/effect on mood  Patient is alert neat and clean;  a little guarded; speech is soft; no avh ; no peculiarities of thought.  Somewhat less anxious ; affect  thought congruent.  ij: fair to poor; accepts tx and acknowledge sxs.  Patient noted that she did not receive Cymbalta over week end and has been asking for Tramadol again;  resume titration of Cymbala and tapering of Tramadol and consider substitutes e.g.  Lidoderm patch or Capsacein . etc.
see above hx  ;;4/2: accepting d/c on current medication; good adls; sleep appetite ok; no spontaneous pain complaints; good eye contact; oriented; alert; no tremor; cognition intact; no avh;  mood slightly sad and slightly constricted but does smile at times appropriately.  Future oriented.  No s/h i/i/p.
68 yo single  female domiciled alone since her mother passed away in 12/15, with long history of depression and anxiety, several past psychiatric admissions, most recent one at [facility] in 2017, prior admission to [facility] in  now presents stating she has been feeling progressively more depressed since January. Pt states that she stopped eating about two weeks ago, hoping she would die. She has been having difficulty getting out of bed going to the bathroom. "I know things are getting bad when I have to negotiate with myself if I should get up to go to the bathroom". Pt is unable to identify reasons for living. She states she took care of her mother (who  at [facility] at the age of 95) for twenty years which isolated her. She states she is "alone" and does not have any friends or family. Pt reports poor sleep, poor energy level, amotivation, worthlessness. She states she wants to be in a "safe place". There is no evidence of manic or psychotic symptoms. Pt however is somewhat oddly related and appears severely underweight.  Pt identifies going through her mother's belongings as a triggering event. She also identified her social isolation to be a contributor to her depression.  Pt stated that she had one intake appt at the [facility] after being discharged from [facility]. She however has not met with her regular treatment team as of now.    ;;3/21: continues to be ambivalent about leaving the hospital ;  no pain complaints; eating well; mood is good; visible and ADLs are good; no evidence of confusion;  becomes angry with writer at any mention of leaving despite calmness in first encounter in am.   However did appear open to idea of support when she goes home .  Because of weather challenges (Nor'[place]aster today) discharge has been delayed.   To review with team plans for f/u with d/c planned for am.  No SI or HI or AVH.
68 yo single  female domiciled alone since her mother passed away in 12/15, with long history of depression and anxiety, several past psychiatric admissions, most recent one at [facility] in 2017, prior admission to [facility] in  now presents stating she has been feeling progressively more depressed since January. Pt states that she stopped eating about two weeks ago, hoping she would die. She has been having difficulty getting out of bed going to the bathroom. "I know things are getting bad when I have to negotiate with myself if I should get up to go to the bathroom". Pt is unable to identify reasons for living. She states she took care of her mother (who  at [facility] at the age of 95) for twenty years which isolated her. She states she is "alone" and does not have any friends or family. Pt reports poor sleep, poor energy level, amotivation, worthlessness. She states she wants to be in a "safe place". There is no evidence of manic or psychotic symptoms. Pt however is somewhat oddly related and appears severely underweight.  Pt identifies going through her mother's belongings as a triggering event. She also identified her social isolation to be a contributor to her depression.  Pt stated that she had one intake appt at the [facility] after being discharged from [facility]. She however has not met with her regular treatment team as of now.    ;;3/16: continues to demonstrate improved affect and obsession with Tramidol.  Wants all Seroquel at night and lowering of Neurontin.  Suggesting nighttimg Seroquel only and increasing doses of neurontin 100mg am 200mg midday and 300mg at night for pain.  Patient is in agreement with plan.  No SI or AVH;  however preoccaption with Tranmadol makes discussion difficult.
CLINICAL CONCERN:  Falls risk; tachycardia :  "I stopped eating and taking my medications"   66 yo single  female domiciled alone since her mother passed away in 12/15, with long history of depression and anxiety, several past psychiatric admissions, most recent one at [facility] in 2017, prior admission to [facility] in  now presents stating she has been feeling progressively more depressed since January. Pt states that she stopped eating about two weeks ago, hoping she would die. She has been having difficulty getting out of bed going to the bathroom. "I know things are getting bad when I have to negotiate with myself if I should get up to go to the bathroom". Pt is unable to identify reasons for living. She states she took care of her mother (who  at [facility] at the age of 95) for twenty years which isolated her. She states she is "alone" and does not have any friends or family. Pt reports poor sleep, poor energy level, amotivation, worthlessness. She states she wants to be in a "safe place". There is no evidence of manic or psychotic symptoms. Pt however is somewhat oddly related and appears severely underweight.  Pt identifies going through her mother's belongings as a triggering event. She also identifies her social isolation to be a contributor to her depression.  Pt states that she had one intake appt at the [facility] after being discharged from [facility]. She however has not met with her regular treatment team as of now.  To be restarted on Seroquel which helped her in the past.  ;;3/14: alert; oriented; continues depressed;  cxr shows hyperinflation of COPD but no c/o occasional monitoring of pulsox emory at night recommended.  ij: fair  .  PT consult pending.  Patient refuses use of walker at this time stating she doesn't need it.  Gait does appear wnl grossly.
CLINICAL CONCERN:  Falls risk; tachycardia :  "I stopped eating and taking my medications"   68 yo single  female domiciled alone since her mother passed away in 12/15, with long history of depression and anxiety, several past psychiatric admissions, most recent one at [facility] in 2017, prior admission to [facility] in  now presents stating she has been feeling progressively more depressed since January. Pt states that she stopped eating about two weeks ago, hoping she would die. She has been having difficulty getting out of bed going to the bathroom. "I know things are getting bad when I have to negotiate with myself if I should get up to go to the bathroom". Pt is unable to identify reasons for living. She states she took care of her mother (who  at [facility] at the age of 95) for twenty years which isolated her. She states she is "alone" and does not have any friends or family. Pt reports poor sleep, poor energy level, amotivation, worthlessness. She states she wants to be in a "safe place". There is no evidence of manic or psychotic symptoms. Pt however is somewhat oddly related and appears severely underweight.  Pt identifies going through her mother's belongings as a triggering event. She also identifies her social isolation to be a contributor to her depression.  Pt states that she had one intake appt at the [facility] after being discharged from [facility]. She however has not met with her regular treatment team as of now.  To be restarted on Seroquel which helped her in the past.  ;;3/15: alert; oriented; continues depressed;  cxr shows hyperinflation of COPD but no c/o occasional monitoring of pulsox emory at night recommended.  ij: fair  .  focused on Tramadol in the morning "That is why I have trouble sleeping "  somewhat convoluted and confusing.

## 2018-04-03 ENCOUNTER — APPOINTMENT (OUTPATIENT)
Dept: PSYCHIATRY | Facility: CLINIC | Age: 68
End: 2018-04-03
Payer: MEDICARE

## 2018-04-03 PROCEDURE — 90792 PSYCH DIAG EVAL W/MED SRVCS: CPT

## 2018-04-04 DIAGNOSIS — R63.0 ANOREXIA: ICD-10-CM

## 2018-04-04 DIAGNOSIS — E87.2 ACIDOSIS: ICD-10-CM

## 2018-04-04 DIAGNOSIS — M19.90 UNSPECIFIED OSTEOARTHRITIS, UNSPECIFIED SITE: ICD-10-CM

## 2018-04-04 DIAGNOSIS — F41.9 ANXIETY DISORDER, UNSPECIFIED: ICD-10-CM

## 2018-04-04 DIAGNOSIS — F33.9 MAJOR DEPRESSIVE DISORDER, RECURRENT, UNSPECIFIED: ICD-10-CM

## 2018-04-04 DIAGNOSIS — J43.9 EMPHYSEMA, UNSPECIFIED: ICD-10-CM

## 2018-04-04 DIAGNOSIS — E78.5 HYPERLIPIDEMIA, UNSPECIFIED: ICD-10-CM

## 2018-04-04 DIAGNOSIS — R00.0 TACHYCARDIA, UNSPECIFIED: ICD-10-CM

## 2018-04-04 DIAGNOSIS — I10 ESSENTIAL (PRIMARY) HYPERTENSION: ICD-10-CM

## 2018-04-04 DIAGNOSIS — R45.851 SUICIDAL IDEATIONS: ICD-10-CM

## 2018-04-04 DIAGNOSIS — G89.29 OTHER CHRONIC PAIN: ICD-10-CM

## 2018-04-04 DIAGNOSIS — Z87.891 PERSONAL HISTORY OF NICOTINE DEPENDENCE: ICD-10-CM

## 2018-04-04 DIAGNOSIS — M21.331 WRIST DROP, RIGHT WRIST: ICD-10-CM

## 2018-04-04 DIAGNOSIS — E87.6 HYPOKALEMIA: ICD-10-CM

## 2018-04-04 DIAGNOSIS — M54.9 DORSALGIA, UNSPECIFIED: ICD-10-CM

## 2018-04-04 DIAGNOSIS — M41.9 SCOLIOSIS, UNSPECIFIED: ICD-10-CM

## 2018-04-06 ENCOUNTER — APPOINTMENT (OUTPATIENT)
Dept: PSYCHIATRY | Facility: CLINIC | Age: 68
End: 2018-04-06
Payer: MEDICARE

## 2018-04-06 PROCEDURE — 99215 OFFICE O/P EST HI 40 MIN: CPT | Mod: 25

## 2018-04-06 PROCEDURE — 90853 GROUP PSYCHOTHERAPY: CPT

## 2018-04-09 ENCOUNTER — APPOINTMENT (OUTPATIENT)
Dept: PSYCHIATRY | Facility: CLINIC | Age: 68
End: 2018-04-09
Payer: MEDICARE

## 2018-04-09 PROCEDURE — 90853 GROUP PSYCHOTHERAPY: CPT

## 2018-04-13 ENCOUNTER — APPOINTMENT (OUTPATIENT)
Dept: PSYCHIATRY | Facility: CLINIC | Age: 68
End: 2018-04-13
Payer: MEDICARE

## 2018-04-13 PROCEDURE — 90853 GROUP PSYCHOTHERAPY: CPT

## 2018-04-16 ENCOUNTER — APPOINTMENT (OUTPATIENT)
Dept: PSYCHIATRY | Facility: CLINIC | Age: 68
End: 2018-04-16

## 2018-04-19 ENCOUNTER — APPOINTMENT (OUTPATIENT)
Dept: PSYCHIATRY | Facility: CLINIC | Age: 68
End: 2018-04-19
Payer: MEDICARE

## 2018-04-19 PROCEDURE — 99215 OFFICE O/P EST HI 40 MIN: CPT

## 2018-04-20 ENCOUNTER — APPOINTMENT (OUTPATIENT)
Dept: PSYCHIATRY | Facility: CLINIC | Age: 68
End: 2018-04-20

## 2018-04-23 ENCOUNTER — APPOINTMENT (OUTPATIENT)
Dept: PSYCHIATRY | Facility: CLINIC | Age: 68
End: 2018-04-23
Payer: MEDICARE

## 2018-04-23 ENCOUNTER — APPOINTMENT (OUTPATIENT)
Dept: PSYCHIATRY | Facility: CLINIC | Age: 68
End: 2018-04-23

## 2018-04-23 PROCEDURE — 90853 GROUP PSYCHOTHERAPY: CPT

## 2018-04-27 ENCOUNTER — APPOINTMENT (OUTPATIENT)
Dept: PSYCHIATRY | Facility: CLINIC | Age: 68
End: 2018-04-27
Payer: MEDICARE

## 2018-04-27 PROCEDURE — 90853 GROUP PSYCHOTHERAPY: CPT

## 2018-04-27 PROCEDURE — 99215 OFFICE O/P EST HI 40 MIN: CPT | Mod: 25

## 2018-04-30 ENCOUNTER — APPOINTMENT (OUTPATIENT)
Dept: PSYCHIATRY | Facility: CLINIC | Age: 68
End: 2018-04-30

## 2018-05-04 ENCOUNTER — APPOINTMENT (OUTPATIENT)
Dept: PSYCHIATRY | Facility: CLINIC | Age: 68
End: 2018-05-04

## 2018-05-07 ENCOUNTER — APPOINTMENT (OUTPATIENT)
Dept: PSYCHIATRY | Facility: CLINIC | Age: 68
End: 2018-05-07

## 2018-05-11 ENCOUNTER — APPOINTMENT (OUTPATIENT)
Dept: PSYCHIATRY | Facility: CLINIC | Age: 68
End: 2018-05-11
Payer: MEDICAID

## 2018-05-11 PROCEDURE — 90853 GROUP PSYCHOTHERAPY: CPT

## 2018-05-14 ENCOUNTER — APPOINTMENT (OUTPATIENT)
Dept: PSYCHIATRY | Facility: CLINIC | Age: 68
End: 2018-05-14
Payer: MEDICARE

## 2018-05-14 ENCOUNTER — APPOINTMENT (OUTPATIENT)
Dept: PSYCHIATRY | Facility: CLINIC | Age: 68
End: 2018-05-14

## 2018-05-14 PROCEDURE — 90853 GROUP PSYCHOTHERAPY: CPT

## 2018-05-18 ENCOUNTER — APPOINTMENT (OUTPATIENT)
Dept: PSYCHIATRY | Facility: CLINIC | Age: 68
End: 2018-05-18

## 2018-05-21 ENCOUNTER — APPOINTMENT (OUTPATIENT)
Dept: PSYCHIATRY | Facility: CLINIC | Age: 68
End: 2018-05-21
Payer: MEDICARE

## 2018-05-21 PROCEDURE — 90853 GROUP PSYCHOTHERAPY: CPT

## 2018-05-25 ENCOUNTER — APPOINTMENT (OUTPATIENT)
Dept: PSYCHIATRY | Facility: CLINIC | Age: 68
End: 2018-05-25

## 2018-06-01 ENCOUNTER — APPOINTMENT (OUTPATIENT)
Dept: PSYCHIATRY | Facility: CLINIC | Age: 68
End: 2018-06-01

## 2018-06-04 ENCOUNTER — APPOINTMENT (OUTPATIENT)
Dept: PSYCHIATRY | Facility: CLINIC | Age: 68
End: 2018-06-04

## 2018-06-08 ENCOUNTER — APPOINTMENT (OUTPATIENT)
Dept: PSYCHIATRY | Facility: CLINIC | Age: 68
End: 2018-06-08

## 2018-06-11 ENCOUNTER — APPOINTMENT (OUTPATIENT)
Age: 68
End: 2018-06-11

## 2018-06-11 ENCOUNTER — APPOINTMENT (OUTPATIENT)
Dept: PSYCHIATRY | Facility: CLINIC | Age: 68
End: 2018-06-11
Payer: MEDICARE

## 2018-06-11 PROCEDURE — 90853 GROUP PSYCHOTHERAPY: CPT

## 2018-06-15 ENCOUNTER — APPOINTMENT (OUTPATIENT)
Dept: PSYCHIATRY | Facility: CLINIC | Age: 68
End: 2018-06-15
Payer: MEDICARE

## 2018-06-15 PROCEDURE — 90853 GROUP PSYCHOTHERAPY: CPT

## 2018-06-18 ENCOUNTER — APPOINTMENT (OUTPATIENT)
Dept: PSYCHIATRY | Facility: CLINIC | Age: 68
End: 2018-06-18
Payer: MEDICARE

## 2018-06-18 PROCEDURE — 99215 OFFICE O/P EST HI 40 MIN: CPT | Mod: 25

## 2018-06-18 PROCEDURE — 90853 GROUP PSYCHOTHERAPY: CPT

## 2018-06-22 ENCOUNTER — APPOINTMENT (OUTPATIENT)
Dept: PSYCHIATRY | Facility: CLINIC | Age: 68
End: 2018-06-22
Payer: MEDICARE

## 2018-06-22 PROCEDURE — 90853 GROUP PSYCHOTHERAPY: CPT

## 2018-06-22 PROCEDURE — 99214 OFFICE O/P EST MOD 30 MIN: CPT | Mod: 25

## 2018-06-25 ENCOUNTER — APPOINTMENT (OUTPATIENT)
Dept: PSYCHIATRY | Facility: CLINIC | Age: 68
End: 2018-06-25
Payer: MEDICARE

## 2018-06-25 PROCEDURE — 99214 OFFICE O/P EST MOD 30 MIN: CPT | Mod: 25

## 2018-06-25 PROCEDURE — 90853 GROUP PSYCHOTHERAPY: CPT

## 2018-06-29 ENCOUNTER — APPOINTMENT (OUTPATIENT)
Dept: PSYCHIATRY | Facility: CLINIC | Age: 68
End: 2018-06-29
Payer: MEDICARE

## 2018-06-29 PROCEDURE — 90853 GROUP PSYCHOTHERAPY: CPT

## 2018-07-02 ENCOUNTER — APPOINTMENT (OUTPATIENT)
Dept: PSYCHIATRY | Facility: CLINIC | Age: 68
End: 2018-07-02

## 2018-07-06 ENCOUNTER — APPOINTMENT (OUTPATIENT)
Dept: PSYCHIATRY | Facility: CLINIC | Age: 68
End: 2018-07-06
Payer: MEDICARE

## 2018-07-06 ENCOUNTER — APPOINTMENT (OUTPATIENT)
Dept: PSYCHIATRY | Facility: CLINIC | Age: 68
End: 2018-07-06

## 2018-07-06 PROCEDURE — 99214 OFFICE O/P EST MOD 30 MIN: CPT

## 2018-07-09 ENCOUNTER — APPOINTMENT (OUTPATIENT)
Dept: PSYCHIATRY | Facility: CLINIC | Age: 68
End: 2018-07-09
Payer: MEDICARE

## 2018-07-09 PROCEDURE — 90853 GROUP PSYCHOTHERAPY: CPT

## 2018-07-13 ENCOUNTER — APPOINTMENT (OUTPATIENT)
Dept: PSYCHIATRY | Facility: CLINIC | Age: 68
End: 2018-07-13
Payer: MEDICARE

## 2018-07-13 PROCEDURE — 90853 GROUP PSYCHOTHERAPY: CPT

## 2018-07-16 ENCOUNTER — APPOINTMENT (OUTPATIENT)
Dept: PSYCHIATRY | Facility: CLINIC | Age: 68
End: 2018-07-16
Payer: MEDICARE

## 2018-07-16 PROCEDURE — 90853 GROUP PSYCHOTHERAPY: CPT

## 2018-07-20 ENCOUNTER — APPOINTMENT (OUTPATIENT)
Dept: PSYCHIATRY | Facility: CLINIC | Age: 68
End: 2018-07-20
Payer: MEDICARE

## 2018-07-20 PROCEDURE — 99214 OFFICE O/P EST MOD 30 MIN: CPT | Mod: 25

## 2018-07-20 PROCEDURE — 90853 GROUP PSYCHOTHERAPY: CPT

## 2018-07-23 ENCOUNTER — APPOINTMENT (OUTPATIENT)
Dept: PSYCHIATRY | Facility: CLINIC | Age: 68
End: 2018-07-23

## 2018-07-27 ENCOUNTER — APPOINTMENT (OUTPATIENT)
Dept: PSYCHIATRY | Facility: CLINIC | Age: 68
End: 2018-07-27

## 2018-07-30 ENCOUNTER — APPOINTMENT (OUTPATIENT)
Dept: PSYCHIATRY | Facility: CLINIC | Age: 68
End: 2018-07-30

## 2018-08-03 ENCOUNTER — APPOINTMENT (OUTPATIENT)
Dept: PSYCHIATRY | Facility: CLINIC | Age: 68
End: 2018-08-03
Payer: MEDICARE

## 2018-08-03 PROCEDURE — 99214 OFFICE O/P EST MOD 30 MIN: CPT | Mod: 25

## 2018-08-03 PROCEDURE — 90853 GROUP PSYCHOTHERAPY: CPT

## 2018-08-06 ENCOUNTER — APPOINTMENT (OUTPATIENT)
Dept: PSYCHIATRY | Facility: CLINIC | Age: 68
End: 2018-08-06
Payer: MEDICARE

## 2018-08-06 PROCEDURE — 90853 GROUP PSYCHOTHERAPY: CPT

## 2018-08-10 ENCOUNTER — APPOINTMENT (OUTPATIENT)
Dept: PSYCHIATRY | Facility: CLINIC | Age: 68
End: 2018-08-10

## 2018-08-13 ENCOUNTER — APPOINTMENT (OUTPATIENT)
Dept: PSYCHIATRY | Facility: CLINIC | Age: 68
End: 2018-08-13

## 2018-08-17 ENCOUNTER — APPOINTMENT (OUTPATIENT)
Dept: PSYCHIATRY | Facility: CLINIC | Age: 68
End: 2018-08-17

## 2018-08-20 ENCOUNTER — APPOINTMENT (OUTPATIENT)
Dept: PSYCHIATRY | Facility: CLINIC | Age: 68
End: 2018-08-20

## 2018-08-23 ENCOUNTER — APPOINTMENT (OUTPATIENT)
Dept: ORTHOPEDIC SURGERY | Facility: CLINIC | Age: 68
End: 2018-08-23

## 2018-08-24 ENCOUNTER — APPOINTMENT (OUTPATIENT)
Dept: PSYCHIATRY | Facility: CLINIC | Age: 68
End: 2018-08-24

## 2018-08-27 ENCOUNTER — APPOINTMENT (OUTPATIENT)
Dept: PSYCHIATRY | Facility: CLINIC | Age: 68
End: 2018-08-27

## 2018-08-31 ENCOUNTER — APPOINTMENT (OUTPATIENT)
Dept: PSYCHIATRY | Facility: CLINIC | Age: 68
End: 2018-08-31
Payer: MEDICARE

## 2018-08-31 PROCEDURE — 99214 OFFICE O/P EST MOD 30 MIN: CPT

## 2018-09-07 ENCOUNTER — APPOINTMENT (OUTPATIENT)
Dept: PSYCHIATRY | Facility: CLINIC | Age: 68
End: 2018-09-07

## 2018-09-14 ENCOUNTER — APPOINTMENT (OUTPATIENT)
Dept: PSYCHIATRY | Facility: CLINIC | Age: 68
End: 2018-09-14
Payer: MEDICARE

## 2018-09-14 PROCEDURE — 99214 OFFICE O/P EST MOD 30 MIN: CPT | Mod: 25

## 2018-09-14 PROCEDURE — 90853 GROUP PSYCHOTHERAPY: CPT

## 2018-09-21 ENCOUNTER — APPOINTMENT (OUTPATIENT)
Dept: PSYCHIATRY | Facility: CLINIC | Age: 68
End: 2018-09-21

## 2018-09-28 ENCOUNTER — APPOINTMENT (OUTPATIENT)
Dept: PSYCHIATRY | Facility: CLINIC | Age: 68
End: 2018-09-28

## 2018-10-05 ENCOUNTER — APPOINTMENT (OUTPATIENT)
Dept: PSYCHIATRY | Facility: CLINIC | Age: 68
End: 2018-10-05

## 2018-10-12 ENCOUNTER — APPOINTMENT (OUTPATIENT)
Dept: PSYCHIATRY | Facility: CLINIC | Age: 68
End: 2018-10-12

## 2018-10-19 ENCOUNTER — APPOINTMENT (OUTPATIENT)
Dept: PSYCHIATRY | Facility: CLINIC | Age: 68
End: 2018-10-19
Payer: MEDICARE

## 2018-10-19 PROCEDURE — 90853 GROUP PSYCHOTHERAPY: CPT

## 2018-10-22 ENCOUNTER — APPOINTMENT (OUTPATIENT)
Dept: PSYCHIATRY | Facility: CLINIC | Age: 68
End: 2018-10-22
Payer: MEDICARE

## 2018-10-22 PROCEDURE — 90853 GROUP PSYCHOTHERAPY: CPT

## 2018-10-26 ENCOUNTER — APPOINTMENT (OUTPATIENT)
Dept: PSYCHIATRY | Facility: CLINIC | Age: 68
End: 2018-10-26
Payer: MEDICARE

## 2018-10-26 PROCEDURE — 90853 GROUP PSYCHOTHERAPY: CPT

## 2018-10-29 ENCOUNTER — APPOINTMENT (OUTPATIENT)
Dept: PSYCHIATRY | Facility: CLINIC | Age: 68
End: 2018-10-29
Payer: MEDICARE

## 2018-10-29 PROCEDURE — 90853 GROUP PSYCHOTHERAPY: CPT

## 2018-10-29 PROCEDURE — 99214 OFFICE O/P EST MOD 30 MIN: CPT | Mod: 25

## 2018-11-02 ENCOUNTER — APPOINTMENT (OUTPATIENT)
Dept: PSYCHIATRY | Facility: CLINIC | Age: 68
End: 2018-11-02

## 2018-11-02 ENCOUNTER — APPOINTMENT (OUTPATIENT)
Dept: PSYCHIATRY | Facility: CLINIC | Age: 68
End: 2018-11-02
Payer: MEDICARE

## 2018-11-02 PROCEDURE — 90853 GROUP PSYCHOTHERAPY: CPT

## 2018-11-05 ENCOUNTER — APPOINTMENT (OUTPATIENT)
Dept: PSYCHIATRY | Facility: CLINIC | Age: 68
End: 2018-11-05

## 2018-11-09 ENCOUNTER — APPOINTMENT (OUTPATIENT)
Dept: PSYCHIATRY | Facility: CLINIC | Age: 68
End: 2018-11-09
Payer: MEDICARE

## 2018-11-09 PROCEDURE — 90853 GROUP PSYCHOTHERAPY: CPT

## 2018-11-12 ENCOUNTER — APPOINTMENT (OUTPATIENT)
Dept: PSYCHIATRY | Facility: CLINIC | Age: 68
End: 2018-11-12
Payer: MEDICARE

## 2018-11-12 PROCEDURE — 90853 GROUP PSYCHOTHERAPY: CPT

## 2018-11-12 PROCEDURE — 99214 OFFICE O/P EST MOD 30 MIN: CPT | Mod: 25

## 2018-11-16 ENCOUNTER — APPOINTMENT (OUTPATIENT)
Dept: PSYCHIATRY | Facility: CLINIC | Age: 68
End: 2018-11-16

## 2018-11-19 ENCOUNTER — APPOINTMENT (OUTPATIENT)
Dept: PSYCHIATRY | Facility: CLINIC | Age: 68
End: 2018-11-19

## 2018-11-26 ENCOUNTER — APPOINTMENT (OUTPATIENT)
Dept: PSYCHIATRY | Facility: CLINIC | Age: 68
End: 2018-11-26

## 2018-11-30 ENCOUNTER — APPOINTMENT (OUTPATIENT)
Dept: PSYCHIATRY | Facility: CLINIC | Age: 68
End: 2018-11-30

## 2018-12-03 ENCOUNTER — APPOINTMENT (OUTPATIENT)
Dept: PSYCHIATRY | Facility: CLINIC | Age: 68
End: 2018-12-03

## 2018-12-07 ENCOUNTER — APPOINTMENT (OUTPATIENT)
Dept: PSYCHIATRY | Facility: CLINIC | Age: 68
End: 2018-12-07

## 2018-12-10 ENCOUNTER — APPOINTMENT (OUTPATIENT)
Dept: PSYCHIATRY | Facility: CLINIC | Age: 68
End: 2018-12-10

## 2018-12-14 ENCOUNTER — APPOINTMENT (OUTPATIENT)
Dept: PSYCHIATRY | Facility: CLINIC | Age: 68
End: 2018-12-14

## 2018-12-17 ENCOUNTER — APPOINTMENT (OUTPATIENT)
Dept: PSYCHIATRY | Facility: CLINIC | Age: 68
End: 2018-12-17

## 2018-12-21 ENCOUNTER — APPOINTMENT (OUTPATIENT)
Dept: PSYCHIATRY | Facility: CLINIC | Age: 68
End: 2018-12-21

## 2018-12-28 ENCOUNTER — APPOINTMENT (OUTPATIENT)
Dept: PSYCHIATRY | Facility: CLINIC | Age: 68
End: 2018-12-28

## 2018-12-31 ENCOUNTER — APPOINTMENT (OUTPATIENT)
Dept: PSYCHIATRY | Facility: CLINIC | Age: 68
End: 2018-12-31

## 2019-01-04 ENCOUNTER — APPOINTMENT (OUTPATIENT)
Dept: PSYCHIATRY | Facility: CLINIC | Age: 69
End: 2019-01-04

## 2019-01-07 ENCOUNTER — APPOINTMENT (OUTPATIENT)
Dept: PSYCHIATRY | Facility: CLINIC | Age: 69
End: 2019-01-07

## 2019-01-11 ENCOUNTER — APPOINTMENT (OUTPATIENT)
Dept: PSYCHIATRY | Facility: CLINIC | Age: 69
End: 2019-01-11

## 2019-01-14 ENCOUNTER — APPOINTMENT (OUTPATIENT)
Dept: PSYCHIATRY | Facility: CLINIC | Age: 69
End: 2019-01-14

## 2019-01-18 ENCOUNTER — APPOINTMENT (OUTPATIENT)
Dept: PSYCHIATRY | Facility: CLINIC | Age: 69
End: 2019-01-18

## 2019-01-25 ENCOUNTER — APPOINTMENT (OUTPATIENT)
Dept: PSYCHIATRY | Facility: CLINIC | Age: 69
End: 2019-01-25

## 2019-01-28 ENCOUNTER — APPOINTMENT (OUTPATIENT)
Dept: PSYCHIATRY | Facility: CLINIC | Age: 69
End: 2019-01-28

## 2019-02-04 ENCOUNTER — APPOINTMENT (OUTPATIENT)
Dept: PSYCHIATRY | Facility: CLINIC | Age: 69
End: 2019-02-04

## 2019-02-11 ENCOUNTER — APPOINTMENT (OUTPATIENT)
Dept: PSYCHIATRY | Facility: CLINIC | Age: 69
End: 2019-02-11

## 2019-02-25 ENCOUNTER — APPOINTMENT (OUTPATIENT)
Dept: PSYCHIATRY | Facility: CLINIC | Age: 69
End: 2019-02-25

## 2019-03-04 ENCOUNTER — APPOINTMENT (OUTPATIENT)
Dept: PSYCHIATRY | Facility: CLINIC | Age: 69
End: 2019-03-04

## 2019-04-17 NOTE — ED BEHAVIORAL HEALTH ASSESSMENT NOTE - MEDICAL RECORD REVIEWED
TRANSFER - OUT REPORT:    Verbal report given to Trevor Cooney RN(name) on Apollo Turner  being transferred to (unit) for routine progression of care       Report consisted of patients Situation, Background, Assessment and   Recommendations(SBAR). Information from the following report(s) SBAR, ED Summary and MAR was reviewed with the receiving nurse. Lines:   Peripheral IV 04/16/19 Left Hand (Active)   Site Assessment Clean, dry, & intact 4/16/2019  9:36 PM   Phlebitis Assessment 0 4/16/2019  9:36 PM   Infiltration Assessment 0 4/16/2019  9:36 PM   Dressing Status Clean, dry, & intact 4/16/2019  9:36 PM   Hub Color/Line Status Pink 4/16/2019  9:36 PM       Peripheral IV 04/16/19 Right Antecubital (Active)   Site Assessment Clean, dry, & intact 4/16/2019  9:37 PM   Phlebitis Assessment 0 4/16/2019  9:37 PM   Infiltration Assessment 0 4/16/2019  9:37 PM   Dressing Status Clean, dry, & intact 4/16/2019  9:37 PM   Hub Color/Line Status Green 4/16/2019  9:37 PM        Opportunity for questions and clarification was provided.       Patient transported with:   Registered Nurse Yes

## 2019-07-29 ENCOUNTER — APPOINTMENT (OUTPATIENT)
Dept: ORTHOPEDIC SURGERY | Facility: CLINIC | Age: 69
End: 2019-07-29
Payer: MEDICARE

## 2019-07-29 VITALS — WEIGHT: 140 LBS | HEIGHT: 65 IN | BODY MASS INDEX: 23.32 KG/M2

## 2019-07-29 DIAGNOSIS — M17.12 UNILATERAL PRIMARY OSTEOARTHRITIS, LEFT KNEE: ICD-10-CM

## 2019-07-29 PROCEDURE — 99204 OFFICE O/P NEW MOD 45 MIN: CPT | Mod: 25

## 2019-07-29 PROCEDURE — 20611 DRAIN/INJ JOINT/BURSA W/US: CPT | Mod: LT

## 2019-07-29 RX ORDER — OXYCODONE 5 MG/1
5 TABLET ORAL
Refills: 0 | Status: ACTIVE | COMMUNITY

## 2019-07-29 RX ORDER — POLYETHYLENE GLYCOL 3350 17 G/17G
17 POWDER, FOR SOLUTION ORAL
Refills: 0 | Status: ACTIVE | COMMUNITY

## 2019-07-29 RX ORDER — METOPROLOL SUCCINATE 50 MG/1
50 TABLET, EXTENDED RELEASE ORAL
Refills: 0 | Status: ACTIVE | COMMUNITY

## 2019-07-29 RX ORDER — GABAPENTIN 100 MG/1
100 CAPSULE ORAL
Refills: 0 | Status: ACTIVE | COMMUNITY

## 2019-07-29 RX ORDER — CYANOCOBALAMIN (VITAMIN B-12) 1000 MCG
1000 TABLET ORAL
Refills: 0 | Status: ACTIVE | COMMUNITY

## 2019-07-29 RX ORDER — CHLORHEXIDINE GLUCONATE 4 %
325 (65 FE) LIQUID (ML) TOPICAL
Refills: 0 | Status: ACTIVE | COMMUNITY

## 2019-07-29 RX ORDER — ALBUTEROL SULFATE 108 UG/1
108 (90 BASE) AEROSOL, METERED RESPIRATORY (INHALATION)
Refills: 0 | Status: ACTIVE | COMMUNITY

## 2019-07-29 RX ORDER — MIRTAZAPINE 30 MG/1
30 TABLET, FILM COATED ORAL
Refills: 0 | Status: ACTIVE | COMMUNITY

## 2019-07-29 RX ORDER — MEGESTROL ACETATE 40 MG/ML
40 SUSPENSION ORAL
Refills: 0 | Status: ACTIVE | COMMUNITY

## 2019-07-29 RX ORDER — ATORVASTATIN CALCIUM 10 MG/1
10 TABLET, FILM COATED ORAL
Refills: 0 | Status: ACTIVE | COMMUNITY

## 2019-07-29 RX ORDER — PANTOPRAZOLE SODIUM 40 MG/1
40 GRANULE, DELAYED RELEASE ORAL
Refills: 0 | Status: ACTIVE | COMMUNITY

## 2019-07-29 RX ORDER — CALCIUM CARBONATE 500(1250)
500 TABLET ORAL
Refills: 0 | Status: ACTIVE | COMMUNITY

## 2019-07-29 RX ORDER — CELECOXIB 200 MG/1
200 CAPSULE ORAL
Refills: 0 | Status: ACTIVE | COMMUNITY

## 2019-07-29 RX ORDER — LIDOCAINE HCL 4 %
4 LIQUID ROLL-ON (ML) TOPICAL
Refills: 0 | Status: ACTIVE | COMMUNITY

## 2019-07-29 RX ORDER — LEVOCETIRIZINE DIHYDROCHLORIDE 5 MG/1
5 TABLET ORAL
Refills: 0 | Status: ACTIVE | COMMUNITY

## 2019-07-29 RX ORDER — FLUTICASONE PROPION/SALMETEROL 250-50 MCG
250-50 BLISTER, WITH INHALATION DEVICE INHALATION
Refills: 0 | Status: ACTIVE | COMMUNITY

## 2019-07-29 RX ORDER — ASPIRIN 81 MG
81 TABLET, DELAYED RELEASE (ENTERIC COATED) ORAL
Refills: 0 | Status: ACTIVE | COMMUNITY

## 2019-07-29 RX ORDER — TRAZODONE HYDROCHLORIDE 100 MG/1
100 TABLET ORAL
Refills: 0 | Status: ACTIVE | COMMUNITY

## 2021-02-17 NOTE — ED BEHAVIORAL HEALTH ASSESSMENT NOTE - AXIS III
9S: Occupational Therapy session attempted. Patient was not available for the therapy session at this time. Pt reports that he already washed up, walking around room and is now resting in bed comfortably. Pt politely declined OT today. Cornelious Bamberger scoliosis, HTN, High hyperlipidemia COPD

## 2021-08-01 ENCOUNTER — OUTPATIENT (OUTPATIENT)
Dept: OUTPATIENT SERVICES | Facility: HOSPITAL | Age: 71
LOS: 1 days | End: 2021-08-01
Payer: MEDICARE

## 2021-08-01 DIAGNOSIS — Z98.89 OTHER SPECIFIED POSTPROCEDURAL STATES: Chronic | ICD-10-CM

## 2021-08-02 ENCOUNTER — INPATIENT (INPATIENT)
Facility: HOSPITAL | Age: 71
LOS: 6 days | Discharge: HOME | DRG: 885 | End: 2021-08-09
Attending: PSYCHIATRY & NEUROLOGY | Admitting: PSYCHIATRY & NEUROLOGY
Payer: MEDICARE

## 2021-08-02 VITALS
SYSTOLIC BLOOD PRESSURE: 168 MMHG | DIASTOLIC BLOOD PRESSURE: 90 MMHG | WEIGHT: 130.07 LBS | TEMPERATURE: 98 F | HEART RATE: 94 BPM | RESPIRATION RATE: 18 BRPM | OXYGEN SATURATION: 97 % | HEIGHT: 65 IN

## 2021-08-02 DIAGNOSIS — Z98.89 OTHER SPECIFIED POSTPROCEDURAL STATES: Chronic | ICD-10-CM

## 2021-08-02 LAB
ALBUMIN SERPL ELPH-MCNC: 4.1 G/DL — SIGNIFICANT CHANGE UP (ref 3.3–5)
ALP SERPL-CCNC: 81 U/L — SIGNIFICANT CHANGE UP (ref 40–120)
ALT FLD-CCNC: 7 U/L — LOW (ref 10–45)
AMPHET UR-MCNC: NEGATIVE — SIGNIFICANT CHANGE UP
ANION GAP SERPL CALC-SCNC: 10 MMOL/L — SIGNIFICANT CHANGE UP (ref 5–17)
APPEARANCE UR: CLEAR — SIGNIFICANT CHANGE UP
AST SERPL-CCNC: 15 U/L — SIGNIFICANT CHANGE UP (ref 10–40)
BARBITURATES UR SCN-MCNC: NEGATIVE — SIGNIFICANT CHANGE UP
BASOPHILS # BLD AUTO: 0.05 K/UL — SIGNIFICANT CHANGE UP (ref 0–0.2)
BASOPHILS NFR BLD AUTO: 1 % — SIGNIFICANT CHANGE UP (ref 0–2)
BENZODIAZ UR-MCNC: NEGATIVE — SIGNIFICANT CHANGE UP
BILIRUB SERPL-MCNC: 0.3 MG/DL — SIGNIFICANT CHANGE UP (ref 0.2–1.2)
BILIRUB UR-MCNC: NEGATIVE — SIGNIFICANT CHANGE UP
BUN SERPL-MCNC: 4 MG/DL — LOW (ref 7–23)
CALCIUM SERPL-MCNC: 9.9 MG/DL — SIGNIFICANT CHANGE UP (ref 8.4–10.5)
CHLORIDE SERPL-SCNC: 107 MMOL/L — SIGNIFICANT CHANGE UP (ref 96–108)
CO2 SERPL-SCNC: 23 MMOL/L — SIGNIFICANT CHANGE UP (ref 22–31)
COCAINE METAB.OTHER UR-MCNC: NEGATIVE — SIGNIFICANT CHANGE UP
COLOR SPEC: YELLOW — SIGNIFICANT CHANGE UP
CREAT SERPL-MCNC: 0.56 MG/DL — SIGNIFICANT CHANGE UP (ref 0.5–1.3)
DIFF PNL FLD: NEGATIVE — SIGNIFICANT CHANGE UP
EOSINOPHIL # BLD AUTO: 0.03 K/UL — SIGNIFICANT CHANGE UP (ref 0–0.5)
EOSINOPHIL NFR BLD AUTO: 0.6 % — SIGNIFICANT CHANGE UP (ref 0–6)
ETHANOL SERPL-MCNC: <10 MG/DL — SIGNIFICANT CHANGE UP (ref 0–10)
GLUCOSE SERPL-MCNC: 106 MG/DL — HIGH (ref 70–99)
GLUCOSE UR QL: NEGATIVE — SIGNIFICANT CHANGE UP
HCT VFR BLD CALC: 41.5 % — SIGNIFICANT CHANGE UP (ref 34.5–45)
HGB BLD-MCNC: 13.3 G/DL — SIGNIFICANT CHANGE UP (ref 11.5–15.5)
IMM GRANULOCYTES NFR BLD AUTO: 0.4 % — SIGNIFICANT CHANGE UP (ref 0–1.5)
KETONES UR-MCNC: NEGATIVE — SIGNIFICANT CHANGE UP
LEUKOCYTE ESTERASE UR-ACNC: NEGATIVE — SIGNIFICANT CHANGE UP
LYMPHOCYTES # BLD AUTO: 1.21 K/UL — SIGNIFICANT CHANGE UP (ref 1–3.3)
LYMPHOCYTES # BLD AUTO: 23.2 % — SIGNIFICANT CHANGE UP (ref 13–44)
MAGNESIUM SERPL-MCNC: 1.7 MG/DL — SIGNIFICANT CHANGE UP (ref 1.6–2.6)
MCHC RBC-ENTMCNC: 32 GM/DL — SIGNIFICANT CHANGE UP (ref 32–36)
MCHC RBC-ENTMCNC: 33 PG — SIGNIFICANT CHANGE UP (ref 27–34)
MCV RBC AUTO: 103 FL — HIGH (ref 80–100)
METHADONE UR-MCNC: NEGATIVE — SIGNIFICANT CHANGE UP
MONOCYTES # BLD AUTO: 0.42 K/UL — SIGNIFICANT CHANGE UP (ref 0–0.9)
MONOCYTES NFR BLD AUTO: 8.1 % — SIGNIFICANT CHANGE UP (ref 2–14)
NEUTROPHILS # BLD AUTO: 3.48 K/UL — SIGNIFICANT CHANGE UP (ref 1.8–7.4)
NEUTROPHILS NFR BLD AUTO: 66.7 % — SIGNIFICANT CHANGE UP (ref 43–77)
NITRITE UR-MCNC: NEGATIVE — SIGNIFICANT CHANGE UP
NRBC # BLD: 0 /100 WBCS — SIGNIFICANT CHANGE UP (ref 0–0)
OPIATES UR-MCNC: NEGATIVE — SIGNIFICANT CHANGE UP
PCP SPEC-MCNC: SIGNIFICANT CHANGE UP
PCP UR-MCNC: NEGATIVE — SIGNIFICANT CHANGE UP
PH UR: 7 — SIGNIFICANT CHANGE UP (ref 5–8)
PLATELET # BLD AUTO: 296 K/UL — SIGNIFICANT CHANGE UP (ref 150–400)
POTASSIUM SERPL-MCNC: 3.3 MMOL/L — LOW (ref 3.5–5.3)
POTASSIUM SERPL-SCNC: 3.3 MMOL/L — LOW (ref 3.5–5.3)
PROT SERPL-MCNC: 6.9 G/DL — SIGNIFICANT CHANGE UP (ref 6–8.3)
PROT UR-MCNC: NEGATIVE MG/DL — SIGNIFICANT CHANGE UP
RBC # BLD: 4.03 M/UL — SIGNIFICANT CHANGE UP (ref 3.8–5.2)
RBC # FLD: 15 % — HIGH (ref 10.3–14.5)
SARS-COV-2 RNA SPEC QL NAA+PROBE: NEGATIVE — SIGNIFICANT CHANGE UP
SODIUM SERPL-SCNC: 140 MMOL/L — SIGNIFICANT CHANGE UP (ref 135–145)
SP GR SPEC: 1.01 — SIGNIFICANT CHANGE UP (ref 1–1.03)
THC UR QL: NEGATIVE — SIGNIFICANT CHANGE UP
UROBILINOGEN FLD QL: 0.2 E.U./DL — SIGNIFICANT CHANGE UP
WBC # BLD: 5.21 K/UL — SIGNIFICANT CHANGE UP (ref 3.8–10.5)
WBC # FLD AUTO: 5.21 K/UL — SIGNIFICANT CHANGE UP (ref 3.8–10.5)

## 2021-08-02 PROCEDURE — 90792 PSYCH DIAG EVAL W/MED SRVCS: CPT

## 2021-08-02 PROCEDURE — 71045 X-RAY EXAM CHEST 1 VIEW: CPT | Mod: 26

## 2021-08-02 PROCEDURE — 99285 EMERGENCY DEPT VISIT HI MDM: CPT

## 2021-08-02 PROCEDURE — 93010 ELECTROCARDIOGRAM REPORT: CPT

## 2021-08-02 RX ORDER — CALCIUM CARBONATE 500(1250)
1 TABLET ORAL
Refills: 0 | Status: DISCONTINUED | OUTPATIENT
Start: 2021-08-02 | End: 2021-08-09

## 2021-08-02 RX ORDER — ALBUTEROL 90 UG/1
2 AEROSOL, METERED ORAL EVERY 6 HOURS
Refills: 0 | Status: DISCONTINUED | OUTPATIENT
Start: 2021-08-02 | End: 2021-08-09

## 2021-08-02 RX ORDER — BUDESONIDE AND FORMOTEROL FUMARATE DIHYDRATE 160; 4.5 UG/1; UG/1
2 AEROSOL RESPIRATORY (INHALATION)
Refills: 0 | Status: DISCONTINUED | OUTPATIENT
Start: 2021-08-02 | End: 2021-08-09

## 2021-08-02 RX ORDER — LORATADINE 10 MG/1
10 TABLET ORAL DAILY
Refills: 0 | Status: DISCONTINUED | OUTPATIENT
Start: 2021-08-02 | End: 2021-08-09

## 2021-08-02 RX ORDER — CELECOXIB 200 MG/1
100 CAPSULE ORAL ONCE
Refills: 0 | Status: COMPLETED | OUTPATIENT
Start: 2021-08-02 | End: 2021-08-02

## 2021-08-02 RX ORDER — PANTOPRAZOLE SODIUM 20 MG/1
40 TABLET, DELAYED RELEASE ORAL
Refills: 0 | Status: DISCONTINUED | OUTPATIENT
Start: 2021-08-02 | End: 2021-08-09

## 2021-08-02 RX ORDER — FOLIC ACID 0.8 MG
1 TABLET ORAL DAILY
Refills: 0 | Status: DISCONTINUED | OUTPATIENT
Start: 2021-08-02 | End: 2021-08-09

## 2021-08-02 RX ORDER — METOPROLOL TARTRATE 50 MG
50 TABLET ORAL DAILY
Refills: 0 | Status: DISCONTINUED | OUTPATIENT
Start: 2021-08-02 | End: 2021-08-09

## 2021-08-02 RX ORDER — TRAZODONE HCL 50 MG
50 TABLET ORAL AT BEDTIME
Refills: 0 | Status: DISCONTINUED | OUTPATIENT
Start: 2021-08-02 | End: 2021-08-09

## 2021-08-02 RX ORDER — TRAMADOL HYDROCHLORIDE 50 MG/1
50 TABLET ORAL ONCE
Refills: 0 | Status: DISCONTINUED | OUTPATIENT
Start: 2021-08-02 | End: 2021-08-02

## 2021-08-02 RX ORDER — ATORVASTATIN CALCIUM 80 MG/1
10 TABLET, FILM COATED ORAL AT BEDTIME
Refills: 0 | Status: DISCONTINUED | OUTPATIENT
Start: 2021-08-02 | End: 2021-08-09

## 2021-08-02 RX ORDER — CELECOXIB 200 MG/1
100 CAPSULE ORAL
Refills: 0 | Status: DISCONTINUED | OUTPATIENT
Start: 2021-08-03 | End: 2021-08-03

## 2021-08-02 RX ORDER — TRAMADOL HYDROCHLORIDE 50 MG/1
50 TABLET ORAL EVERY 6 HOURS
Refills: 0 | Status: DISCONTINUED | OUTPATIENT
Start: 2021-08-02 | End: 2021-08-09

## 2021-08-02 RX ORDER — QUETIAPINE FUMARATE 200 MG/1
25 TABLET, FILM COATED ORAL EVERY 6 HOURS
Refills: 0 | Status: DISCONTINUED | OUTPATIENT
Start: 2021-08-02 | End: 2021-08-09

## 2021-08-02 RX ORDER — QUETIAPINE FUMARATE 200 MG/1
150 TABLET, FILM COATED ORAL AT BEDTIME
Refills: 0 | Status: DISCONTINUED | OUTPATIENT
Start: 2021-08-02 | End: 2021-08-09

## 2021-08-02 RX ORDER — PREGABALIN 225 MG/1
1000 CAPSULE ORAL DAILY
Refills: 0 | Status: DISCONTINUED | OUTPATIENT
Start: 2021-08-02 | End: 2021-08-09

## 2021-08-02 RX ORDER — MAGNESIUM HYDROXIDE 400 MG/1
30 TABLET, CHEWABLE ORAL DAILY
Refills: 0 | Status: DISCONTINUED | OUTPATIENT
Start: 2021-08-02 | End: 2021-08-09

## 2021-08-02 RX ORDER — CHOLECALCIFEROL (VITAMIN D3) 125 MCG
400 CAPSULE ORAL
Refills: 0 | Status: DISCONTINUED | OUTPATIENT
Start: 2021-08-02 | End: 2021-08-09

## 2021-08-02 RX ORDER — POTASSIUM CHLORIDE 20 MEQ
40 PACKET (EA) ORAL ONCE
Refills: 0 | Status: COMPLETED | OUTPATIENT
Start: 2021-08-02 | End: 2021-08-02

## 2021-08-02 RX ORDER — ASCORBIC ACID 60 MG
500 TABLET,CHEWABLE ORAL DAILY
Refills: 0 | Status: DISCONTINUED | OUTPATIENT
Start: 2021-08-02 | End: 2021-08-09

## 2021-08-02 RX ORDER — SODIUM CHLORIDE 9 MG/ML
1000 INJECTION INTRAMUSCULAR; INTRAVENOUS; SUBCUTANEOUS ONCE
Refills: 0 | Status: COMPLETED | OUTPATIENT
Start: 2021-08-02 | End: 2021-08-02

## 2021-08-02 RX ORDER — MIRTAZAPINE 45 MG/1
45 TABLET, ORALLY DISINTEGRATING ORAL AT BEDTIME
Refills: 0 | Status: DISCONTINUED | OUTPATIENT
Start: 2021-08-02 | End: 2021-08-09

## 2021-08-02 RX ORDER — FERROUS SULFATE 325(65) MG
325 TABLET ORAL DAILY
Refills: 0 | Status: DISCONTINUED | OUTPATIENT
Start: 2021-08-02 | End: 2021-08-09

## 2021-08-02 RX ADMIN — TRAMADOL HYDROCHLORIDE 50 MILLIGRAM(S): 50 TABLET ORAL at 19:28

## 2021-08-02 RX ADMIN — SODIUM CHLORIDE 1000 MILLILITER(S): 9 INJECTION INTRAMUSCULAR; INTRAVENOUS; SUBCUTANEOUS at 18:52

## 2021-08-02 RX ADMIN — QUETIAPINE FUMARATE 150 MILLIGRAM(S): 200 TABLET, FILM COATED ORAL at 23:48

## 2021-08-02 RX ADMIN — MIRTAZAPINE 45 MILLIGRAM(S): 45 TABLET, ORALLY DISINTEGRATING ORAL at 23:47

## 2021-08-02 RX ADMIN — Medication 40 MILLIEQUIVALENT(S): at 21:19

## 2021-08-02 RX ADMIN — Medication 50 MILLIGRAM(S): at 23:48

## 2021-08-02 RX ADMIN — ATORVASTATIN CALCIUM 10 MILLIGRAM(S): 80 TABLET, FILM COATED ORAL at 23:47

## 2021-08-02 RX ADMIN — CELECOXIB 100 MILLIGRAM(S): 200 CAPSULE ORAL at 19:30

## 2021-08-02 NOTE — ED PROVIDER NOTE - OBJECTIVE STATEMENT
70F former smoker, htn, hld, copd, depression, chronic back pain s/p multiple spinal surgeries, prior admissions for FTT/depression (last admission 3/2018), completed covid19 vaccination (>4w ago), c/o ~30# weight loss in setting of depressed mood/anhedonia/no appetite/decreased po intake. pt reports similar to prior admission. +intermittent loose brown stool. no fever/chills, no ha/dizziness, no uri/cough, no cp/sob, no abd pain/n/v, no hematochezia/melena, no dysuria, no rash, no prior covid, no trauma, no etoh-dpt/ivdu.     pcp: monique [phone]

## 2021-08-02 NOTE — ED BEHAVIORAL HEALTH ASSESSMENT NOTE - HPI (INCLUDE ILLNESS QUALITY, SEVERITY, DURATION, TIMING, CONTEXT, MODIFYING FACTORS, ASSOCIATED SIGNS AND SYMPTOMS)
71yo domiciled F, pph Depression and Anxiety, previous [facility] admissions most recently , [facility] former smoker, htn, hld, copd, chronic back pain s/p multiple spinal surgeries, and completed covid19 vaccination (>4w ago); BIBS endorsing worsening depression sx including a 30lb weight loss over the past several months (similar to previous [facility] admission); Psychiatry consulted for evaluation of depression. Pt. seen individually, she is lying on a stretcher, dressed in hospital gown, calm, cooperative. Pt. is AO x 4, reports her mood as "not so good." She endorses poor sleep, poor energy, poor appetite, and poor concentration. Pt. reports feelings of guilt, helplessness, worthlessness, and hopelessness. Pt. perseverates on her  mother throughout the evaluation, is at times disorganized in her thought process though able to be redirected. Pt. denies SI/HI/AH/VH. Pt. denies recent substance use.  Pt. reports she stopped seeing a psychiatrist at the start of COVID and her PCP has been prescribing her psychiatric medications.  No side effects to medications reported or observed.     As per ED Assessment 3/12/2018: "Pt is a 68 yo single  female domiciled alone since her mother passed away in 12/15, with long history of depression and anxiety, several past psychiatric admissions, most recent one at [facility] in 2017, prior admission to [facility] in  now presents stating she has been feeling progressively more depressed since January. Pt states that she stopped eating about two weeks ago, hoping she would die. She has been having difficulty getting out of bed going to the bathroom. "I know things are getting bad when I have to negotiate with myself if I should get up to go to the bathroom". Pt is unable to identify reasons for living. She states she took care of her mother (who  at [facility] at the age of 95) for twenty years which isolated her. She states she is "alone" and does not have any friends or family. Pt reports poor sleep, poor energy level, amotivation, worthlessness. She states she wants to be in a "safe place". There is no evidence of manic or psychotic symptoms. Pt however is somewhat oddly related and appears severely underweight.   Pt identifies going through her mother's belongings as a triggering event. She also identifies her social isolation to be a contributor to her depression." 71yo domiciled F, pph Depression and Anxiety, previous [facility] admissions most recently 2018, pmh former smoker, htn, hld, copd, chronic back pain s/p multiple spinal surgeries, and completed covid19 vaccination (>4w ago); BIBS endorsing worsening depression sx including a 30lb weight loss over the past several months (similar to previous [facility] admission); Psychiatry consulted for evaluation of depression. Pt. seen individually, she is lying on a stretcher, dressed in hospital gown, calm, cooperative. Pt. is AO x 4, reports her mood as "not so good." She endorses poor sleep, poor energy, poor appetite, and poor concentration. Pt. reports feelings of guilt, helplessness, worthlessness, and hopelessness. Pt. perseverates on her  mother throughout the evaluation, is at times disorganized in her thought process though able to be redirected. Pt. denies SI/HI/AH/VH. Pt. denies recent substance use.  Pt. reports she stopped seeing a psychiatrist at the start of COVID and her PCP has been prescribing her psychiatric medications.  Pt's medication list included gabapentin 100-200mg qHS prn for sleep however pt reports she has not been taking it; additionally reports not taking Magestrol. . No side effects to medications reported or observed.     As per ED Assessment 3/12/2018: "Pt is a 66 yo single  female domiciled alone since her mother passed away in 12/15, with long history of depression and anxiety, several past psychiatric admissions, most recent one at [facility] in 2017, prior admission to [facility] in  now presents stating she has been feeling progressively more depressed since January. Pt states that she stopped eating about two weeks ago, hoping she would die. She has been having difficulty getting out of bed going to the bathroom. "I know things are getting bad when I have to negotiate with myself if I should get up to go to the bathroom". Pt is unable to identify reasons for living. She states she took care of her mother (who  at [facility] at the age of 95) for twenty years which isolated her. She states she is "alone" and does not have any friends or family. Pt reports poor sleep, poor energy level, amotivation, worthlessness. She states she wants to be in a "safe place". There is no evidence of manic or psychotic symptoms. Pt however is somewhat oddly related and appears severely underweight.   Pt identifies going through her mother's belongings as a triggering event. She also identifies her social isolation to be a contributor to her depression."

## 2021-08-02 NOTE — ED BEHAVIORAL HEALTH ASSESSMENT NOTE - SUMMARY
69yo domiciled F, pph Depression and Anxiety, previous [facility] admissions most recently 2018, pmh former smoker, htn, hld, copd, chronic back pain s/p multiple spinal surgeries, and completed covid19 vaccination (>4w ago); BIBS endorsing worsening depression sx including a 30lb weight loss over the past several months (similar to previous [facility] admission); Psychiatry consulted for evaluation of depression.  Pt. requesting Voluntary Psychiatric Hospitalization to ensure safety, stabilize sx, and plan a safe discharge.

## 2021-08-02 NOTE — ED ADULT NURSE NOTE - OBJECTIVE STATEMENT
Pt is a 69 y/o female A&Ox4 in NAD c/o decreased appetite. Pt reports hx of depression and recent weight loss. Pt denies N/V/D, fever/chills, abdominal pain, SI/HI/hallucinations. Pt talking in clear, full sentences, respirations even and unlabored, EKG done and signed, PIV placed, labs sent.

## 2021-08-02 NOTE — ED BEHAVIORAL HEALTH ASSESSMENT NOTE - OTHER PAST PSYCHIATRIC HISTORY (INCLUDE DETAILS REGARDING ONSET, COURSE OF ILLNESS, INPATIENT/OUTPATIENT TREATMENT)
March 2018 - [facility]    [facility] in Nov-Dec 2017  Admission on [facility] in late March '16 for a week for suicidal ideation and depression.

## 2021-08-02 NOTE — ED BEHAVIORAL HEALTH ASSESSMENT NOTE - CURRENT MEDICATION
Seroquel 150mg qHS; Trazodone 50mg qHS;     Tramadol 50mg q6hr prn pain; Pantoprazole 40mg qdaily; Advair 250/50 1 puff BID; Xyzal 5mg qHS; Albuterol inhaler prn; Iron 325mg qdaily; Lipitor 10mg qdaily; Celebrex 100mg BID prn; Folate; B12 Seroquel 150mg qHS; Trazodone 50mg qHS; Remeron 45mg qHS    Tramadol 50mg q6hr prn pain; Pantoprazole 40mg qdaily; Advair 250/50 1 puff BID; Xyzal 5mg qHS; Albuterol inhaler prn; Iron 325mg qdaily; Lipitor 10mg qdaily; Celebrex 100mg BID prn; Folate; B12

## 2021-08-02 NOTE — ED PROVIDER NOTE - CLINICAL SUMMARY MEDICAL DECISION MAKING FREE TEXT BOX
avss. nontoxic. NAD. found to have mild hypokalemia. s/p replacement. s/p ivf. medically cleared. psych consulted. will admit per reccs for voluntary admission.

## 2021-08-02 NOTE — ED ADULT NURSE NOTE - NS ED NURSE LEVEL OF CONSCIOUSNESS SPEECH
Speaking Coherently Ears: no ear pain and no hearing problems.Nose: no nasal congestion and no nasal drainage.Mouth/Throat: no dysphagia, no hoarseness and no throat pain.Neck: no lumps, no pain, no stiffness and no swollen glands.
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
## 2021-08-02 NOTE — ED PROVIDER NOTE - IV ALTEPLASE INCLUSION HIDDEN
[facility]  Pulmonary, Critical Care, and Sleep Medicine  Name: Berry Dooley MRN: [id number]   : 1948 Hospital: [address] [facility]   Date: 3/5/2017        Impression Plan   1. Chest pain  2. Elevated troponin  3. URI/PND  4. Hx of MV prolapse s/p MV replacement 5 years ago  5. Hx of afib  6. · Cardiology consult  · Therapeutic enoxaparin  · ASA  · ACE  · Too bradycardic for BB  · flonase  · NPO       Radiology  ( personally reviewed) CXR reviewed: no infiltrates   ABG No results for input(s): PHI, PO2I, PCO2I in the last 72 hours. Subjective     Patient is a 77 y.o. male with PMHx of mitral valve proplapse and repair 5 years ago presenting with chest pain while walking his dog. Last for 30 min. Troponin has increased overnight. Pt is chest pain free. Had cath before [facility] which was unremarkable per pt. Quit smoking in his 25s. No family hx of CAD. Has good cholesterol. Is chest pain free currently. Review of Systems:  A comprehensive review of systems was negative except for that written in the HPI. Past Medical History:   Diagnosis Date    A-fib ([facility] [address]     Acid reflux     CAD (coronary artery disease)     Hypertension       Past Surgical History:   Procedure Laterality Date    HX AORTIC VALVE REPLACEMENT        Prior to Admission medications    Medication Sig Start Date End Date Taking? Authorizing Provider   azithromycin (ZITHROMAX) 250 mg tablet Take 250 mg by mouth daily. X 5 days started 2/28/17 2/28/17 3/4/17 Yes Phys Other, MD   dilTIAZem CD (CARDIZEM CD) 240 mg ER capsule Take 240 mg by mouth nightly. Yes Phys Other, MD   pantoprazole (PROTONIX) 40 mg tablet Take 40 mg by mouth Daily (before breakfast). Indications: GASTROESOPHAGEAL REFLUX   Yes Historical Provider   ALPRAZolam (XANAX) 0.5 mg tablet Take 0.25-0.5 mg by mouth two (2) times daily as needed for Anxiety (Patient reports anxiety attacks).    Yes Historical Provider lisinopril (PRINIVIL, ZESTRIL) 10 mg tablet Take 10 mg by mouth nightly. Yes Historical Provider   metoprolol tartrate (LOPRESSOR) 25 mg tablet Take 25 mg by mouth two (2) times a day. Yes Historical Provider   dicyclomine (BENTYL) 20 mg tablet Take 20 mg by mouth two (2) times daily as needed. Yes Historical Provider   benzonatate (TESSALON PERLES) 100 mg capsule Take 100 mg by mouth three (3) times daily as needed for Cough. Yes Historical Provider   Phenylephrine-DM-Acetaminophen (VICKS DAYQUIL COLD-FLU RELIEF) 5- mg/15 mL liqd Take 15 mL by mouth daily as needed. Yes Historical Provider   oxymetazoline (AFRIN, OXYMETAZOLINE,) 0.05 % nasal spray 2 Sprays by Both Nostrils route daily as needed for Congestion. Yes Historical Provider   doxylamine-dm (VICKS NYQUIL COUGH) 6.25-15 mg/15 mL soln Take 15 mL by mouth nightly as needed.    Yes Historical Provider     Current Facility-Administered Medications   Medication Dose Route Frequency    aspirin chewable tablet 81 mg  81 mg Oral DAILY    enoxaparin (LOVENOX) injection 80 mg  1 mg/kg SubCUTAneous Q12H    0.9% sodium chloride infusion  75 mL/hr IntraVENous CONTINUOUS    sodium chloride (NS) flush 5-10 mL  5-10 mL IntraVENous Q8H    influenza vaccine 2016-17 (36mos+)(PF) (FLUZONE/FLUARIX/FLULAVAL QUAD) injection 0.5 mL  0.5 mL IntraMUSCular PRIOR TO DISCHARGE    dilTIAZem CD (CARDIZEM CD) capsule 240 mg  240 mg Oral QHS    lisinopril (PRINIVIL, ZESTRIL) tablet 10 mg  10 mg Oral QHS    metoprolol tartrate (LOPRESSOR) tablet 25 mg  25 mg Oral BID    pantoprazole (PROTONIX) tablet 40 mg  40 mg Oral ACB     No Known Allergies   Social History   Substance Use Topics    Smoking status: Never Smoker    Smokeless tobacco: Never Used    Alcohol use 4.0 oz/week     8 Cans of beer per week      Comment: every other night      Family History   Problem Relation Age of Onset    Stroke Mother     Hypertension Father           Laboratory: I have personally reviewed the critical care flowsheet and labs.      Recent Labs      03/05/17 0527 03/04/17   1525   WBC  6.4  11.2*   HGB  13.0  14.5   HCT  41.0  43.3   PLT  174  215     Recent Labs      03/05/17   0527 03/04/17   1525   NA  142  140   K  4.5  3.5   CL  109*  105   CO2  24  21   GLU  98  95   BUN  17  21*   CREA  0.82  1.12   CA  8.5  9.6   MG  2.0   --    PHOS  4.0   --        Objective:     Mode Rate Tidal Volume Pressure FiO2 PEEP                    Vital Signs:     TMAX(24)      Intake/Output:   Last shift:         Last 3 shifts:  [id number]  Intake/Output Summary (Last 24 hours) at 03/05/17 6021  Last data filed at 03/05/17 0700   Gross per 24 hour   Intake           876.25 ml   Output             1220 ml   Net          -343.75 ml     EXAM:   GENERAL: well developed and in no distress, HEENT:  PERRL, EOMI, no alar flaring or epistaxis, oral mucosa moist without cyanosis, NECK:  no jugular vein distention, no retractions, no thyromegaly or masses, LUNGS: CTA, no w/r/r , HEART:  Regular rate and rhythm with no MGR; no edema is present, ABDOMEN:  soft with no tenderness, bowel sounds present, EXTREMITIES:  warm with no cyanosis, SKIN:  no jaundice or ecchymosis and NEUROLOGIC:  alert and oriented, grossly non-focal    Maricruz Randall MD  [facility] show

## 2021-08-02 NOTE — ED PROVIDER NOTE - PHYSICAL EXAMINATION
CONST: nontoxic NAD speaking in full sentences  HEAD: atraumatic  EYES: conjunctivae clear, PERRL, EOMI  ENT: mmm  NECK: supple/FROM, nttp, no jvd  CARD: rrr no murmurs  CHEST: ctab no r/r/w, no stridor/retractions/tripoding  ABD: soft, nd, nttp, no rebound/guarding  EXT: FROM, symmetric distal pulses intact  SKIN: warm, dry, no rash, no pedal edema/ttp/rash, cap refill <2sec  NEURO: a+ox3, CN II-XII intact, 5/5 strength x4, gross sensation intact x4, baseline gait
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
## 2021-08-02 NOTE — ED CLERICAL - NS ED CLERK NOTE PRE-ARRIVAL INFORMATION; ADDITIONAL PRE-ARRIVAL INFORMATION
dehydrated  Lost 13 pounds within the last 5 months  eating very little   on going diarrhea  heart rate increase

## 2021-08-03 DIAGNOSIS — I10 ESSENTIAL (PRIMARY) HYPERTENSION: ICD-10-CM

## 2021-08-03 DIAGNOSIS — J44.9 CHRONIC OBSTRUCTIVE PULMONARY DISEASE, UNSPECIFIED: ICD-10-CM

## 2021-08-03 DIAGNOSIS — F32.2 MAJOR DEPRESSIVE DISORDER, SINGLE EPISODE, SEVERE WITHOUT PSYCHOTIC FEATURES: ICD-10-CM

## 2021-08-03 DIAGNOSIS — E78.5 HYPERLIPIDEMIA, UNSPECIFIED: ICD-10-CM

## 2021-08-03 DIAGNOSIS — M25.569 PAIN IN UNSPECIFIED KNEE: ICD-10-CM

## 2021-08-03 LAB
A1C WITH ESTIMATED AVERAGE GLUCOSE RESULT: 5.5 % — SIGNIFICANT CHANGE UP (ref 4–5.6)
ALBUMIN SERPL ELPH-MCNC: 4.6 G/DL — SIGNIFICANT CHANGE UP (ref 3.3–5)
ALP SERPL-CCNC: 83 U/L — SIGNIFICANT CHANGE UP (ref 40–120)
ALT FLD-CCNC: 7 U/L — LOW (ref 10–45)
ANION GAP SERPL CALC-SCNC: 13 MMOL/L — SIGNIFICANT CHANGE UP (ref 5–17)
AST SERPL-CCNC: 16 U/L — SIGNIFICANT CHANGE UP (ref 10–40)
BILIRUB SERPL-MCNC: 0.6 MG/DL — SIGNIFICANT CHANGE UP (ref 0.2–1.2)
BUN SERPL-MCNC: 4 MG/DL — LOW (ref 7–23)
CALCIUM SERPL-MCNC: 10 MG/DL — SIGNIFICANT CHANGE UP (ref 8.4–10.5)
CHLORIDE SERPL-SCNC: 108 MMOL/L — SIGNIFICANT CHANGE UP (ref 96–108)
CHOLEST SERPL-MCNC: 111 MG/DL — SIGNIFICANT CHANGE UP
CO2 SERPL-SCNC: 22 MMOL/L — SIGNIFICANT CHANGE UP (ref 22–31)
COVID-19 SPIKE DOMAIN AB INTERP: POSITIVE
COVID-19 SPIKE DOMAIN ANTIBODY RESULT: 172 U/ML — HIGH
CREAT SERPL-MCNC: 0.62 MG/DL — SIGNIFICANT CHANGE UP (ref 0.5–1.3)
ESTIMATED AVERAGE GLUCOSE: 111 MG/DL — SIGNIFICANT CHANGE UP (ref 68–114)
FERRITIN SERPL-MCNC: 201 NG/ML — HIGH (ref 15–150)
FOLATE SERPL-MCNC: 8.5 NG/ML — SIGNIFICANT CHANGE UP
GLUCOSE SERPL-MCNC: 116 MG/DL — HIGH (ref 70–99)
HCT VFR BLD CALC: 43.3 % — SIGNIFICANT CHANGE UP (ref 34.5–45)
HDLC SERPL-MCNC: 44 MG/DL — LOW
HGB BLD-MCNC: 14 G/DL — SIGNIFICANT CHANGE UP (ref 11.5–15.5)
IRON SATN MFR SERPL: 136 UG/DL — SIGNIFICANT CHANGE UP (ref 30–160)
IRON SATN MFR SERPL: 45 % — SIGNIFICANT CHANGE UP (ref 14–50)
LIPID PNL WITH DIRECT LDL SERPL: 56 MG/DL — SIGNIFICANT CHANGE UP
MAGNESIUM SERPL-MCNC: 1.7 MG/DL — SIGNIFICANT CHANGE UP (ref 1.6–2.6)
MCHC RBC-ENTMCNC: 32.3 GM/DL — SIGNIFICANT CHANGE UP (ref 32–36)
MCHC RBC-ENTMCNC: 33.1 PG — SIGNIFICANT CHANGE UP (ref 27–34)
MCV RBC AUTO: 102.4 FL — HIGH (ref 80–100)
NON HDL CHOLESTEROL: 67 MG/DL — SIGNIFICANT CHANGE UP
NRBC # BLD: 0 /100 WBCS — SIGNIFICANT CHANGE UP (ref 0–0)
PLATELET # BLD AUTO: 314 K/UL — SIGNIFICANT CHANGE UP (ref 150–400)
POTASSIUM SERPL-MCNC: 4 MMOL/L — SIGNIFICANT CHANGE UP (ref 3.5–5.3)
POTASSIUM SERPL-SCNC: 4 MMOL/L — SIGNIFICANT CHANGE UP (ref 3.5–5.3)
PROT SERPL-MCNC: 7.4 G/DL — SIGNIFICANT CHANGE UP (ref 6–8.3)
RBC # BLD: 4.23 M/UL — SIGNIFICANT CHANGE UP (ref 3.8–5.2)
RBC # FLD: 15.3 % — HIGH (ref 10.3–14.5)
SARS-COV-2 IGG+IGM SERPL QL IA: 172 U/ML — HIGH
SARS-COV-2 IGG+IGM SERPL QL IA: POSITIVE
SODIUM SERPL-SCNC: 143 MMOL/L — SIGNIFICANT CHANGE UP (ref 135–145)
TIBC SERPL-MCNC: 300 UG/DL — SIGNIFICANT CHANGE UP (ref 220–430)
TRIGL SERPL-MCNC: 56 MG/DL — SIGNIFICANT CHANGE UP
TSH SERPL-MCNC: 3.5 UIU/ML — SIGNIFICANT CHANGE UP (ref 0.27–4.2)
UIBC SERPL-MCNC: 164 UG/DL — SIGNIFICANT CHANGE UP (ref 110–370)
VIT B12 SERPL-MCNC: 344 PG/ML — SIGNIFICANT CHANGE UP (ref 232–1245)
WBC # BLD: 6.8 K/UL — SIGNIFICANT CHANGE UP (ref 3.8–10.5)
WBC # FLD AUTO: 6.8 K/UL — SIGNIFICANT CHANGE UP (ref 3.8–10.5)

## 2021-08-03 PROCEDURE — 99223 1ST HOSP IP/OBS HIGH 75: CPT

## 2021-08-03 RX ORDER — CELECOXIB 200 MG/1
100 CAPSULE ORAL EVERY 12 HOURS
Refills: 0 | Status: DISCONTINUED | OUTPATIENT
Start: 2021-08-03 | End: 2021-08-05

## 2021-08-03 RX ADMIN — TRAMADOL HYDROCHLORIDE 50 MILLIGRAM(S): 50 TABLET ORAL at 12:10

## 2021-08-03 RX ADMIN — Medication 50 MILLIGRAM(S): at 07:51

## 2021-08-03 RX ADMIN — LORATADINE 10 MILLIGRAM(S): 10 TABLET ORAL at 10:10

## 2021-08-03 RX ADMIN — Medication 50 MILLIGRAM(S): at 21:32

## 2021-08-03 RX ADMIN — TRAMADOL HYDROCHLORIDE 50 MILLIGRAM(S): 50 TABLET ORAL at 11:13

## 2021-08-03 RX ADMIN — TRAMADOL HYDROCHLORIDE 50 MILLIGRAM(S): 50 TABLET ORAL at 19:25

## 2021-08-03 RX ADMIN — ATORVASTATIN CALCIUM 10 MILLIGRAM(S): 80 TABLET, FILM COATED ORAL at 21:32

## 2021-08-03 RX ADMIN — MIRTAZAPINE 45 MILLIGRAM(S): 45 TABLET, ORALLY DISINTEGRATING ORAL at 21:29

## 2021-08-03 RX ADMIN — CELECOXIB 100 MILLIGRAM(S): 200 CAPSULE ORAL at 11:10

## 2021-08-03 RX ADMIN — PANTOPRAZOLE SODIUM 40 MILLIGRAM(S): 20 TABLET, DELAYED RELEASE ORAL at 07:16

## 2021-08-03 RX ADMIN — Medication 400 UNIT(S): at 21:32

## 2021-08-03 RX ADMIN — Medication 1 TABLET(S): at 09:17

## 2021-08-03 RX ADMIN — Medication 400 UNIT(S): at 10:10

## 2021-08-03 RX ADMIN — QUETIAPINE FUMARATE 150 MILLIGRAM(S): 200 TABLET, FILM COATED ORAL at 21:31

## 2021-08-03 RX ADMIN — PREGABALIN 1000 MICROGRAM(S): 225 CAPSULE ORAL at 10:10

## 2021-08-03 RX ADMIN — CELECOXIB 100 MILLIGRAM(S): 200 CAPSULE ORAL at 10:10

## 2021-08-03 RX ADMIN — Medication 1 MILLIGRAM(S): at 09:17

## 2021-08-03 RX ADMIN — Medication 325 MILLIGRAM(S): at 09:17

## 2021-08-03 RX ADMIN — Medication 500 MILLIGRAM(S): at 09:17

## 2021-08-03 RX ADMIN — CELECOXIB 100 MILLIGRAM(S): 200 CAPSULE ORAL at 19:25

## 2021-08-03 RX ADMIN — ALBUTEROL 2 PUFF(S): 90 AEROSOL, METERED ORAL at 10:17

## 2021-08-03 NOTE — BEHAVIORAL HEALTH ASSESSMENT NOTE - NSBHLOC_PSY_A_CORE
Pt arrives with son for Covid concern. Was diagnosed with positive Covid 2 weeks ago. States still having lingering cough and is concerned has a different strain of Covid. ABCs intact.    Alert

## 2021-08-03 NOTE — BEHAVIORAL HEALTH ASSESSMENT NOTE - NSBHCHARTREVIEWVS_PSY_A_CORE FT
Vital Signs Last 24 Hrs  T(C): 36.5 (03 Aug 2021 09:00), Max: 36.9 (02 Aug 2021 22:55)  T(F): 97.7 (03 Aug 2021 09:00), Max: 98.4 (02 Aug 2021 22:55)  HR: 109 (03 Aug 2021 09:00) (94 - 109)  BP: 160/86 (03 Aug 2021 09:00) (155/90 - 168/90)  BP(mean): --  RR: 15 (03 Aug 2021 09:00) (15 - 20)  SpO2: 96% (03 Aug 2021 09:00) (95% - 98%)

## 2021-08-03 NOTE — BEHAVIORAL HEALTH ASSESSMENT NOTE - HPI (INCLUDE ILLNESS QUALITY, SEVERITY, DURATION, TIMING, CONTEXT, MODIFYING FACTORS, ASSOCIATED SIGNS AND SYMPTOMS)
Patient is a 71y/o single, domiciled alone AAF with medical history significant for HTN, HLD, COPD, Chronic back pain s/p multiple spinal surgeries, former smoker. Had first dose of Moderna vaccine on 7/8/2021. Psychiatric hx significant for prior hospitalizations, last on [facility] 3/2017, diagnoses including MDD and anxiety, not currently in psychiatric treatment, but taking antidepressants etc prescribed by PCP. Patient denies any current legal issues. Patient self-presented to ED with c/o depression, poor appetite and weight loss. Admitted to [facility] voluntary status.    Patient seen at bedside with team. She states 'this not eating thing has been going on for a while, a couple months now.' She stated that when she stopped eating she knew was in trouble, and soon began losing weight and having loose bowels. She reports worsening depression, anxiety and anhedonia for the last several months additionally. Has lost an estimated 30lbs over the last few months, though she has meals on wheels service her appetite has still been quite poor. She reports that she came to the hospital thinking that she would be receiving IV fluids and nutrition and did not expect to be psychiatrically admitted. Denies any hx of a/v h, paranoid ideation etc. No current si/hi. Sleep has been poor at night, though she has been napping during the day while watching tv. Has been able to maintain ADLs.    She reported that she had been receiving treatment in the past at [facility] and attending groups, but has not had a psychiatrist in the last couple years and has been receiving all medications from her PCP. In the ED she reported that she had not been taking her gabapentin 100-200mg qhs prn for sleep as well as her Magestrol for appetite. However during evaluation on the unit, she endorsed compliance with all of her medications. Also reports having HHA several times a week since being discharged from rehab after a foot injury. Verbalizes having no social support, friends or family 'I'm all alone.' Is interested in being set up with psychiatrist and therapist for individual sessions after discharge and adjusting medications during her admission.     Irritability noted when pt asked if her clothing would be ironed before discharged and was told that there was no iron available she became abrupt and dismissive.

## 2021-08-03 NOTE — BEHAVIORAL HEALTH ASSESSMENT NOTE - MOOD
incidental finding on CT A&P: KIDNEYS/URETERS: A few cysts. Left parapelvic cysts. Moderate right hydronephrosis to the UPJ  -no urinary symptoms  -per urology: given pt no urinary symptoms/abd pain, Cr wnl, pyelonephritis, no urgent intervention needs to be done at this time. Outpt f/u Depressed/Anxious

## 2021-08-03 NOTE — BEHAVIORAL HEALTH ASSESSMENT NOTE - SUMMARY
Patient is a 71y/o single, domiciled alone AAF with medical history significant for HTN, HLD, COPD, Chronic back pain s/p multiple spinal surgeries, former smoker. Had first dose of Moderna vaccine on 7/8/2021. Psychiatric hx significant for prior hospitalizations, last on [facility] 3/2017, diagnoses including MDD and anxiety, not currently in psychiatric treatment, but taking antidepressants etc prescribed by PCP. Patient denies any current legal issues. Patient self-presented to ED with c/o depression, poor appetite and weight loss. Admitted to [facility] voluntary status.  Patient reports 30lb weight loss in last several months, would like medications to be adjusted as well as get referral for psychiatrist and psychotherapist.

## 2021-08-03 NOTE — DIETITIAN NUTRITION RISK NOTIFICATION - TREATMENT: THE FOLLOWING DIET HAS BEEN RECOMMENDED
1. Rec diet: DASH TLC Diet  2. Rec include Ensure Enlive BID  3. Monitor %PO intake, encourage during meal times 1. Rec diet: DASH TLC Diet  2. Rec include Ensure Enlive BID  3. Pt endorses loose stools/diarrhea for weeks, rec banatrol 1 xday  4. Monitor %PO intake, encourage during meal times

## 2021-08-04 LAB
A1C WITH ESTIMATED AVERAGE GLUCOSE RESULT: 5.4 % — SIGNIFICANT CHANGE UP (ref 4–5.6)
CHOLEST SERPL-MCNC: 97 MG/DL — SIGNIFICANT CHANGE UP
CULTURE RESULTS: SIGNIFICANT CHANGE UP
ESTIMATED AVERAGE GLUCOSE: 108 MG/DL — SIGNIFICANT CHANGE UP (ref 68–114)
HDLC SERPL-MCNC: 40 MG/DL — LOW
LIPID PNL WITH DIRECT LDL SERPL: 49 MG/DL — SIGNIFICANT CHANGE UP
NON HDL CHOLESTEROL: 57 MG/DL — SIGNIFICANT CHANGE UP
SPECIMEN SOURCE: SIGNIFICANT CHANGE UP
TRIGL SERPL-MCNC: 39 MG/DL — SIGNIFICANT CHANGE UP

## 2021-08-04 PROCEDURE — 99233 SBSQ HOSP IP/OBS HIGH 50: CPT

## 2021-08-04 RX ORDER — LOPERAMIDE HCL 2 MG
2 TABLET ORAL THREE TIMES A DAY
Refills: 0 | Status: DISCONTINUED | OUTPATIENT
Start: 2021-08-04 | End: 2021-08-09

## 2021-08-04 RX ADMIN — Medication 1 TABLET(S): at 09:36

## 2021-08-04 RX ADMIN — Medication 400 UNIT(S): at 21:32

## 2021-08-04 RX ADMIN — ATORVASTATIN CALCIUM 10 MILLIGRAM(S): 80 TABLET, FILM COATED ORAL at 21:32

## 2021-08-04 RX ADMIN — MIRTAZAPINE 45 MILLIGRAM(S): 45 TABLET, ORALLY DISINTEGRATING ORAL at 21:32

## 2021-08-04 RX ADMIN — CELECOXIB 100 MILLIGRAM(S): 200 CAPSULE ORAL at 20:10

## 2021-08-04 RX ADMIN — Medication 1 MILLIGRAM(S): at 09:36

## 2021-08-04 RX ADMIN — Medication 500 MILLIGRAM(S): at 11:58

## 2021-08-04 RX ADMIN — ALBUTEROL 2 PUFF(S): 90 AEROSOL, METERED ORAL at 09:35

## 2021-08-04 RX ADMIN — TRAMADOL HYDROCHLORIDE 50 MILLIGRAM(S): 50 TABLET ORAL at 20:10

## 2021-08-04 RX ADMIN — TRAMADOL HYDROCHLORIDE 50 MILLIGRAM(S): 50 TABLET ORAL at 13:00

## 2021-08-04 RX ADMIN — Medication 325 MILLIGRAM(S): at 09:36

## 2021-08-04 RX ADMIN — CELECOXIB 100 MILLIGRAM(S): 200 CAPSULE ORAL at 19:07

## 2021-08-04 RX ADMIN — LORATADINE 10 MILLIGRAM(S): 10 TABLET ORAL at 09:36

## 2021-08-04 RX ADMIN — ALBUTEROL 2 PUFF(S): 90 AEROSOL, METERED ORAL at 19:05

## 2021-08-04 RX ADMIN — PREGABALIN 1000 MICROGRAM(S): 225 CAPSULE ORAL at 09:36

## 2021-08-04 RX ADMIN — Medication 50 MILLIGRAM(S): at 09:36

## 2021-08-04 RX ADMIN — BUDESONIDE AND FORMOTEROL FUMARATE DIHYDRATE 2 PUFF(S): 160; 4.5 AEROSOL RESPIRATORY (INHALATION) at 21:33

## 2021-08-04 RX ADMIN — CELECOXIB 100 MILLIGRAM(S): 200 CAPSULE ORAL at 06:16

## 2021-08-04 RX ADMIN — TRAMADOL HYDROCHLORIDE 50 MILLIGRAM(S): 50 TABLET ORAL at 11:58

## 2021-08-04 RX ADMIN — QUETIAPINE FUMARATE 150 MILLIGRAM(S): 200 TABLET, FILM COATED ORAL at 21:33

## 2021-08-04 RX ADMIN — Medication 400 UNIT(S): at 09:36

## 2021-08-04 RX ADMIN — Medication 50 MILLIGRAM(S): at 21:32

## 2021-08-04 RX ADMIN — TRAMADOL HYDROCHLORIDE 50 MILLIGRAM(S): 50 TABLET ORAL at 19:06

## 2021-08-04 RX ADMIN — Medication 1 TABLET(S): at 21:33

## 2021-08-04 RX ADMIN — PANTOPRAZOLE SODIUM 40 MILLIGRAM(S): 20 TABLET, DELAYED RELEASE ORAL at 06:17

## 2021-08-04 RX ADMIN — Medication 2 MILLIGRAM(S): at 11:58

## 2021-08-05 PROCEDURE — 99233 SBSQ HOSP IP/OBS HIGH 50: CPT

## 2021-08-05 RX ORDER — CELECOXIB 200 MG/1
100 CAPSULE ORAL
Refills: 0 | Status: DISCONTINUED | OUTPATIENT
Start: 2021-08-05 | End: 2021-08-09

## 2021-08-05 RX ADMIN — Medication 1 TABLET(S): at 10:04

## 2021-08-05 RX ADMIN — CELECOXIB 100 MILLIGRAM(S): 200 CAPSULE ORAL at 12:28

## 2021-08-05 RX ADMIN — Medication 50 MILLIGRAM(S): at 21:10

## 2021-08-05 RX ADMIN — Medication 1 TABLET(S): at 21:10

## 2021-08-05 RX ADMIN — Medication 400 UNIT(S): at 09:51

## 2021-08-05 RX ADMIN — ALBUTEROL 2 PUFF(S): 90 AEROSOL, METERED ORAL at 17:57

## 2021-08-05 RX ADMIN — Medication 50 MILLIGRAM(S): at 09:50

## 2021-08-05 RX ADMIN — Medication 325 MILLIGRAM(S): at 10:04

## 2021-08-05 RX ADMIN — Medication 500 MILLIGRAM(S): at 10:03

## 2021-08-05 RX ADMIN — Medication 1 TABLET(S): at 09:51

## 2021-08-05 RX ADMIN — CELECOXIB 100 MILLIGRAM(S): 200 CAPSULE ORAL at 07:02

## 2021-08-05 RX ADMIN — TRAMADOL HYDROCHLORIDE 50 MILLIGRAM(S): 50 TABLET ORAL at 12:28

## 2021-08-05 RX ADMIN — QUETIAPINE FUMARATE 150 MILLIGRAM(S): 200 TABLET, FILM COATED ORAL at 21:10

## 2021-08-05 RX ADMIN — PREGABALIN 1000 MICROGRAM(S): 225 CAPSULE ORAL at 10:04

## 2021-08-05 RX ADMIN — Medication 1 MILLIGRAM(S): at 10:04

## 2021-08-05 RX ADMIN — CELECOXIB 100 MILLIGRAM(S): 200 CAPSULE ORAL at 21:09

## 2021-08-05 RX ADMIN — LORATADINE 10 MILLIGRAM(S): 10 TABLET ORAL at 10:04

## 2021-08-05 RX ADMIN — Medication 400 UNIT(S): at 21:11

## 2021-08-05 RX ADMIN — BUDESONIDE AND FORMOTEROL FUMARATE DIHYDRATE 2 PUFF(S): 160; 4.5 AEROSOL RESPIRATORY (INHALATION) at 09:51

## 2021-08-05 RX ADMIN — ALBUTEROL 2 PUFF(S): 90 AEROSOL, METERED ORAL at 10:04

## 2021-08-05 RX ADMIN — TRAMADOL HYDROCHLORIDE 50 MILLIGRAM(S): 50 TABLET ORAL at 10:21

## 2021-08-05 RX ADMIN — MIRTAZAPINE 45 MILLIGRAM(S): 45 TABLET, ORALLY DISINTEGRATING ORAL at 21:10

## 2021-08-05 RX ADMIN — BUDESONIDE AND FORMOTEROL FUMARATE DIHYDRATE 2 PUFF(S): 160; 4.5 AEROSOL RESPIRATORY (INHALATION) at 21:09

## 2021-08-05 RX ADMIN — PANTOPRAZOLE SODIUM 40 MILLIGRAM(S): 20 TABLET, DELAYED RELEASE ORAL at 07:02

## 2021-08-05 RX ADMIN — ATORVASTATIN CALCIUM 10 MILLIGRAM(S): 80 TABLET, FILM COATED ORAL at 21:10

## 2021-08-06 PROCEDURE — 99233 SBSQ HOSP IP/OBS HIGH 50: CPT

## 2021-08-06 RX ORDER — ASCORBIC ACID 60 MG
1 TABLET,CHEWABLE ORAL
Qty: 0 | Refills: 0 | DISCHARGE
Start: 2021-08-06

## 2021-08-06 RX ORDER — PREGABALIN 225 MG/1
1 CAPSULE ORAL
Qty: 0 | Refills: 0 | DISCHARGE
Start: 2021-08-06

## 2021-08-06 RX ORDER — LORATADINE 10 MG/1
1 TABLET ORAL
Qty: 0 | Refills: 0 | DISCHARGE
Start: 2021-08-06

## 2021-08-06 RX ORDER — QUETIAPINE FUMARATE 200 MG/1
3 TABLET, FILM COATED ORAL
Qty: 0 | Refills: 0 | DISCHARGE
Start: 2021-08-06

## 2021-08-06 RX ORDER — TRAZODONE HCL 50 MG
1 TABLET ORAL
Qty: 0 | Refills: 0 | DISCHARGE
Start: 2021-08-06

## 2021-08-06 RX ORDER — FOLIC ACID 0.8 MG
1 TABLET ORAL
Qty: 0 | Refills: 0 | DISCHARGE
Start: 2021-08-06

## 2021-08-06 RX ORDER — ATORVASTATIN CALCIUM 80 MG/1
1 TABLET, FILM COATED ORAL
Qty: 0 | Refills: 0 | DISCHARGE
Start: 2021-08-06

## 2021-08-06 RX ORDER — METOPROLOL TARTRATE 50 MG
1 TABLET ORAL
Qty: 0 | Refills: 0 | DISCHARGE
Start: 2021-08-06

## 2021-08-06 RX ORDER — PANTOPRAZOLE SODIUM 20 MG/1
1 TABLET, DELAYED RELEASE ORAL
Qty: 0 | Refills: 0 | DISCHARGE
Start: 2021-08-06

## 2021-08-06 RX ORDER — MIRTAZAPINE 45 MG/1
1 TABLET, ORALLY DISINTEGRATING ORAL
Qty: 0 | Refills: 0 | DISCHARGE
Start: 2021-08-06

## 2021-08-06 RX ORDER — TRAMADOL HYDROCHLORIDE 50 MG/1
1 TABLET ORAL
Qty: 0 | Refills: 0 | DISCHARGE
Start: 2021-08-06

## 2021-08-06 RX ORDER — CHOLECALCIFEROL (VITAMIN D3) 125 MCG
400 CAPSULE ORAL
Qty: 0 | Refills: 0 | DISCHARGE
Start: 2021-08-06

## 2021-08-06 RX ORDER — AMLODIPINE BESYLATE 2.5 MG/1
5 TABLET ORAL DAILY
Refills: 0 | Status: DISCONTINUED | OUTPATIENT
Start: 2021-08-06 | End: 2021-08-09

## 2021-08-06 RX ORDER — CELECOXIB 200 MG/1
1 CAPSULE ORAL
Qty: 0 | Refills: 0 | DISCHARGE
Start: 2021-08-06

## 2021-08-06 RX ORDER — CALCIUM CARBONATE 500(1250)
1 TABLET ORAL
Qty: 0 | Refills: 0 | DISCHARGE
Start: 2021-08-06

## 2021-08-06 RX ADMIN — AMLODIPINE BESYLATE 5 MILLIGRAM(S): 2.5 TABLET ORAL at 10:37

## 2021-08-06 RX ADMIN — Medication 50 MILLIGRAM(S): at 21:43

## 2021-08-06 RX ADMIN — QUETIAPINE FUMARATE 25 MILLIGRAM(S): 200 TABLET, FILM COATED ORAL at 04:40

## 2021-08-06 RX ADMIN — CELECOXIB 100 MILLIGRAM(S): 200 CAPSULE ORAL at 22:00

## 2021-08-06 RX ADMIN — PANTOPRAZOLE SODIUM 40 MILLIGRAM(S): 20 TABLET, DELAYED RELEASE ORAL at 07:14

## 2021-08-06 RX ADMIN — BUDESONIDE AND FORMOTEROL FUMARATE DIHYDRATE 2 PUFF(S): 160; 4.5 AEROSOL RESPIRATORY (INHALATION) at 10:00

## 2021-08-06 RX ADMIN — Medication 400 UNIT(S): at 10:37

## 2021-08-06 RX ADMIN — TRAMADOL HYDROCHLORIDE 50 MILLIGRAM(S): 50 TABLET ORAL at 21:43

## 2021-08-06 RX ADMIN — Medication 50 MILLIGRAM(S): at 07:58

## 2021-08-06 RX ADMIN — ALBUTEROL 2 PUFF(S): 90 AEROSOL, METERED ORAL at 10:39

## 2021-08-06 RX ADMIN — TRAMADOL HYDROCHLORIDE 50 MILLIGRAM(S): 50 TABLET ORAL at 22:30

## 2021-08-06 RX ADMIN — Medication 1 TABLET(S): at 10:37

## 2021-08-06 RX ADMIN — TRAMADOL HYDROCHLORIDE 50 MILLIGRAM(S): 50 TABLET ORAL at 04:41

## 2021-08-06 RX ADMIN — BUDESONIDE AND FORMOTEROL FUMARATE DIHYDRATE 2 PUFF(S): 160; 4.5 AEROSOL RESPIRATORY (INHALATION) at 21:42

## 2021-08-06 RX ADMIN — TRAMADOL HYDROCHLORIDE 50 MILLIGRAM(S): 50 TABLET ORAL at 17:00

## 2021-08-06 RX ADMIN — CELECOXIB 100 MILLIGRAM(S): 200 CAPSULE ORAL at 11:30

## 2021-08-06 RX ADMIN — CELECOXIB 100 MILLIGRAM(S): 200 CAPSULE ORAL at 10:38

## 2021-08-06 RX ADMIN — LORATADINE 10 MILLIGRAM(S): 10 TABLET ORAL at 10:37

## 2021-08-06 RX ADMIN — Medication 1 MILLIGRAM(S): at 10:39

## 2021-08-06 RX ADMIN — QUETIAPINE FUMARATE 150 MILLIGRAM(S): 200 TABLET, FILM COATED ORAL at 21:43

## 2021-08-06 RX ADMIN — PREGABALIN 1000 MICROGRAM(S): 225 CAPSULE ORAL at 10:37

## 2021-08-06 RX ADMIN — ATORVASTATIN CALCIUM 10 MILLIGRAM(S): 80 TABLET, FILM COATED ORAL at 21:43

## 2021-08-06 RX ADMIN — TRAMADOL HYDROCHLORIDE 50 MILLIGRAM(S): 50 TABLET ORAL at 05:00

## 2021-08-06 RX ADMIN — Medication 500 MILLIGRAM(S): at 10:38

## 2021-08-06 RX ADMIN — Medication 1 TABLET(S): at 21:44

## 2021-08-06 RX ADMIN — MIRTAZAPINE 45 MILLIGRAM(S): 45 TABLET, ORALLY DISINTEGRATING ORAL at 21:44

## 2021-08-06 RX ADMIN — CELECOXIB 100 MILLIGRAM(S): 200 CAPSULE ORAL at 21:43

## 2021-08-06 RX ADMIN — TRAMADOL HYDROCHLORIDE 50 MILLIGRAM(S): 50 TABLET ORAL at 16:06

## 2021-08-06 RX ADMIN — Medication 325 MILLIGRAM(S): at 10:37

## 2021-08-06 RX ADMIN — Medication 400 UNIT(S): at 21:44
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
## 2021-08-07 RX ADMIN — Medication 1 TABLET(S): at 09:58

## 2021-08-07 RX ADMIN — CELECOXIB 100 MILLIGRAM(S): 200 CAPSULE ORAL at 09:58

## 2021-08-07 RX ADMIN — BUDESONIDE AND FORMOTEROL FUMARATE DIHYDRATE 2 PUFF(S): 160; 4.5 AEROSOL RESPIRATORY (INHALATION) at 09:56

## 2021-08-07 RX ADMIN — Medication 500 MILLIGRAM(S): at 10:00

## 2021-08-07 RX ADMIN — Medication 50 MILLIGRAM(S): at 21:06

## 2021-08-07 RX ADMIN — QUETIAPINE FUMARATE 150 MILLIGRAM(S): 200 TABLET, FILM COATED ORAL at 21:07

## 2021-08-07 RX ADMIN — Medication 50 MILLIGRAM(S): at 10:00

## 2021-08-07 RX ADMIN — Medication 400 UNIT(S): at 09:59

## 2021-08-07 RX ADMIN — Medication 325 MILLIGRAM(S): at 09:57

## 2021-08-07 RX ADMIN — Medication 1 TABLET(S): at 09:57

## 2021-08-07 RX ADMIN — TRAMADOL HYDROCHLORIDE 50 MILLIGRAM(S): 50 TABLET ORAL at 05:26

## 2021-08-07 RX ADMIN — ALBUTEROL 2 PUFF(S): 90 AEROSOL, METERED ORAL at 09:55

## 2021-08-07 RX ADMIN — AMLODIPINE BESYLATE 5 MILLIGRAM(S): 2.5 TABLET ORAL at 09:59

## 2021-08-07 RX ADMIN — CELECOXIB 100 MILLIGRAM(S): 200 CAPSULE ORAL at 21:07

## 2021-08-07 RX ADMIN — LORATADINE 10 MILLIGRAM(S): 10 TABLET ORAL at 09:58

## 2021-08-07 RX ADMIN — PREGABALIN 1000 MICROGRAM(S): 225 CAPSULE ORAL at 09:57

## 2021-08-07 RX ADMIN — PANTOPRAZOLE SODIUM 40 MILLIGRAM(S): 20 TABLET, DELAYED RELEASE ORAL at 08:11

## 2021-08-07 RX ADMIN — Medication 1 MILLIGRAM(S): at 09:59

## 2021-08-07 RX ADMIN — Medication 1 TABLET(S): at 21:07

## 2021-08-07 RX ADMIN — BUDESONIDE AND FORMOTEROL FUMARATE DIHYDRATE 2 PUFF(S): 160; 4.5 AEROSOL RESPIRATORY (INHALATION) at 21:10

## 2021-08-07 RX ADMIN — Medication 400 UNIT(S): at 21:07

## 2021-08-07 RX ADMIN — MIRTAZAPINE 45 MILLIGRAM(S): 45 TABLET, ORALLY DISINTEGRATING ORAL at 21:08

## 2021-08-07 RX ADMIN — ATORVASTATIN CALCIUM 10 MILLIGRAM(S): 80 TABLET, FILM COATED ORAL at 21:06

## 2021-08-07 RX ADMIN — CELECOXIB 100 MILLIGRAM(S): 200 CAPSULE ORAL at 10:45

## 2021-08-08 RX ADMIN — BUDESONIDE AND FORMOTEROL FUMARATE DIHYDRATE 2 PUFF(S): 160; 4.5 AEROSOL RESPIRATORY (INHALATION) at 09:42

## 2021-08-08 RX ADMIN — QUETIAPINE FUMARATE 150 MILLIGRAM(S): 200 TABLET, FILM COATED ORAL at 21:34

## 2021-08-08 RX ADMIN — LORATADINE 10 MILLIGRAM(S): 10 TABLET ORAL at 09:41

## 2021-08-08 RX ADMIN — ALBUTEROL 2 PUFF(S): 90 AEROSOL, METERED ORAL at 06:42

## 2021-08-08 RX ADMIN — Medication 400 UNIT(S): at 21:34

## 2021-08-08 RX ADMIN — CELECOXIB 100 MILLIGRAM(S): 200 CAPSULE ORAL at 21:34

## 2021-08-08 RX ADMIN — ALBUTEROL 2 PUFF(S): 90 AEROSOL, METERED ORAL at 18:58

## 2021-08-08 RX ADMIN — BUDESONIDE AND FORMOTEROL FUMARATE DIHYDRATE 2 PUFF(S): 160; 4.5 AEROSOL RESPIRATORY (INHALATION) at 21:35

## 2021-08-08 RX ADMIN — CELECOXIB 100 MILLIGRAM(S): 200 CAPSULE ORAL at 10:40

## 2021-08-08 RX ADMIN — Medication 500 MILLIGRAM(S): at 09:41

## 2021-08-08 RX ADMIN — Medication 400 UNIT(S): at 09:42

## 2021-08-08 RX ADMIN — Medication 1 TABLET(S): at 09:42

## 2021-08-08 RX ADMIN — Medication 1 TABLET(S): at 09:41

## 2021-08-08 RX ADMIN — CELECOXIB 100 MILLIGRAM(S): 200 CAPSULE ORAL at 09:40

## 2021-08-08 RX ADMIN — Medication 50 MILLIGRAM(S): at 09:42

## 2021-08-08 RX ADMIN — Medication 1 MILLIGRAM(S): at 09:42

## 2021-08-08 RX ADMIN — ATORVASTATIN CALCIUM 10 MILLIGRAM(S): 80 TABLET, FILM COATED ORAL at 21:34

## 2021-08-08 RX ADMIN — Medication 325 MILLIGRAM(S): at 09:41

## 2021-08-08 RX ADMIN — PREGABALIN 1000 MICROGRAM(S): 225 CAPSULE ORAL at 09:42

## 2021-08-08 RX ADMIN — CELECOXIB 100 MILLIGRAM(S): 200 CAPSULE ORAL at 22:30

## 2021-08-08 RX ADMIN — AMLODIPINE BESYLATE 5 MILLIGRAM(S): 2.5 TABLET ORAL at 09:41

## 2021-08-08 RX ADMIN — PANTOPRAZOLE SODIUM 40 MILLIGRAM(S): 20 TABLET, DELAYED RELEASE ORAL at 06:40

## 2021-08-08 RX ADMIN — Medication 50 MILLIGRAM(S): at 21:34

## 2021-08-08 RX ADMIN — TRAMADOL HYDROCHLORIDE 50 MILLIGRAM(S): 50 TABLET ORAL at 06:40

## 2021-08-08 RX ADMIN — Medication 1 TABLET(S): at 21:34

## 2021-08-08 RX ADMIN — CELECOXIB 100 MILLIGRAM(S): 200 CAPSULE ORAL at 06:36

## 2021-08-08 RX ADMIN — MIRTAZAPINE 45 MILLIGRAM(S): 45 TABLET, ORALLY DISINTEGRATING ORAL at 21:35

## 2021-08-09 VITALS
HEART RATE: 90 BPM | TEMPERATURE: 98 F | DIASTOLIC BLOOD PRESSURE: 87 MMHG | OXYGEN SATURATION: 94 % | SYSTOLIC BLOOD PRESSURE: 128 MMHG | RESPIRATION RATE: 18 BRPM

## 2021-08-09 PROCEDURE — 36415 COLL VENOUS BLD VENIPUNCTURE: CPT

## 2021-08-09 PROCEDURE — 87086 URINE CULTURE/COLONY COUNT: CPT

## 2021-08-09 PROCEDURE — 82728 ASSAY OF FERRITIN: CPT

## 2021-08-09 PROCEDURE — 93005 ELECTROCARDIOGRAM TRACING: CPT

## 2021-08-09 PROCEDURE — 71045 X-RAY EXAM CHEST 1 VIEW: CPT

## 2021-08-09 PROCEDURE — 83735 ASSAY OF MAGNESIUM: CPT

## 2021-08-09 PROCEDURE — 87635 SARS-COV-2 COVID-19 AMP PRB: CPT

## 2021-08-09 PROCEDURE — 94640 AIRWAY INHALATION TREATMENT: CPT

## 2021-08-09 PROCEDURE — 99238 HOSP IP/OBS DSCHRG MGMT 30/<: CPT

## 2021-08-09 PROCEDURE — 82962 GLUCOSE BLOOD TEST: CPT

## 2021-08-09 PROCEDURE — 83550 IRON BINDING TEST: CPT

## 2021-08-09 PROCEDURE — 84443 ASSAY THYROID STIM HORMONE: CPT

## 2021-08-09 PROCEDURE — 80307 DRUG TEST PRSMV CHEM ANLYZR: CPT

## 2021-08-09 PROCEDURE — 82607 VITAMIN B-12: CPT

## 2021-08-09 PROCEDURE — 85025 COMPLETE CBC W/AUTO DIFF WBC: CPT

## 2021-08-09 PROCEDURE — 83036 HEMOGLOBIN GLYCOSYLATED A1C: CPT

## 2021-08-09 PROCEDURE — 85027 COMPLETE CBC AUTOMATED: CPT

## 2021-08-09 PROCEDURE — 81003 URINALYSIS AUTO W/O SCOPE: CPT

## 2021-08-09 PROCEDURE — 86769 SARS-COV-2 COVID-19 ANTIBODY: CPT

## 2021-08-09 PROCEDURE — 80053 COMPREHEN METABOLIC PANEL: CPT

## 2021-08-09 PROCEDURE — 82746 ASSAY OF FOLIC ACID SERUM: CPT

## 2021-08-09 PROCEDURE — 83540 ASSAY OF IRON: CPT

## 2021-08-09 PROCEDURE — 80061 LIPID PANEL: CPT

## 2021-08-09 PROCEDURE — 99285 EMERGENCY DEPT VISIT HI MDM: CPT | Mod: 25

## 2021-08-09 RX ORDER — ATORVASTATIN CALCIUM 80 MG/1
1 TABLET, FILM COATED ORAL
Qty: 14 | Refills: 0
Start: 2021-08-09 | End: 2021-08-22

## 2021-08-09 RX ORDER — AMLODIPINE BESYLATE 2.5 MG/1
1 TABLET ORAL
Qty: 14 | Refills: 0
Start: 2021-08-09 | End: 2021-08-22

## 2021-08-09 RX ORDER — FERROUS SULFATE 325(65) MG
1 TABLET ORAL
Qty: 0 | Refills: 0 | DISCHARGE
Start: 2021-08-09

## 2021-08-09 RX ORDER — ALBUTEROL 90 UG/1
2 AEROSOL, METERED ORAL
Qty: 0 | Refills: 0 | DISCHARGE
Start: 2021-08-09

## 2021-08-09 RX ORDER — LORATADINE 10 MG/1
1 TABLET ORAL
Qty: 14 | Refills: 0
Start: 2021-08-09 | End: 2021-08-22

## 2021-08-09 RX ORDER — METOPROLOL TARTRATE 50 MG
1 TABLET ORAL
Qty: 14 | Refills: 0
Start: 2021-08-09 | End: 2021-08-22

## 2021-08-09 RX ORDER — ASCORBIC ACID 60 MG
1 TABLET,CHEWABLE ORAL
Qty: 14 | Refills: 0
Start: 2021-08-09 | End: 2021-08-22

## 2021-08-09 RX ORDER — PREGABALIN 225 MG/1
1 CAPSULE ORAL
Qty: 14 | Refills: 0
Start: 2021-08-09 | End: 2021-08-22

## 2021-08-09 RX ORDER — TRAMADOL HYDROCHLORIDE 50 MG/1
1 TABLET ORAL
Qty: 7 | Refills: 0
Start: 2021-08-09 | End: 2021-08-15

## 2021-08-09 RX ORDER — FOLIC ACID 0.8 MG
1 TABLET ORAL
Qty: 14 | Refills: 0
Start: 2021-08-09 | End: 2021-08-22

## 2021-08-09 RX ORDER — QUETIAPINE FUMARATE 200 MG/1
3 TABLET, FILM COATED ORAL
Qty: 42 | Refills: 0
Start: 2021-08-09 | End: 2021-08-22

## 2021-08-09 RX ORDER — ALBUTEROL 90 UG/1
2 AEROSOL, METERED ORAL
Qty: 112 | Refills: 0
Start: 2021-08-09 | End: 2021-08-22

## 2021-08-09 RX ORDER — MIRTAZAPINE 45 MG/1
1 TABLET, ORALLY DISINTEGRATING ORAL
Qty: 14 | Refills: 0
Start: 2021-08-09 | End: 2021-08-22

## 2021-08-09 RX ORDER — TRAZODONE HCL 50 MG
1 TABLET ORAL
Qty: 14 | Refills: 0
Start: 2021-08-09 | End: 2021-08-22

## 2021-08-09 RX ORDER — CALCIUM CARBONATE 500(1250)
1 TABLET ORAL
Qty: 28 | Refills: 0
Start: 2021-08-09 | End: 2021-08-22

## 2021-08-09 RX ORDER — FERROUS SULFATE 325(65) MG
1 TABLET ORAL
Qty: 14 | Refills: 0
Start: 2021-08-09 | End: 2021-08-22

## 2021-08-09 RX ORDER — PANTOPRAZOLE SODIUM 20 MG/1
1 TABLET, DELAYED RELEASE ORAL
Qty: 14 | Refills: 0
Start: 2021-08-09 | End: 2021-08-22

## 2021-08-09 RX ORDER — CELECOXIB 200 MG/1
1 CAPSULE ORAL
Qty: 28 | Refills: 0
Start: 2021-08-09 | End: 2021-08-22

## 2021-08-09 RX ORDER — AMLODIPINE BESYLATE 2.5 MG/1
1 TABLET ORAL
Qty: 0 | Refills: 0 | DISCHARGE
Start: 2021-08-09

## 2021-08-09 RX ORDER — CHOLECALCIFEROL (VITAMIN D3) 125 MCG
400 CAPSULE ORAL
Qty: 14 | Refills: 0
Start: 2021-08-09 | End: 2021-08-22

## 2021-08-09 RX ADMIN — Medication 325 MILLIGRAM(S): at 10:18

## 2021-08-09 RX ADMIN — Medication 500 MILLIGRAM(S): at 10:18

## 2021-08-09 RX ADMIN — Medication 400 UNIT(S): at 10:18

## 2021-08-09 RX ADMIN — Medication 1 TABLET(S): at 10:18

## 2021-08-09 RX ADMIN — TRAMADOL HYDROCHLORIDE 50 MILLIGRAM(S): 50 TABLET ORAL at 11:35

## 2021-08-09 RX ADMIN — Medication 1 MILLIGRAM(S): at 10:18

## 2021-08-09 RX ADMIN — AMLODIPINE BESYLATE 5 MILLIGRAM(S): 2.5 TABLET ORAL at 10:18

## 2021-08-09 RX ADMIN — PREGABALIN 1000 MICROGRAM(S): 225 CAPSULE ORAL at 10:18

## 2021-08-09 RX ADMIN — TRAMADOL HYDROCHLORIDE 50 MILLIGRAM(S): 50 TABLET ORAL at 04:35

## 2021-08-09 RX ADMIN — CELECOXIB 100 MILLIGRAM(S): 200 CAPSULE ORAL at 10:18

## 2021-08-09 RX ADMIN — LORATADINE 10 MILLIGRAM(S): 10 TABLET ORAL at 10:18

## 2021-08-09 RX ADMIN — PANTOPRAZOLE SODIUM 40 MILLIGRAM(S): 20 TABLET, DELAYED RELEASE ORAL at 10:18

## 2021-08-09 RX ADMIN — BUDESONIDE AND FORMOTEROL FUMARATE DIHYDRATE 2 PUFF(S): 160; 4.5 AEROSOL RESPIRATORY (INHALATION) at 11:36

## 2021-08-09 RX ADMIN — Medication 1 TABLET(S): at 11:35

## 2021-08-09 RX ADMIN — Medication 50 MILLIGRAM(S): at 10:18

## 2021-08-09 NOTE — PROGRESS NOTE BEHAVIORAL HEALTH - NSBHCHARTREVIEWVS_PSY_A_CORE FT
Vital Signs Last 24 Hrs  T(C): 36.8 (05 Aug 2021 20:37), Max: 36.9 (05 Aug 2021 16:33)  T(F): 98.2 (05 Aug 2021 20:37), Max: 98.5 (05 Aug 2021 16:33)  HR: 81 (06 Aug 2021 10:35) (72 - 85)  BP: 155/97 (06 Aug 2021 10:35) (155/97 - 173/77)  BP(mean): --  RR: 18 (06 Aug 2021 10:35) (16 - 18)  SpO2: 95% (06 Aug 2021 10:35) (93% - 95%)
Vital Signs Last 24 Hrs  T(C): 36.8 (04 Aug 2021 09:22), Max: 36.8 (03 Aug 2021 16:29)  T(F): 98.2 (04 Aug 2021 09:22), Max: 98.2 (03 Aug 2021 16:29)  HR: 80 (04 Aug 2021 09:22) (70 - 80)  BP: 148/93 (04 Aug 2021 09:22) (148/93 - 172/91)  BP(mean): --  RR: 18 (03 Aug 2021 20:23) (18 - 18)  SpO2: 94% (04 Aug 2021 09:22) (94% - 97%)
Vital Signs Last 24 Hrs  T(C): 36.8 (05 Aug 2021 20:37), Max: 36.9 (05 Aug 2021 16:33)  T(F): 98.2 (05 Aug 2021 20:37), Max: 98.5 (05 Aug 2021 16:33)  HR: 81 (06 Aug 2021 10:35) (72 - 85)  BP: 155/97 (06 Aug 2021 10:35) (155/97 - 173/77)  BP(mean): --  RR: 18 (06 Aug 2021 10:35) (16 - 18)  SpO2: 95% (06 Aug 2021 10:35) (93% - 95%)
Vital Signs Last 24 Hrs  T(C): 36.9 (05 Aug 2021 16:33), Max: 36.9 (05 Aug 2021 16:33)  T(F): 98.5 (05 Aug 2021 16:33), Max: 98.5 (05 Aug 2021 16:33)  HR: 85 (05 Aug 2021 16:33) (85 - 89)  BP: 163/90 (05 Aug 2021 16:33) (163/90 - 164/95)  BP(mean): --  RR: 17 (05 Aug 2021 16:33) (17 - 18)  SpO2: 94% (05 Aug 2021 16:33) (94% - 98%)

## 2021-08-09 NOTE — PROGRESS NOTE BEHAVIORAL HEALTH - SUMMARY
Patient is a 71y/o single, domiciled alone AAF with medical history significant for HTN, HLD, COPD, Chronic back pain s/p multiple spinal surgeries, former smoker. Had first dose of Moderna vaccine on 7/8/2021. Psychiatric hx significant for prior hospitalizations, last on [facility] 3/2017, diagnoses including MDD and anxiety, not currently in psychiatric treatment, but taking antidepressants etc prescribed by PCP. Patient denies any current legal issues. Patient self-presented to ED with c/o depression, poor appetite and weight loss. Admitted to [facility] voluntary status.  Patient reports 30lb weight loss in last several months, would like medications to be adjusted as well as get referral for psychiatrist and psychotherapist.    Patient restarted on home meds, reports that since admission, sleep and appetite have improved, as well as mood. Is anticipating aftercare and expeditious discharge from unit.
Patient is a 69y/o single, domiciled alone AAF with medical history significant for HTN, HLD, COPD, Chronic back pain s/p multiple spinal surgeries, former smoker. Had first dose of Moderna vaccine on 7/8/2021. Psychiatric hx significant for prior hospitalizations, last on [facility] 3/2017, diagnoses including MDD and anxiety, not currently in psychiatric treatment, but taking antidepressants etc prescribed by PCP. Patient denies any current legal issues. Patient self-presented to ED with c/o depression, poor appetite and weight loss. Admitted to [facility] voluntary status.  Patient reports 30lb weight loss in last several months, would like medications to be adjusted as well as get referral for psychiatrist and psychotherapist.    Patient restarted on home meds, reports that since admission, sleep and appetite have improved, as well as mood. Is anticipating aftercare and expeditious discharge from unit.
Patient is a 71y/o single, domiciled alone AAF with medical history significant for HTN, HLD, COPD, Chronic back pain s/p multiple spinal surgeries, former smoker. Had first dose of Moderna vaccine on 7/8/2021. Psychiatric hx significant for prior hospitalizations, last on [facility] 3/2017, diagnoses including MDD and anxiety, not currently in psychiatric treatment, but taking antidepressants etc prescribed by PCP. Patient denies any current legal issues. Patient self-presented to ED with c/o depression, poor appetite and weight loss. Admitted to [facility] voluntary status.  Patient reports 30lb weight loss in last several months, would like medications to be adjusted as well as get referral for psychiatrist and psychotherapist.    Patient restarted on home meds, reports that since admission, sleep and appetite have improved, as well as mood. No changes med to regimen. Is anticipating aftercare and expeditious discharge from unit.
Patient is a 69y/o single, domiciled alone AAF with medical history significant for HTN, HLD, COPD, Chronic back pain s/p multiple spinal surgeries, former smoker. Had first dose of Moderna vaccine on 7/8/2021. Psychiatric hx significant for prior hospitalizations, last on [facility] 3/2017, diagnoses including MDD and anxiety, not currently in psychiatric treatment, but taking antidepressants etc prescribed by PCP. Patient denies any current legal issues. Patient self-presented to ED with c/o depression, poor appetite and weight loss. Admitted to [facility] voluntary status.  Patient reports 30lb weight loss in last several months, would like medications to be adjusted as well as get referral for psychiatrist and psychotherapist.    Patient restarted on home meds, reports that since admission, sleep and appetite have improved, as well as mood. Is anticipating aftercare and expeditious discharge from unit.

## 2021-08-09 NOTE — PROGRESS NOTE BEHAVIORAL HEALTH - NSBHADMITCOUNSEL_PSY_A_CORE
risks and benefits of treatment options/importance of adherence to chosen treatment

## 2021-08-09 NOTE — PROGRESS NOTE BEHAVIORAL HEALTH - NSBHFUPINTERVALHXFT_PSY_A_CORE
Patient visible on the unit, expresses concern that her celebrex was not given to her at 1000 med pass, instead was given to her at 0700 in the morning. Focused on this throughout the day despite being told that timing would be fixed. Remains discharge focused, has no other concerns or complaints at this time. Looking forward to outpatient care and treatment. Sleep and appetite remain improved during admission
Patient visible on the unit, seated amongst peers in the milieu. Is pleasant and interactive upon approach. Reported that she had a fair weekend, happy with whoever the cook is in the kitchen as her appetite has improved and she has been eating well at each meal in addition to having her ensure. Feels that her mood as also improved, denying si/hi, a/v h or paranoid ideation. Future oriented, anticipating her upcoming appointments and looking forward to having someone to talk to regularly. Has no acute medical concerns. We discussed her medication regimen and pt feels comfortable with taking medications as prescribed and doesn't feel that she needs any help with organizing her meds. Is very much against her hha helping her stating that she is concerned that they will steal from her so she only allows them to do light housekeeping.   Per staff report pt has been pleasant, taking medications, visible and attending select groups.
Patient visible on the unit, seen seated by nursing station, humming to self. Cooperative, calm and pleasant on approach, seen in her room. She reports that since admission she has been sleeping the last couple of nights and have been eating at each meal. Appetite has returned and she has fully enjoyed her meals and the variety available to her. She requests to be discharged as soon as aftercare is secured for her 'I can follow up with outpatient care at this point.' Denies any thoughts of death, suicide or self harm. No a/v h or paranoid ideation. Also shares concern that the longer she stays in the hospital the higher the probability will be that she will lose some of her resources/support such as her meals on wheels and HHA 3x/week. Pleasant and thankful for care given thus far on unit. Notably focused on her clothing and having them laundered prior to discharge.  per staff report, pt is visible, taking meds, eating meals, slept last night, attending select groups.
Patient seen seated in front of nursing station observing the going ons of the unit. Pleasant upon approach. States that she would prefer discharge on Monday due to not getting meals on wheels on the weekend. Per staff report pt was noted to be anxious throughout the day yesterday and required seroquel 50mg at 0400 due to poor sleep. Pt denies having any issues with sleep and feels that her sleep has continued to improve during her admission.  Is focused on receiving her home advair that she states that she brought with her to the hospital, however it is not in her possession.   Medicine consult was contacted this morning for recs regarding pt's elevated bp and norvasc 5mg was started.

## 2021-08-09 NOTE — PROGRESS NOTE BEHAVIORAL HEALTH - PROBLEM SELECTOR PLAN 1
Remeron 45mg qhs  seroquel 150mg qhs  trazodone 50mg qhs

## 2021-08-09 NOTE — PROGRESS NOTE BEHAVIORAL HEALTH - PROBLEM SELECTOR PROBLEM 1
Current severe episode of major depressive disorder without psychotic features without prior episode

## 2021-08-09 NOTE — PROGRESS NOTE BEHAVIORAL HEALTH - OTHER
deferred; COVID precautions

## 2021-08-09 NOTE — PROGRESS NOTE BEHAVIORAL HEALTH - PROBLEM SELECTOR PLAN 4
Symbicort 160mg 2 puffs bid

## 2021-08-09 NOTE — PROGRESS NOTE BEHAVIORAL HEALTH - PROBLEM SELECTOR PLAN 5
Metoprolol 50mg daily
Metoprolol 50mg daily  Amlodipine 5mg daily
Metoprolol 50mg daily  Amlodipine 5mg daily
Metoprolol 50mg daily

## 2021-08-09 NOTE — PROGRESS NOTE BEHAVIORAL HEALTH - AXIS III
scoliosis, HTN, High hyperlipidemia COPD

## 2021-08-09 NOTE — PROGRESS NOTE BEHAVIORAL HEALTH - NSBHCHARTREVIEWLAB_PSY_A_CORE FT
08-03 Chol 111 LDL -- HDL 44<L> Trig 56  a1c 5.5  utox negative

## 2021-08-13 DIAGNOSIS — Z60.4 SOCIAL EXCLUSION AND REJECTION: ICD-10-CM

## 2021-08-13 DIAGNOSIS — G89.29 OTHER CHRONIC PAIN: ICD-10-CM

## 2021-08-13 DIAGNOSIS — J44.9 CHRONIC OBSTRUCTIVE PULMONARY DISEASE, UNSPECIFIED: ICD-10-CM

## 2021-08-13 DIAGNOSIS — R63.4 ABNORMAL WEIGHT LOSS: ICD-10-CM

## 2021-08-13 DIAGNOSIS — E78.5 HYPERLIPIDEMIA, UNSPECIFIED: ICD-10-CM

## 2021-08-13 DIAGNOSIS — F32.9 MAJOR DEPRESSIVE DISORDER, SINGLE EPISODE, UNSPECIFIED: ICD-10-CM

## 2021-08-13 DIAGNOSIS — M25.569 PAIN IN UNSPECIFIED KNEE: ICD-10-CM

## 2021-08-13 DIAGNOSIS — F41.9 ANXIETY DISORDER, UNSPECIFIED: ICD-10-CM

## 2021-08-13 DIAGNOSIS — Z60.2 PROBLEMS RELATED TO LIVING ALONE: ICD-10-CM

## 2021-08-13 DIAGNOSIS — Z87.891 PERSONAL HISTORY OF NICOTINE DEPENDENCE: ICD-10-CM

## 2021-08-13 DIAGNOSIS — F33.9 MAJOR DEPRESSIVE DISORDER, RECURRENT, UNSPECIFIED: ICD-10-CM

## 2021-08-13 DIAGNOSIS — I10 ESSENTIAL (PRIMARY) HYPERTENSION: ICD-10-CM

## 2021-08-13 DIAGNOSIS — R63.6 UNDERWEIGHT: ICD-10-CM

## 2021-08-13 DIAGNOSIS — M41.9 SCOLIOSIS, UNSPECIFIED: ICD-10-CM

## 2021-08-13 DIAGNOSIS — E87.6 HYPOKALEMIA: ICD-10-CM

## 2021-08-13 SDOH — SOCIAL STABILITY - SOCIAL INSECURITY: SOCIAL EXCLUSION AND REJECTION: Z60.4

## 2021-08-13 SDOH — SOCIAL STABILITY - SOCIAL INSECURITY: PROBLEMS RELATED TO LIVING ALONE: Z60.2

## 2021-08-19 DIAGNOSIS — Z71.89 OTHER SPECIFIED COUNSELING: ICD-10-CM

## 2021-12-01 PROCEDURE — G9005: CPT

## 2021-12-02 ENCOUNTER — INPATIENT (INPATIENT)
Facility: HOSPITAL | Age: 71
LOS: 6 days | Discharge: LEFT BEFORE DISCHARGE | DRG: 565 | End: 2021-12-09
Attending: HOSPITALIST | Admitting: STUDENT IN AN ORGANIZED HEALTH CARE EDUCATION/TRAINING PROGRAM
Payer: MEDICARE

## 2021-12-02 VITALS
OXYGEN SATURATION: 95 % | SYSTOLIC BLOOD PRESSURE: 105 MMHG | TEMPERATURE: 98 F | RESPIRATION RATE: 18 BRPM | WEIGHT: 134.04 LBS | DIASTOLIC BLOOD PRESSURE: 73 MMHG | HEIGHT: 65 IN | HEART RATE: 107 BPM

## 2021-12-02 DIAGNOSIS — Z98.89 OTHER SPECIFIED POSTPROCEDURAL STATES: Chronic | ICD-10-CM

## 2021-12-02 LAB
APTT BLD: 27.6 SEC — SIGNIFICANT CHANGE UP (ref 27.5–35.5)
BASOPHILS # BLD AUTO: 0.03 K/UL — SIGNIFICANT CHANGE UP (ref 0–0.2)
BASOPHILS NFR BLD AUTO: 0.3 % — SIGNIFICANT CHANGE UP (ref 0–2)
CALCIUM SERPL-MCNC: 9.6 MG/DL — SIGNIFICANT CHANGE UP (ref 8.4–10.5)
CO2 SERPL-SCNC: 27 MMOL/L — SIGNIFICANT CHANGE UP (ref 22–31)
CREAT SERPL-MCNC: 0.74 MG/DL — SIGNIFICANT CHANGE UP (ref 0.5–1.3)
EOSINOPHIL # BLD AUTO: 0.07 K/UL — SIGNIFICANT CHANGE UP (ref 0–0.5)
EOSINOPHIL NFR BLD AUTO: 0.8 % — SIGNIFICANT CHANGE UP (ref 0–6)
HCT VFR BLD CALC: 46.7 % — HIGH (ref 34.5–45)
HGB BLD-MCNC: 16.3 G/DL — HIGH (ref 11.5–15.5)
IMM GRANULOCYTES NFR BLD AUTO: 0.3 % — SIGNIFICANT CHANGE UP (ref 0–1.5)
INR BLD: 1.26 — HIGH (ref 0.88–1.16)
LYMPHOCYTES # BLD AUTO: 1.18 K/UL — SIGNIFICANT CHANGE UP (ref 1–3.3)
LYMPHOCYTES # BLD AUTO: 13.2 % — SIGNIFICANT CHANGE UP (ref 13–44)
MAGNESIUM SERPL-MCNC: 1.7 MG/DL — SIGNIFICANT CHANGE UP (ref 1.6–2.6)
MCHC RBC-ENTMCNC: 33.3 PG — SIGNIFICANT CHANGE UP (ref 27–34)
MCHC RBC-ENTMCNC: 34.9 GM/DL — SIGNIFICANT CHANGE UP (ref 32–36)
MCV RBC AUTO: 95.3 FL — SIGNIFICANT CHANGE UP (ref 80–100)
MONOCYTES # BLD AUTO: 1 K/UL — HIGH (ref 0–0.9)
MONOCYTES NFR BLD AUTO: 11.2 % — SIGNIFICANT CHANGE UP (ref 2–14)
NEUTROPHILS # BLD AUTO: 6.61 K/UL — SIGNIFICANT CHANGE UP (ref 1.8–7.4)
NEUTROPHILS NFR BLD AUTO: 74.2 % — SIGNIFICANT CHANGE UP (ref 43–77)
NRBC # BLD: 0 /100 WBCS — SIGNIFICANT CHANGE UP (ref 0–0)
PLATELET # BLD AUTO: 287 K/UL — SIGNIFICANT CHANGE UP (ref 150–400)
PROTHROM AB SERPL-ACNC: 14.9 SEC — HIGH (ref 10.6–13.6)
RBC # BLD: 4.9 M/UL — SIGNIFICANT CHANGE UP (ref 3.8–5.2)
RBC # FLD: 13 % — SIGNIFICANT CHANGE UP (ref 10.3–14.5)
TROPONIN T SERPL-MCNC: 0.01 NG/ML — SIGNIFICANT CHANGE UP (ref 0–0.01)
WBC # BLD: 8.92 K/UL — SIGNIFICANT CHANGE UP (ref 3.8–10.5)
WBC # FLD AUTO: 8.92 K/UL — SIGNIFICANT CHANGE UP (ref 3.8–10.5)

## 2021-12-02 PROCEDURE — 93010 ELECTROCARDIOGRAM REPORT: CPT

## 2021-12-02 PROCEDURE — 99285 EMERGENCY DEPT VISIT HI MDM: CPT

## 2021-12-02 RX ORDER — MORPHINE SULFATE 50 MG/1
2 CAPSULE, EXTENDED RELEASE ORAL ONCE
Refills: 0 | Status: DISCONTINUED | OUTPATIENT
Start: 2021-12-02 | End: 2021-12-02

## 2021-12-02 NOTE — ED ADULT NURSE NOTE - OBJECTIVE STATEMENT
Pt presented to the ED with complaints of weakness. As per pt, she has been weak for the past couple of days, with multiple falls, pt states she has hit her head twice, denies LOC or use of blood thinners. Pt states that at baseline, she walks with a cane. On arrival to ED, pt is alert and oriented, denies chest pain, SOB, palpitations, fever, nausea, vomiting, diarrhea, lightheadedness, or dizziness.

## 2021-12-02 NOTE — ED ADULT NURSE NOTE - NSICDXFAMILYHX_GEN_ALL_CORE_FT
FAMILY HISTORY:  Father  Still living? Unknown  Family history of heart disease, Age at diagnosis: Age Unknown    Mother  Still living? Unknown  Family history of CVA, Age at diagnosis: Age Unknown

## 2021-12-02 NOTE — ED ADULT TRIAGE NOTE - CHIEF COMPLAINT QUOTE
as per pt. she has been getting progressively weak over several days, she fell "the other day" and hit her head, she could not bear weight today.

## 2021-12-02 NOTE — ED ADULT NURSE NOTE - NSIMPLEMENTINTERV_GEN_ALL_ED
Implemented All Fall with Harm Risk Interventions:  [place] to call system. Call bell, personal items and telephone within reach. Instruct patient to call for assistance. Room bathroom lighting operational. Non-slip footwear when patient is off stretcher. Physically safe environment: no spills, clutter or unnecessary equipment. Stretcher in lowest position, wheels locked, appropriate side rails in place. Provide visual cue, wrist band, yellow gown, etc. Monitor gait and stability. Monitor for mental status changes and reorient to person, place, and time. Review medications for side effects contributing to fall risk. Reinforce activity limits and safety measures with patient and family. Provide visual clues: red socks.

## 2021-12-02 NOTE — ED ADULT NURSE NOTE - NSICDXPASTMEDICALHX_GEN_ALL_CORE_FT
PAST MEDICAL HISTORY:  Back pain     Depression     Emphysema lung     Hyperlipidemia     Hypertension     Hypokalemia     Knee pain, chronic     Lung nodules

## 2021-12-03 DIAGNOSIS — R53.1 WEAKNESS: ICD-10-CM

## 2021-12-03 DIAGNOSIS — Z79.899 OTHER LONG TERM (CURRENT) DRUG THERAPY: ICD-10-CM
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
## 2021-12-03 DIAGNOSIS — F32.9 MAJOR DEPRESSIVE DISORDER, SINGLE EPISODE, UNSPECIFIED: ICD-10-CM

## 2021-12-03 DIAGNOSIS — I10 ESSENTIAL (PRIMARY) HYPERTENSION: ICD-10-CM

## 2021-12-03 DIAGNOSIS — J43.9 EMPHYSEMA, UNSPECIFIED: ICD-10-CM

## 2021-12-03 DIAGNOSIS — E87.6 HYPOKALEMIA: ICD-10-CM

## 2021-12-03 DIAGNOSIS — R63.8 OTHER SYMPTOMS AND SIGNS CONCERNING FOOD AND FLUID INTAKE: ICD-10-CM

## 2021-12-03 DIAGNOSIS — K59.00 CONSTIPATION, UNSPECIFIED: ICD-10-CM

## 2021-12-03 LAB
ALBUMIN SERPL ELPH-MCNC: 4.3 G/DL — SIGNIFICANT CHANGE UP (ref 3.3–5)
ALP SERPL-CCNC: 92 U/L — SIGNIFICANT CHANGE UP (ref 40–120)
ALT FLD-CCNC: 66 U/L — HIGH (ref 10–45)
ANION GAP SERPL CALC-SCNC: 13 MMOL/L — SIGNIFICANT CHANGE UP (ref 5–17)
ANION GAP SERPL CALC-SCNC: 13 MMOL/L — SIGNIFICANT CHANGE UP (ref 5–17)
ANION GAP SERPL CALC-SCNC: 14 MMOL/L — SIGNIFICANT CHANGE UP (ref 5–17)
APPEARANCE UR: CLEAR — SIGNIFICANT CHANGE UP
AST SERPL-CCNC: 346 U/L — HIGH (ref 10–40)
BACTERIA # UR AUTO: SIGNIFICANT CHANGE UP /HPF
BILIRUB SERPL-MCNC: 1.4 MG/DL — HIGH (ref 0.2–1.2)
BILIRUB UR-MCNC: NEGATIVE — SIGNIFICANT CHANGE UP
BUN SERPL-MCNC: 4 MG/DL — LOW (ref 7–23)
BUN SERPL-MCNC: 4 MG/DL — LOW (ref 7–23)
BUN SERPL-MCNC: 6 MG/DL — LOW (ref 7–23)
CALCIUM SERPL-MCNC: 8.8 MG/DL — SIGNIFICANT CHANGE UP (ref 8.4–10.5)
CALCIUM SERPL-MCNC: 9.5 MG/DL — SIGNIFICANT CHANGE UP (ref 8.4–10.5)
CHLORIDE SERPL-SCNC: 103 MMOL/L — SIGNIFICANT CHANGE UP (ref 96–108)
CHLORIDE SERPL-SCNC: 106 MMOL/L — SIGNIFICANT CHANGE UP (ref 96–108)
CHLORIDE SERPL-SCNC: 108 MMOL/L — SIGNIFICANT CHANGE UP (ref 96–108)
CK SERPL-CCNC: CRITICAL HIGH U/L (ref 25–170)
CK SERPL-CCNC: CRITICAL HIGH U/L (ref 25–170)
CO2 SERPL-SCNC: 21 MMOL/L — LOW (ref 22–31)
CO2 SERPL-SCNC: 25 MMOL/L — SIGNIFICANT CHANGE UP (ref 22–31)
COLOR SPEC: YELLOW — SIGNIFICANT CHANGE UP
COMMENT - URINE: SIGNIFICANT CHANGE UP
CREAT SERPL-MCNC: 0.67 MG/DL — SIGNIFICANT CHANGE UP (ref 0.5–1.3)
CREAT SERPL-MCNC: 0.77 MG/DL — SIGNIFICANT CHANGE UP (ref 0.5–1.3)
DIFF PNL FLD: ABNORMAL
EPI CELLS # UR: SIGNIFICANT CHANGE UP /HPF (ref 0–5)
GLUCOSE SERPL-MCNC: 105 MG/DL — HIGH (ref 70–99)
GLUCOSE SERPL-MCNC: 99 MG/DL — SIGNIFICANT CHANGE UP (ref 70–99)
GLUCOSE SERPL-MCNC: 99 MG/DL — SIGNIFICANT CHANGE UP (ref 70–99)
GLUCOSE UR QL: NEGATIVE — SIGNIFICANT CHANGE UP
HCT VFR BLD CALC: 48.3 % — HIGH (ref 34.5–45)
HGB BLD-MCNC: 16.3 G/DL — HIGH (ref 11.5–15.5)
KETONES UR-MCNC: ABNORMAL MG/DL
LEUKOCYTE ESTERASE UR-ACNC: ABNORMAL
MCHC RBC-ENTMCNC: 32.7 PG — SIGNIFICANT CHANGE UP (ref 27–34)
MCHC RBC-ENTMCNC: 33.7 GM/DL — SIGNIFICANT CHANGE UP (ref 32–36)
MCV RBC AUTO: 97 FL — SIGNIFICANT CHANGE UP (ref 80–100)
NITRITE UR-MCNC: NEGATIVE — SIGNIFICANT CHANGE UP
NRBC # BLD: 0 /100 WBCS — SIGNIFICANT CHANGE UP (ref 0–0)
PH UR: 7 — SIGNIFICANT CHANGE UP (ref 5–8)
PHOSPHATE SERPL-MCNC: 2.7 MG/DL — SIGNIFICANT CHANGE UP (ref 2.5–4.5)
PLATELET # BLD AUTO: 269 K/UL — SIGNIFICANT CHANGE UP (ref 150–400)
POTASSIUM SERPL-MCNC: 2.7 MMOL/L — CRITICAL LOW (ref 3.5–5.3)
POTASSIUM SERPL-MCNC: 3.2 MMOL/L — LOW (ref 3.5–5.3)
POTASSIUM SERPL-MCNC: 3.5 MMOL/L — SIGNIFICANT CHANGE UP (ref 3.5–5.3)
POTASSIUM SERPL-SCNC: 2.7 MMOL/L — CRITICAL LOW (ref 3.5–5.3)
POTASSIUM SERPL-SCNC: 3.2 MMOL/L — LOW (ref 3.5–5.3)
POTASSIUM SERPL-SCNC: 3.5 MMOL/L — SIGNIFICANT CHANGE UP (ref 3.5–5.3)
PROT SERPL-MCNC: 7.4 G/DL — SIGNIFICANT CHANGE UP (ref 6–8.3)
PROT UR-MCNC: NEGATIVE MG/DL — SIGNIFICANT CHANGE UP
RBC # BLD: 4.98 M/UL — SIGNIFICANT CHANGE UP (ref 3.8–5.2)
RBC # FLD: 13.1 % — SIGNIFICANT CHANGE UP (ref 10.3–14.5)
RBC CASTS # UR COMP ASSIST: < 5 /HPF — SIGNIFICANT CHANGE UP
SARS-COV-2 RNA SPEC QL NAA+PROBE: NEGATIVE — SIGNIFICANT CHANGE UP
SODIUM SERPL-SCNC: 142 MMOL/L — SIGNIFICANT CHANGE UP (ref 135–145)
SODIUM SERPL-SCNC: 143 MMOL/L — SIGNIFICANT CHANGE UP (ref 135–145)
SODIUM SERPL-SCNC: 145 MMOL/L — SIGNIFICANT CHANGE UP (ref 135–145)
SP GR SPEC: 1.01 — SIGNIFICANT CHANGE UP (ref 1–1.03)
UROBILINOGEN FLD QL: 0.2 E.U./DL — SIGNIFICANT CHANGE UP
WBC # BLD: 8.29 K/UL — SIGNIFICANT CHANGE UP (ref 3.8–10.5)
WBC # FLD AUTO: 8.29 K/UL — SIGNIFICANT CHANGE UP (ref 3.8–10.5)
WBC UR QL: < 5 /HPF — SIGNIFICANT CHANGE UP

## 2021-12-03 PROCEDURE — 99223 1ST HOSP IP/OBS HIGH 75: CPT

## 2021-12-03 PROCEDURE — 72131 CT LUMBAR SPINE W/O DYE: CPT | Mod: 26,MA

## 2021-12-03 PROCEDURE — 70450 CT HEAD/BRAIN W/O DYE: CPT | Mod: 26,MA

## 2021-12-03 PROCEDURE — 93010 ELECTROCARDIOGRAM REPORT: CPT | Mod: 76

## 2021-12-03 PROCEDURE — 93306 TTE W/DOPPLER COMPLETE: CPT | Mod: 26

## 2021-12-03 PROCEDURE — 72125 CT NECK SPINE W/O DYE: CPT | Mod: 26,MA

## 2021-12-03 PROCEDURE — 73620 X-RAY EXAM OF FOOT: CPT | Mod: 26,LT,RT

## 2021-12-03 RX ORDER — MIRTAZAPINE 45 MG/1
45 TABLET, ORALLY DISINTEGRATING ORAL AT BEDTIME
Refills: 0 | Status: DISCONTINUED | OUTPATIENT
Start: 2021-12-03 | End: 2021-12-04

## 2021-12-03 RX ORDER — CELECOXIB 200 MG/1
100 CAPSULE ORAL
Refills: 0 | Status: DISCONTINUED | OUTPATIENT
Start: 2021-12-03 | End: 2021-12-09

## 2021-12-03 RX ORDER — PANTOPRAZOLE SODIUM 20 MG/1
40 TABLET, DELAYED RELEASE ORAL
Refills: 0 | Status: DISCONTINUED | OUTPATIENT
Start: 2021-12-04 | End: 2021-12-09

## 2021-12-03 RX ORDER — KETOROLAC TROMETHAMINE 30 MG/ML
15 SYRINGE (ML) INJECTION ONCE
Refills: 0 | Status: DISCONTINUED | OUTPATIENT
Start: 2021-12-03 | End: 2021-12-03

## 2021-12-03 RX ORDER — ENOXAPARIN SODIUM 100 MG/ML
40 INJECTION SUBCUTANEOUS EVERY 24 HOURS
Refills: 0 | Status: DISCONTINUED | OUTPATIENT
Start: 2021-12-03 | End: 2021-12-09

## 2021-12-03 RX ORDER — POTASSIUM CHLORIDE 20 MEQ
40 PACKET (EA) ORAL EVERY 4 HOURS
Refills: 0 | Status: COMPLETED | OUTPATIENT
Start: 2021-12-03 | End: 2021-12-03

## 2021-12-03 RX ORDER — FERROUS SULFATE 325(65) MG
325 TABLET ORAL DAILY
Refills: 0 | Status: DISCONTINUED | OUTPATIENT
Start: 2021-12-03 | End: 2021-12-09

## 2021-12-03 RX ORDER — PREGABALIN 225 MG/1
1000 CAPSULE ORAL DAILY
Refills: 0 | Status: DISCONTINUED | OUTPATIENT
Start: 2021-12-03 | End: 2021-12-09

## 2021-12-03 RX ORDER — POTASSIUM CHLORIDE 20 MEQ
40 PACKET (EA) ORAL ONCE
Refills: 0 | Status: COMPLETED | OUTPATIENT
Start: 2021-12-03 | End: 2021-12-03

## 2021-12-03 RX ORDER — CALCIUM CARBONATE 500(1250)
1 TABLET ORAL
Refills: 0 | Status: DISCONTINUED | OUTPATIENT
Start: 2021-12-03 | End: 2021-12-09

## 2021-12-03 RX ORDER — SOD,AMMONIUM,POTASSIUM LACTATE
1 CREAM (GRAM) TOPICAL ONCE
Refills: 0 | Status: COMPLETED | OUTPATIENT
Start: 2021-12-03 | End: 2021-12-03

## 2021-12-03 RX ORDER — SODIUM CHLORIDE 9 MG/ML
1000 INJECTION, SOLUTION INTRAVENOUS
Refills: 0 | Status: DISCONTINUED | OUTPATIENT
Start: 2021-12-03 | End: 2021-12-06

## 2021-12-03 RX ORDER — FOLIC ACID 0.8 MG
1 TABLET ORAL DAILY
Refills: 0 | Status: DISCONTINUED | OUTPATIENT
Start: 2021-12-03 | End: 2021-12-09

## 2021-12-03 RX ORDER — POTASSIUM CHLORIDE 20 MEQ
10 PACKET (EA) ORAL ONCE
Refills: 0 | Status: COMPLETED | OUTPATIENT
Start: 2021-12-03 | End: 2021-12-03

## 2021-12-03 RX ORDER — QUETIAPINE FUMARATE 200 MG/1
150 TABLET, FILM COATED ORAL AT BEDTIME
Refills: 0 | Status: DISCONTINUED | OUTPATIENT
Start: 2021-12-03 | End: 2021-12-04

## 2021-12-03 RX ORDER — ATORVASTATIN CALCIUM 80 MG/1
10 TABLET, FILM COATED ORAL AT BEDTIME
Refills: 0 | Status: DISCONTINUED | OUTPATIENT
Start: 2021-12-03 | End: 2021-12-09

## 2021-12-03 RX ORDER — LORATADINE 10 MG/1
10 TABLET ORAL DAILY
Refills: 0 | Status: DISCONTINUED | OUTPATIENT
Start: 2021-12-03 | End: 2021-12-09

## 2021-12-03 RX ORDER — CHOLECALCIFEROL (VITAMIN D3) 125 MCG
400 CAPSULE ORAL
Refills: 0 | Status: DISCONTINUED | OUTPATIENT
Start: 2021-12-03 | End: 2021-12-03

## 2021-12-03 RX ORDER — CHOLECALCIFEROL (VITAMIN D3) 125 MCG
400 CAPSULE ORAL EVERY 12 HOURS
Refills: 0 | Status: DISCONTINUED | OUTPATIENT
Start: 2021-12-03 | End: 2021-12-09

## 2021-12-03 RX ORDER — SENNA PLUS 8.6 MG/1
2 TABLET ORAL AT BEDTIME
Refills: 0 | Status: DISCONTINUED | OUTPATIENT
Start: 2021-12-03 | End: 2021-12-09

## 2021-12-03 RX ORDER — MAGNESIUM SULFATE 500 MG/ML
1 VIAL (ML) INJECTION ONCE
Refills: 0 | Status: COMPLETED | OUTPATIENT
Start: 2021-12-03 | End: 2021-12-03

## 2021-12-03 RX ORDER — ASCORBIC ACID 60 MG
500 TABLET,CHEWABLE ORAL DAILY
Refills: 0 | Status: DISCONTINUED | OUTPATIENT
Start: 2021-12-03 | End: 2021-12-09

## 2021-12-03 RX ORDER — IPRATROPIUM/ALBUTEROL SULFATE 18-103MCG
3 AEROSOL WITH ADAPTER (GRAM) INHALATION EVERY 4 HOURS
Refills: 0 | Status: DISCONTINUED | OUTPATIENT
Start: 2021-12-03 | End: 2021-12-08

## 2021-12-03 RX ADMIN — Medication 100 MILLIEQUIVALENT(S): at 00:47

## 2021-12-03 RX ADMIN — Medication 15 MILLIGRAM(S): at 04:50

## 2021-12-03 RX ADMIN — Medication 1 TABLET(S): at 12:46

## 2021-12-03 RX ADMIN — CELECOXIB 100 MILLIGRAM(S): 200 CAPSULE ORAL at 17:56

## 2021-12-03 RX ADMIN — PREGABALIN 1000 MICROGRAM(S): 225 CAPSULE ORAL at 12:46

## 2021-12-03 RX ADMIN — MIRTAZAPINE 45 MILLIGRAM(S): 45 TABLET, ORALLY DISINTEGRATING ORAL at 22:16

## 2021-12-03 RX ADMIN — ATORVASTATIN CALCIUM 10 MILLIGRAM(S): 80 TABLET, FILM COATED ORAL at 22:31

## 2021-12-03 RX ADMIN — CELECOXIB 100 MILLIGRAM(S): 200 CAPSULE ORAL at 18:20

## 2021-12-03 RX ADMIN — Medication 500 MILLIGRAM(S): at 12:46

## 2021-12-03 RX ADMIN — QUETIAPINE FUMARATE 150 MILLIGRAM(S): 200 TABLET, FILM COATED ORAL at 22:28

## 2021-12-03 RX ADMIN — SODIUM CHLORIDE 150 MILLILITER(S): 9 INJECTION, SOLUTION INTRAVENOUS at 17:57

## 2021-12-03 RX ADMIN — Medication 1 MILLIGRAM(S): at 12:46

## 2021-12-03 RX ADMIN — LORATADINE 10 MILLIGRAM(S): 10 TABLET ORAL at 12:57

## 2021-12-03 RX ADMIN — MORPHINE SULFATE 2 MILLIGRAM(S): 50 CAPSULE, EXTENDED RELEASE ORAL at 00:46

## 2021-12-03 RX ADMIN — Medication 100 GRAM(S): at 07:17

## 2021-12-03 RX ADMIN — SENNA PLUS 2 TABLET(S): 8.6 TABLET ORAL at 22:16

## 2021-12-03 RX ADMIN — Medication 1 APPLICATION(S): at 19:09

## 2021-12-03 RX ADMIN — Medication 40 MILLIEQUIVALENT(S): at 00:46

## 2021-12-03 RX ADMIN — Medication 400 UNIT(S): at 22:30

## 2021-12-03 RX ADMIN — ENOXAPARIN SODIUM 40 MILLIGRAM(S): 100 INJECTION SUBCUTANEOUS at 12:45

## 2021-12-03 RX ADMIN — Medication 1 TABLET(S): at 18:00

## 2021-12-03 RX ADMIN — Medication 325 MILLIGRAM(S): at 12:46

## 2021-12-03 RX ADMIN — Medication 40 MILLIEQUIVALENT(S): at 19:46

## 2021-12-03 RX ADMIN — MORPHINE SULFATE 2 MILLIGRAM(S): 50 CAPSULE, EXTENDED RELEASE ORAL at 01:10

## 2021-12-03 RX ADMIN — Medication 10 MILLIEQUIVALENT(S): at 01:50

## 2021-12-03 RX ADMIN — Medication 40 MILLIEQUIVALENT(S): at 17:57

## 2021-12-03 RX ADMIN — Medication 15 MILLIGRAM(S): at 04:08

## 2021-12-03 RX ADMIN — Medication 40 MILLIEQUIVALENT(S): at 12:46

## 2021-12-03 NOTE — H&P ADULT - ASSESSMENT
70 yo F with PMH of anxiety, MDD, HTN, high cholesterol, COPD, b/l ankle/knee fracture (4 years ago at [facility]), scoliosis, presenting for fall 2 days ago and leg weakness, found to have no intracranial findings on CT, possible polypharmacy, hypokalemia.   70 yo F with PMH of anxiety, MDD, HTN, high cholesterol, COPD, b/l ankle/knee fracture (4 years ago at [facility]), scoliosis, presenting for fall 2 days ago and leg weakness, found to have hypokalemia and polypharmacy.

## 2021-12-03 NOTE — H&P ADULT - NSHPPHYSICALEXAM_GEN_ALL_CORE
Vital Signs Last 24 Hrs  T(C): 37 (03 Dec 2021 07:35), Max: 37 (03 Dec 2021 07:35)  T(F): 98.6 (03 Dec 2021 07:35), Max: 98.6 (03 Dec 2021 07:35)  HR: 90 (03 Dec 2021 07:35) (90 - 107)  BP: 113/71 (03 Dec 2021 07:35) (104/58 - 144/93)  RR: 16 (03 Dec 2021 07:35) (16 - 18)  SpO2: 97% (03 Dec 2021 07:35) (95% - 97%)    VITALS:     GENERAL: NAD, lying in bed uncomfortable, low mood, psychomotor retardation  HEAD:  Atraumatic, Normocephalic  EYES: EOMI, PERRLA, conjunctiva and sclera clear  ENT: dry mucous membranes  CHEST/LUNG: Clear to auscultation bilaterally; No rales, rhonchi, wheezing, or rubs.   HEART: Regular rate and rhythm; No murmurs, rubs, or gallops  ABDOMEN: Bowel sounds present; soft, large abdomen, fullness in LLQ, non-tender.   EXTREMITIES:  2+ radial pulses, +1 DP pulses. toes are curled with bandaids, no active discharge.   NERVOUS SYSTEM:  Alert & Oriented X3, speech clear. No focal deficits.  MSK: 4/5 strength in b/l hips, 5/5 b/l knee and ankle strength. sensation intact in LE b/l.

## 2021-12-03 NOTE — H&P ADULT - PROBLEM SELECTOR PLAN 4
Patient with history of MDD, on Seroquel 150 mg at bedtime. Has not been taking medications, unclear reasoning, seems as though needed prescription to be filled. Endorses depressed mood, anhedonia, decreased PO intake, increased lethargy, psychomotor retardation.    - c/w quetiapine 150 mg at bedtime   - f/u outpatient PCP Patient with appropriate medications per [facility] list (quetiapine, trazodone, mirtazapine, loratadine), however currently with tramadol and on hypertensive medications although she was normotensive and had poor PO intake prior to admission.     - hold tramadol at present, discuss with PCP  - c/w tylenol 650 mg PRN for pain

## 2021-12-03 NOTE — H&P ADULT - NSHPSOCIALHISTORY_GEN_ALL_CORE
Patient lives alone, has HHA that comes during the week, ambulates with cane. Patient is former smoker, does not drink alcohol, no history of drug use.

## 2021-12-03 NOTE — H&P ADULT - PROBLEM SELECTOR PLAN 1
Patient presenting for fall with head trauma. Patient has had multiple falls, one fall 4 days ago and two falls the subsequent day. CT head, cervical, lumbar negative for any acute processes. On exam AAO x3, no evidence of focal deficits, no sensory deficits. Patient with worsening crowding of toes, no h/o seeing podiatrist.     - PT evaluation   - hold BP medications, as normotensive on presentation  - f/u orthostatic vitals    - avoid sedating agents, hold tramadol, tylenol PRN escalate as needed  - podiatry consult Patient hypokalemic to 2.8 on admission, has muscle weakness and felling of "legs giving out," i/s/o decreased PO intake. Muscle weakness typically occurs around K of 2.5, but can happen at higher values if acute. Evidence of myoglobinuria. No EKG changes, however does have prolonged QT.     - replete as K<4  - f/u EKG   - no evidence of arrythmia at present however continue to monitor

## 2021-12-03 NOTE — H&P ADULT - PROBLEM SELECTOR PLAN 7
Patient with history of COPD/emphysema. Has albuterol inhaler at home. No evidence of worsening respiratory status.     - duonebs PRN  - continue to monitor Patient with PMH of hypertension on amlodipine and metoprolol. Normotensive on admission. Unclear why patient is on metoprolol (does not state prior cardiac history).    - holding metoprolol and amlodipine i/s/o falls and weakness  - f/u outpatient provider as to why on metoprolol

## 2021-12-03 NOTE — PHYSICAL THERAPY INITIAL EVALUATION ADULT - PERTINENT HX OF CURRENT PROBLEM, REHAB EVAL
71F PMH of anxiety, MDD, HTN, high cholesterol, COPD, b/l ankle/knee fracture (4 years ago at [facility]), scoliosis, presenting for fall 2 days ago and leg weakness. Patient is a poor historian. Patient has been having leg weakness over past 5 days. Typically she ambulates with a cane. She has had episodes in which it feels like her "legs give out at the knee" which have led to falls in her apartment. first fall was 4 days ago, then had two falls the prev day. Fell backwards and hit her head.

## 2021-12-03 NOTE — H&P ADULT - PROBLEM SELECTOR PLAN 8
F: per PO   E: replete K < 4, Mg <2   N: DASH  DVT: lovenox  Dispo: RMF Patient with history of COPD/emphysema. Has albuterol inhaler at home. No evidence of worsening respiratory status.     - duonebs PRN  - continue to monitor

## 2021-12-03 NOTE — ED PROVIDER NOTE - CARE PLAN
1 Principal Discharge DX:	Fall  Secondary Diagnosis:	Back pain   Principal Discharge DX:	Fall  Secondary Diagnosis:	Back pain  Secondary Diagnosis:	Hypokalemia

## 2021-12-03 NOTE — ED PROVIDER NOTE - PROGRESS NOTE DETAILS
no fractures on CT, pt concerned she will fall again, states she has no help at home. will admit to medicine for PT evaluation

## 2021-12-03 NOTE — H&P ADULT - HISTORY OF PRESENT ILLNESS
Patient is a 72 yo F with PMH of anxiety, MDD, HTN, high cholesterol, COPD, b/l ankle/knee fracture (4 years ago at [facility]), scoliosis, presenting for fall 2 days ago and leg weakness. Patient is a poor historian. Patient has been having leg weakness over past 5 days. Typically she ambulates with a cane. She has had episodes in which it feels like her "legs give out at the knee" which have led to falls in her apartment. Her first fall was 4 days ago, then had two falls the subsequent day. Fell backwards and hit her head. Denies lightheadedness dizziness, or visual defects. She states that she has stopped taking her depression medication, for unclear reason, however is compliant with other medications. She takes tramadol for pain, does not take more than prescribed amount. She has started no new medications in past few days/weeks. Endorses worsening depression with low mood, anhedonia, changes in sleep, decreased appetite. She does not remember her last bowel movement. Endorses weight loss after "couple years." Endorses that she had appropriate cancer screening with PCP Dr. Verdugo. Denies fevers, chills, chest pain, abdominal pain, worsening back pain (has back pain at baseline), incontinence, paresthesia, numbness, tingling in extremities.     In ED:  Vitals T 98.1 /73  RR 18 95% on RA   Labs Hgb 16.3, K 2.7, AST//66    Patient is a 70 yo F with PMH of anxiety, MDD, HTN, high cholesterol, COPD, b/l ankle/knee fracture (4 years ago at [facility]), scoliosis, presenting for fall 2 days ago and leg weakness. Patient is a poor historian. Patient has been having leg weakness over past 5 days. Typically she ambulates with a cane. She has had episodes in which it feels like her "legs give out at the knee" which have led to falls in her apartment. Her first fall was 4 days ago, then had two falls the subsequent day. Fell backwards and hit her head. Denies lightheadedness dizziness, or visual defects. She states that she has stopped taking her depression medication, for unclear reason, however is compliant with other medications. She takes tramadol for pain, does not take more than prescribed amount. She has started no new medications in past few days/weeks. Endorses worsening depression with low mood, anhedonia, changes in sleep, decreased appetite. She does not remember her last bowel movement. Endorses weight loss after "couple years." Endorses that she had appropriate cancer screening with PCP Dr. Verdugo. Denies fevers, chills, chest pain, abdominal pain, worsening back pain (has back pain at baseline), incontinence, paresthesia, numbness, tingling in extremities.     In ED:  Vitals T 98.1 /73  RR 18 95% on RA   Labs Hgb 16.3, K 2.7, AST//66   Interventions: morphine 2 mg IV, potassium repletion, ketorolac 15 mg IV     Patient is a 72 yo F with PMH of anxiety, MDD, HTN, high cholesterol, COPD, b/l ankle/knee fracture (4 years ago at [facility]), scoliosis, presenting for fall 2 days ago and leg weakness. Patient is a poor historian. Patient has been having leg weakness over past 5 days. Typically she ambulates with a cane. She has had episodes in which it feels like her "legs give out at the knee" which have led to falls in her apartment. Her first fall was 4 days ago, then had two falls the subsequent day. Fell backwards and hit her head. Denies lightheadedness dizziness, or visual defects. She states that she has stopped taking her depression medication, for unclear reason, however is compliant with other medications. She takes tramadol for pain, does not take more than prescribed amount. She has started no new medications in past few days/weeks. Endorses worsening depression with low mood, anhedonia, changes in sleep, decreased appetite. She does not remember her last bowel movement. Endorses weight loss after "couple years." Endorses that she had appropriate cancer screening with PCP Dr. Verdugo. Denies fevers, chills, chest pain, abdominal pain, worsening back pain (has back pain at baseline), incontinence, paresthesia, numbness, tingling in extremities.     In ED:  Vitals T 98.1 /73  RR 18 95% on RA   Labs Hgb 16.3, K 2.7, AST//66   Interventions: morphine 2 mg IV, potassium repletion, ketorolac 15 mg IV  EKG: tachycardic, sinus, QT prolonged to 526

## 2021-12-03 NOTE — ED PROVIDER NOTE - CLINICAL SUMMARY MEDICAL DECISION MAKING FREE TEXT BOX
s/p multiple falls, pt states unable to hold self up, pain to back with standing. sensation intact and distally no weakness  -check labs  -ekg  -ct  -morphine

## 2021-12-03 NOTE — ED PROVIDER NOTE - OBJECTIVE STATEMENT
71F hx anxiety, htn, high chol, copd, c/o falls. pt states over past 3 days she has been unable to walk. states typically uses a cane. states her legs gave out and she fell backwards and hit her head.  states she then fell again the next day. states has chronic lower back pain.  pt states has hx of scoliosis and surgery, takes tramadol and celebrex for pain.  states no numbness. no incontinence.  states legs just feel like they cannot support her currently. no fevers. no abd pain.

## 2021-12-03 NOTE — H&P ADULT - PROBLEM SELECTOR PLAN 5
Patient does not remember last BM, has poor PO intake. Abdominal exam with no tenderness but fullness. Evidence of stool burden on CT.     - c/w bowel regimen of senna, miralax Patient with history of MDD, on Seroquel 150 mg at bedtime. Has not been taking medications, unclear reasoning, seems as though needed prescription to be filled. Endorses depressed mood, anhedonia, decreased PO intake, increased lethargy, psychomotor retardation.    - c/w quetiapine 150 mg at bedtime   - f/u outpatient PCP

## 2021-12-03 NOTE — H&P ADULT - PROBLEM SELECTOR PLAN 3
Patient with appropriate medications per [facility] list (quetiapine, trazodone, mirtazapine, loratadine), however currently with tramadol and on hypertensive medications although she was normotensive and had poor PO intake prior to admission.     - hold tramadol at present, discuss with PCP  - c/w tylenol 650 mg PRN for pain Patient presenting for progressive weakness and acute weakness that precedes falls. Patient endorses poorer PO intake, weight loss, constipation. Appears dehydrated on labs and PE. Hypokalemia to 2.7. Has history of depression and endorses worsening mood affect. Mild psychomotor retardation. Weakness likely 2/2 to poor PO intake i/s/o worsening depression. UA with blood, no RBCs, indicating muscle breakdown.    - f/u CK   - PT evaluation   - hold BP medications, as normotensive on presentation   - avoid sedating agents, hold tramadol, tylenol PRN escalate as needed  - restart psychiatric medications  - replete electrolytes   - encourage PO fluid intake  - f/u TSH, free T4

## 2021-12-03 NOTE — H&P ADULT - NSHPLABSRESULTS_GEN_ALL_CORE
Labs                        16.3   8.29  )-----------( 269      ( 03 Dec 2021 08:05 )             48.3     145  |  106  |  4<L>  ----------------------------<  99  3.2<L>   |  25  |  0.77    Ca    9.5      03 Dec 2021 08:05  Phos  2.7     12-03  Mg     1.7     12-02    TPro  7.4  /  Alb  4.3  /  TBili  1.4<H>  /  DBili  x   /  AST  346<H>  /  ALT  66<H>  /  AlkPhos  92  12-02    PT/INR - ( 02 Dec 2021 23:34 )   PT: 14.9 sec;   INR: 1.26          PTT - ( 02 Dec 2021 23:34 )  PTT:27.6 sec    RADIOLOGY    CT head & cervical spine IMPRESSION:    1.  No acute intracranial hemorrhage or calvarial fracture.  2.  Chronic microvascular ischemic disease.    CT Lumbar IMPRESSION:    1.  No acute bony fracture or traumatic malalignment.  2.  Bamboo spine appearance which could be seen with ankylosing spondylitis.  3.  Multilevel degenerative changes.

## 2021-12-03 NOTE — H&P ADULT - PROBLEM SELECTOR PLAN 6
Patient with PMH of hypertension on amlodipine and metoprolol. Normotensive on admission.     - holding medications i/s/o falls and weakness Patient does not remember last BM, has poor PO intake. Abdominal exam with no tenderness but fullness. Evidence of stool burden on CT.     - c/w bowel regimen of senna, miralax

## 2021-12-03 NOTE — PHYSICAL THERAPY INITIAL EVALUATION ADULT - ADDITIONAL COMMENTS
Pt states she has a HHA 3 days 3 hours, pt uses a cane at baseline (unable to find cane at bedside), Pt lives in elevator building

## 2021-12-03 NOTE — ED PROVIDER NOTE - PHYSICAL EXAMINATION
diffuse lower back ttp  no stepoff  5/5 great toe flex/ext b/l  5/5 foot flex/ext b/l  able to raise both legs against gravity, however pain limited  no swelling  no redness

## 2021-12-03 NOTE — H&P ADULT - ATTENDING COMMENTS
Fall Unlikely cradiogenic/Neurogenic cause  check orthostatics  Bowel regimen for constipation  correct K  PT eval and discharge planning Fall Unlikely cradiogenic/Neurogenic cause  check orthostatics  Bowel regimen for constipation  cont ivf for rhabdomyolysis  correct K  PT eval and discharge planning Fall Unlikely cradiogenic/Neurogenic cause  check orthostatics  Bowel regimen for constipation  cont ivf for rhabdomyolysis  correct K  Functional quadriplegia PPS<30%  PT eval and discharge planning Fall Unlikely cradiogenic/Neurogenic cause  check orthostatics  Bowel regimen for constipation  cont ivf for rhabdomyolysis  correct K  Functional quadriplegia PPS<30%  PT eval and discharge planning      addendum  Pt with functional quadriplegia, Only generalised weakness Fall Unlikely cradiogenic/Neurogenic cause  check orthostatics  Bowel regimen for constipation  cont ivf for rhabdomyolysis  correct K  Functional quadriplegia PPS<30%  PT eval and discharge planning      addendum  Pt without functional quadriplegia, Only generalised weakness

## 2021-12-03 NOTE — H&P ADULT - PROBLEM SELECTOR PLAN 2
Patient presenting for progressive weakness and acute weakness that precedes falls. Patient endorses poorer PO intake, weight loss, constipation. Appears dehydrated on labs and PE. Hypokalemia to 2.7. Has history of depression and endorses worsening mood affect. Mild psychomotor retardation. Weakness likely 2/2 to poor PO intake i/s/o worsening depression.     - PT evaluation   - hold BP medications, as normotensive on presentation   - avoid sedating agents, hold tramadol, tylenol PRN escalate as needed  - restart psychiatric medications  - replete electrolytes   - encourage PO fluid intake Patient presenting for progressive weakness and acute weakness that precedes falls. Patient endorses poorer PO intake, weight loss, constipation. Appears dehydrated on labs and PE. Hypokalemia to 2.7. Has history of depression and endorses worsening mood affect. Mild psychomotor retardation. Weakness likely 2/2 to poor PO intake i/s/o worsening depression. UA with blood, no RBCs, indicating muscle breakdown.    - f/u CK   - PT evaluation   - hold BP medications, as normotensive on presentation   - avoid sedating agents, hold tramadol, tylenol PRN escalate as needed  - restart psychiatric medications  - replete electrolytes   - encourage PO fluid intake Patient presenting for fall with head trauma. Patient has had multiple falls, one fall 4 days ago and two falls the subsequent day. CT head, cervical, lumbar negative for any acute processes. On exam AAO x3, no evidence of focal deficits, no sensory deficits. Patient with worsening crowding of toes, no h/o seeing podiatrist. Likely 2/2 to hypokalemia vs. polypharmacy vs. muscle weakness/problems ambulating, less likely neurogenic or cardiogenic as no acute intracranial findings or cardiac symptoms.     - PT evaluation   - hold BP medications, as normotensive on presentation  - f/u orthostatic vitals    - avoid sedating agents, hold tramadol, tylenol PRN escalate as needed  - podiatry consult

## 2021-12-03 NOTE — PATIENT PROFILE ADULT - FALL HARM RISK - HARM RISK INTERVENTIONS

## 2021-12-04 DIAGNOSIS — M62.82 RHABDOMYOLYSIS: ICD-10-CM

## 2021-12-04 DIAGNOSIS — I47.1 SUPRAVENTRICULAR TACHYCARDIA: ICD-10-CM

## 2021-12-04 LAB
ALBUMIN SERPL ELPH-MCNC: 3.5 G/DL — SIGNIFICANT CHANGE UP (ref 3.3–5)
ALP SERPL-CCNC: 67 U/L — SIGNIFICANT CHANGE UP (ref 40–120)
ALT FLD-CCNC: 71 U/L — HIGH (ref 10–45)
ANION GAP SERPL CALC-SCNC: 11 MMOL/L — SIGNIFICANT CHANGE UP (ref 5–17)
AST SERPL-CCNC: 253 U/L — HIGH (ref 10–40)
BASOPHILS # BLD AUTO: 0.06 K/UL — SIGNIFICANT CHANGE UP (ref 0–0.2)
BASOPHILS NFR BLD AUTO: 1 % — SIGNIFICANT CHANGE UP (ref 0–2)
BILIRUB SERPL-MCNC: 1 MG/DL — SIGNIFICANT CHANGE UP (ref 0.2–1.2)
BUN SERPL-MCNC: 5 MG/DL — LOW (ref 7–23)
CALCIUM SERPL-MCNC: 9 MG/DL — SIGNIFICANT CHANGE UP (ref 8.4–10.5)
CHLORIDE SERPL-SCNC: 111 MMOL/L — HIGH (ref 96–108)
CK SERPL-CCNC: 9624 U/L — HIGH (ref 25–170)
CO2 SERPL-SCNC: 23 MMOL/L — SIGNIFICANT CHANGE UP (ref 22–31)
CREAT SERPL-MCNC: 0.7 MG/DL — SIGNIFICANT CHANGE UP (ref 0.5–1.3)
CULTURE RESULTS: SIGNIFICANT CHANGE UP
EOSINOPHIL # BLD AUTO: 0.34 K/UL — SIGNIFICANT CHANGE UP (ref 0–0.5)
EOSINOPHIL NFR BLD AUTO: 5.7 % — SIGNIFICANT CHANGE UP (ref 0–6)
FOLATE SERPL-MCNC: 15.6 NG/ML — SIGNIFICANT CHANGE UP
GLUCOSE SERPL-MCNC: 105 MG/DL — HIGH (ref 70–99)
HCT VFR BLD CALC: 41.8 % — SIGNIFICANT CHANGE UP (ref 34.5–45)
HCV AB S/CO SERPL IA: 0.04 S/CO — SIGNIFICANT CHANGE UP
HCV AB SERPL-IMP: SIGNIFICANT CHANGE UP
HGB BLD-MCNC: 14 G/DL — SIGNIFICANT CHANGE UP (ref 11.5–15.5)
IMM GRANULOCYTES NFR BLD AUTO: 0.5 % — SIGNIFICANT CHANGE UP (ref 0–1.5)
LYMPHOCYTES # BLD AUTO: 1.84 K/UL — SIGNIFICANT CHANGE UP (ref 1–3.3)
LYMPHOCYTES # BLD AUTO: 31 % — SIGNIFICANT CHANGE UP (ref 13–44)
MAGNESIUM SERPL-MCNC: 1.6 MG/DL — SIGNIFICANT CHANGE UP (ref 1.6–2.6)
MCHC RBC-ENTMCNC: 32.5 PG — SIGNIFICANT CHANGE UP (ref 27–34)
MCHC RBC-ENTMCNC: 33.5 GM/DL — SIGNIFICANT CHANGE UP (ref 32–36)
MCV RBC AUTO: 97 FL — SIGNIFICANT CHANGE UP (ref 80–100)
MONOCYTES # BLD AUTO: 0.73 K/UL — SIGNIFICANT CHANGE UP (ref 0–0.9)
MONOCYTES NFR BLD AUTO: 12.3 % — SIGNIFICANT CHANGE UP (ref 2–14)
NEUTROPHILS # BLD AUTO: 2.94 K/UL — SIGNIFICANT CHANGE UP (ref 1.8–7.4)
NEUTROPHILS NFR BLD AUTO: 49.5 % — SIGNIFICANT CHANGE UP (ref 43–77)
NRBC # BLD: 0 /100 WBCS — SIGNIFICANT CHANGE UP (ref 0–0)
NT-PROBNP SERPL-SCNC: 165 PG/ML — SIGNIFICANT CHANGE UP (ref 0–300)
PHOSPHATE SERPL-MCNC: 1.9 MG/DL — LOW (ref 2.5–4.5)
PLATELET # BLD AUTO: 244 K/UL — SIGNIFICANT CHANGE UP (ref 150–400)
POTASSIUM SERPL-MCNC: 3.9 MMOL/L — SIGNIFICANT CHANGE UP (ref 3.5–5.3)
POTASSIUM SERPL-SCNC: 3.9 MMOL/L — SIGNIFICANT CHANGE UP (ref 3.5–5.3)
PROT SERPL-MCNC: 6.1 G/DL — SIGNIFICANT CHANGE UP (ref 6–8.3)
RBC # BLD: 4.31 M/UL — SIGNIFICANT CHANGE UP (ref 3.8–5.2)
RBC # FLD: 13.3 % — SIGNIFICANT CHANGE UP (ref 10.3–14.5)
SODIUM SERPL-SCNC: 145 MMOL/L — SIGNIFICANT CHANGE UP (ref 135–145)
SPECIMEN SOURCE: SIGNIFICANT CHANGE UP
TSH SERPL-MCNC: 1.9 UIU/ML — SIGNIFICANT CHANGE UP (ref 0.27–4.2)
VIT B12 SERPL-MCNC: 934 PG/ML — SIGNIFICANT CHANGE UP (ref 232–1245)
WBC # BLD: 5.94 K/UL — SIGNIFICANT CHANGE UP (ref 3.8–10.5)
WBC # FLD AUTO: 5.94 K/UL — SIGNIFICANT CHANGE UP (ref 3.8–10.5)

## 2021-12-04 PROCEDURE — 99232 SBSQ HOSP IP/OBS MODERATE 35: CPT

## 2021-12-04 PROCEDURE — 51702 INSERT TEMP BLADDER CATH: CPT

## 2021-12-04 PROCEDURE — 93010 ELECTROCARDIOGRAM REPORT: CPT | Mod: 76

## 2021-12-04 RX ORDER — SOD,AMMONIUM,POTASSIUM LACTATE
1 CREAM (GRAM) TOPICAL
Refills: 0 | Status: DISCONTINUED | OUTPATIENT
Start: 2021-12-05 | End: 2021-12-09

## 2021-12-04 RX ORDER — POLYETHYLENE GLYCOL 3350 17 G/17G
17 POWDER, FOR SOLUTION ORAL EVERY 24 HOURS
Refills: 0 | Status: DISCONTINUED | OUTPATIENT
Start: 2021-12-04 | End: 2021-12-09

## 2021-12-04 RX ORDER — POTASSIUM PHOSPHATE, MONOBASIC POTASSIUM PHOSPHATE, DIBASIC 236; 224 MG/ML; MG/ML
15 INJECTION, SOLUTION INTRAVENOUS ONCE
Refills: 0 | Status: COMPLETED | OUTPATIENT
Start: 2021-12-04 | End: 2021-12-04

## 2021-12-04 RX ORDER — MAGNESIUM SULFATE 500 MG/ML
4 VIAL (ML) INJECTION ONCE
Refills: 0 | Status: COMPLETED | OUTPATIENT
Start: 2021-12-04 | End: 2021-12-04

## 2021-12-04 RX ADMIN — Medication 1 MILLIGRAM(S): at 12:00

## 2021-12-04 RX ADMIN — SODIUM CHLORIDE 150 MILLILITER(S): 9 INJECTION, SOLUTION INTRAVENOUS at 11:48

## 2021-12-04 RX ADMIN — LORATADINE 10 MILLIGRAM(S): 10 TABLET ORAL at 12:00

## 2021-12-04 RX ADMIN — ATORVASTATIN CALCIUM 10 MILLIGRAM(S): 80 TABLET, FILM COATED ORAL at 23:32

## 2021-12-04 RX ADMIN — PANTOPRAZOLE SODIUM 40 MILLIGRAM(S): 20 TABLET, DELAYED RELEASE ORAL at 06:08

## 2021-12-04 RX ADMIN — ENOXAPARIN SODIUM 40 MILLIGRAM(S): 100 INJECTION SUBCUTANEOUS at 12:00

## 2021-12-04 RX ADMIN — Medication 1 TABLET(S): at 12:00

## 2021-12-04 RX ADMIN — CELECOXIB 100 MILLIGRAM(S): 200 CAPSULE ORAL at 18:04

## 2021-12-04 RX ADMIN — Medication 100 GRAM(S): at 17:30

## 2021-12-04 RX ADMIN — SENNA PLUS 2 TABLET(S): 8.6 TABLET ORAL at 23:33

## 2021-12-04 RX ADMIN — Medication 400 UNIT(S): at 09:39

## 2021-12-04 RX ADMIN — POLYETHYLENE GLYCOL 3350 17 GRAM(S): 17 POWDER, FOR SOLUTION ORAL at 18:06

## 2021-12-04 RX ADMIN — CELECOXIB 100 MILLIGRAM(S): 200 CAPSULE ORAL at 19:00

## 2021-12-04 RX ADMIN — Medication 500 MILLIGRAM(S): at 12:00

## 2021-12-04 RX ADMIN — PREGABALIN 1000 MICROGRAM(S): 225 CAPSULE ORAL at 12:00

## 2021-12-04 RX ADMIN — POTASSIUM PHOSPHATE, MONOBASIC POTASSIUM PHOSPHATE, DIBASIC 62.5 MILLIMOLE(S): 236; 224 INJECTION, SOLUTION INTRAVENOUS at 17:29

## 2021-12-04 RX ADMIN — Medication 325 MILLIGRAM(S): at 12:00

## 2021-12-04 RX ADMIN — Medication 1 TABLET(S): at 06:08

## 2021-12-04 RX ADMIN — CELECOXIB 100 MILLIGRAM(S): 200 CAPSULE ORAL at 06:08

## 2021-12-04 RX ADMIN — Medication 1 TABLET(S): at 18:03

## 2021-12-04 RX ADMIN — Medication 400 UNIT(S): at 21:12

## 2021-12-04 NOTE — PROGRESS NOTE ADULT - ATTENDING COMMENTS
Pt denies any complaints today and speaks with weak voice. Foleys was placed for urinary retention  she is tachycardic but denies chest pain/shortness of breath/leg pain  exam shows normal heart and lung exam and No calf tenderness    Plan   ekg and evaluate for arrythmia  low probability for PE/ No cardiac problems /unlikely sepsis related  ivf and monitor for rhabdo and monitor for fluid overload  TOV and d/c foleys  Fall Unlikely cardiogenic/Neurogenic cause  check orthostatics  Bowel regimen for constipation  Hypokalemia resolved  Hold psych meds for now due to risk of sedation  PT eval and discharge planning Pt denies any complaints today and speaks with weak voice. Foleys was placed for urinary retention  she is tachycardic but denies chest pain/shortness of breath/leg pain  exam shows normal heart and lung exam and No calf tenderness    Plan   ekg and evaluate for arrythmia  low probability for PE/ No cardiac problems /unlikely sepsis related  ivf and monitor for rhabdo and monitor for fluid overload  TOV and d/c foleys  Fall Unlikely cardiogenic/Neurogenic cause  check orthostatics  Bowel regimen for constipation  Hypokalemia resolved  Hold psych meds for now due to risk of sedation  Functional Quadriplegia PPS<30%  PT eval and discharge planning Pt denies any complaints today and speaks with weak voice. Foleys was placed for urinary retention  she is tachycardic but denies chest pain/shortness of breath/leg pain  exam shows normal heart and lung exam and No calf tenderness    Plan   ekg and evaluate for arrythmia  low probability for PE/ No cardiac problems /unlikely sepsis related  ivf and monitor for rhabdo and monitor for fluid overload  TOV and d/c foleys  Fall Unlikely cardiogenic/Neurogenic cause  check orthostatics  Bowel regimen for constipation  Hypokalemia resolved  Hold psych meds for now due to risk of sedation  Functional Quadriplegia PPS<30%  PT eval and discharge planning      addendum  Pt with functional quadriplegia, Only generalised weakness Pt denies any complaints today and speaks with weak voice. Foleys was placed for urinary retention  she is tachycardic but denies chest pain/shortness of breath/leg pain  exam shows normal heart and lung exam and No calf tenderness    Plan   ekg and evaluate for arrythmia  low probability for PE/ No cardiac problems /unlikely sepsis related  ivf and monitor for rhabdo and monitor for fluid overload  TOV and d/c foleys  Fall Unlikely cardiogenic/Neurogenic cause  check orthostatics  Bowel regimen for constipation  Hypokalemia resolved  Hold psych meds for now due to risk of sedation  Functional Quadriplegia PPS<30%  PT eval and discharge planning      addendum  Pt without functional quadriplegia, Only generalised weakness

## 2021-12-04 NOTE — CONSULT NOTE ADULT - ASSESSMENT
70 yo F with PMH of anxiety, MDD, HTN, high cholesterol, COPD, b/l ankle/knee fracture (4 years ago at [facility]), scoliosis, presenting for fall 2 days ago and leg weakness, found to have hypokalemia (now resolved), rhabdomyolysis (resolving). Cardiology was consulted for sinus tachycardia upto 130s.     # Tachycardia  EKG 1:  Sinus tachycardia with APCs @132 bpm  EKG 2: sinus tachycardia @112 bpm, no APCs  Recent HRs in 90-100s  Patient denies any complains. No CP, SOB, lightheadedness.  -cho 12/03/21:  1. Technically difficult study. Nrmal left ventricular size.Mild symmetric left ventricular hypertrophy. Hyperdynamic left ventricular systolic function.Probably normal right ventricular systolic function.  Patient was tachycardic during the study.Very limited valvular assessment. PASP 31 mmHg. No effusion   - please treat the underlying causes of sinus tachycardia  - assess for s&s of infection, dehydration in a setting of rhabdo, send TSH, correct electrolytes (K, Mg, Phos is low), access the medications that can cause tachycardia (patient is on duo nebs), access for pain, if persistently tachycardic obtain CT-PE.    We will follow   
70 yo F with PMH of anxiety, MDD, HTN, high cholesterol, COPD, b/l ankle/knee fracture (4 years ago at [facility]), scoliosis, presenting for fall 2 days ago and leg weakness. Patient is a poor historian. Patient has been having leg weakness over past 5 days. Podiatry was consulted for elongated, thickened nails and hammering of the digits 2-5 b/l.     Plan:  - Ordered Ammonium Lactate 12% cream for b/l feet- please leave at bedside  - Ordered B/L foot xrays   - Plan for bedside nail debridement tomorrow  - Rest of plan per primary team    Podiatry following 
per Internal Medicine     72 yo F with PMH of anxiety, MDD, HTN, high cholesterol, COPD, b/l ankle/knee fracture (4 years ago at [facility]), scoliosis, presenting for fall 2 days ago and leg weakness, found to have hypokalemia and polypharmacy.     Problem/Plan - 1:  ·  Problem: Hypokalemia.   ·  Plan: Patient hypokalemic to 2.8 on admission, has muscle weakness and felling of "legs giving out," i/s/o decreased PO intake. Muscle weakness typically occurs around K of 2.5, but can happen at higher values if acute. Evidence of myoglobinuria. No EKG changes, however does have prolonged QT.     - replete as K<4  - f/u EKG   - no evidence of arrythmia at present however continue to monitor.    Problem/Plan - 2:  ·  Problem: Fall.   ·  Plan: Patient presenting for fall with head trauma. Patient has had multiple falls, one fall 4 days ago and two falls the subsequent day. CT head, cervical, lumbar negative for any acute processes. On exam AAO x3, no evidence of focal deficits, no sensory deficits. Patient with worsening crowding of toes, no h/o seeing podiatrist. Likely 2/2 to hypokalemia vs. polypharmacy vs. muscle weakness/problems ambulating, less likely neurogenic or cardiogenic as no acute intracranial findings or cardiac symptoms.     - PT evaluation   - hold BP medications, as normotensive on presentation  - f/u orthostatic vitals    - avoid sedating agents, hold tramadol, tylenol PRN escalate as needed  - podiatry consult.    Problem/Plan - 3:  ·  Problem: Weakness.   ·  Plan: Patient presenting for progressive weakness and acute weakness that precedes falls. Patient endorses poorer PO intake, weight loss, constipation. Appears dehydrated on labs and PE. Hypokalemia to 2.7. Has history of depression and endorses worsening mood affect. Mild psychomotor retardation. Weakness likely 2/2 to poor PO intake i/s/o worsening depression. UA with blood, no RBCs, indicating muscle breakdown.    - f/u CK   - PT evaluation   - hold BP medications, as normotensive on presentation   - avoid sedating agents, hold tramadol, tylenol PRN escalate as needed  - restart psychiatric medications  - replete electrolytes   - encourage PO fluid intake  - f/u TSH, free T4.    Problem/Plan - 4:  ·  Problem: Polypharmacy.   ·  Plan: Patient with appropriate medications per [facility] list (quetiapine, trazodone, mirtazapine, loratadine), however currently with tramadol and on hypertensive medications although she was normotensive and had poor PO intake prior to admission.     - hold tramadol at present, discuss with PCP  - c/w tylenol 650 mg PRN for pain.    Problem/Plan - 5:  ·  Problem: Major depression.   ·  Plan: Patient with history of MDD, on Seroquel 150 mg at bedtime. Has not been taking medications, unclear reasoning, seems as though needed prescription to be filled. Endorses depressed mood, anhedonia, decreased PO intake, increased lethargy, psychomotor retardation.    - c/w quetiapine 150 mg at bedtime   - f/u outpatient PCP.    Problem/Plan - 6:  ·  Problem: Constipation.   ·  Plan: Patient does not remember last BM, has poor PO intake. Abdominal exam with no tenderness but fullness. Evidence of stool burden on CT.     - c/w bowel regimen of senna, miralax.    Problem/Plan - 7:  ·  Problem: Hypertension.   ·  Plan: Patient with PMH of hypertension on amlodipine and metoprolol. Normotensive on admission. Unclear why patient is on metoprolol (does not state prior cardiac history).    - holding metoprolol and amlodipine i/s/o falls and weakness  - f/u outpatient provider as to why on metoprolol.    Problem/Plan - 8:  ·  Problem: Emphysema lung.   ·  Plan: Patient with history of COPD/emphysema. Has albuterol inhaler at home. No evidence of worsening respiratory status.     - duonebs PRN  - continue to monitor.    Problem/Plan - 9:  ·  Problem: Nutrition, metabolism, and development symptoms.   ·  Plan: F: per PO   E: replete K < 4, Mg <2   N: DASH  DVT: lovenox  Dispo: RMF.

## 2021-12-04 NOTE — PROGRESS NOTE ADULT - ASSESSMENT
70 yo F with PMH of anxiety, MDD, HTN, high cholesterol, COPD, b/l ankle/knee fracture (4 years ago at [facility]), scoliosis, presenting for fall 2 days ago and leg weakness. Patient is a poor historian. Patient has been having leg weakness over past 5 days. Podiatry was consulted for elongated, thickened nails and hammering of the digits 2-5 b/l. B/L foot X rays benign at this time.     Plan:  - Dystrophic nails debrided using nail nippers to reduce length and bulk w/o adverse incident, and to pt's satisfaction.   - Ammonium Lactate 12% cream applied to b/l feet; Instructed pt on proper usage of lotion, noting importance of not applying it between the toes and to apply only to the top and bottom of her feet. Also notified nursing staff so patient may have assistance in doing so.   - Rest of plan per primary team    Podiatry signing off. Please re-consult as needed.

## 2021-12-04 NOTE — PROGRESS NOTE ADULT - ASSESSMENT
72 yo F with PMH of anxiety, MDD, HTN, high cholesterol, COPD, b/l ankle/knee fracture (4 years ago at [facility]), scoliosis, presenting for fall 2 days ago and leg weakness, found to have hypokalemia and persistent sinus tachycardia. 72 yo F with PMH of anxiety, MDD, HTN, high cholesterol, COPD, b/l ankle/knee fracture (4 years ago at [facility]), scoliosis, presenting for fall 2 days ago and leg weakness, found to have hypokalemia (now resolved), rhabdomyolysis (resolving), and occasional sinus tachycardia.

## 2021-12-04 NOTE — PROGRESS NOTE ADULT - PROBLEM SELECTOR PLAN 3
CK > 14k on admission. Likely 2/2 trauma from multiple recent falls. No evidence of FALGUNI. Downtrending.  - C/w  cc/hr  - Lung checks q12h  - Trend CK daily CK > 14k on admission. Likely 2/2 trauma from multiple recent falls. No evidence of FALGUNI. Downtrending.  - C/w  cc/hr  - Lung checks q12h  - Trend CK daily    #Urinary retention  While on fluids for rhabdo, bladder scan >600, failure to straight cath x2, required urology to place lerma.  - Keep lerma while on fluids for rhabdo  - Consider removing lerma and TOV

## 2021-12-04 NOTE — CONSULT NOTE ADULT - TIME BILLING
-Pt. seen and examined  -VSS, slightly tachycardic  -Cardiology consulted for Sinus Tach  -TTE: Normal LVEF, hyperdynamic, normal RV size and function, no significant valvular disease PASP ~31mmHg, No effusion  -Sinus tachycardia likely multifactorial - including dehydration; Agree w/ IV hydration i/s/o rhabdomyolysis; Pt. on home Toprol 50 daily, currently held. Please start Toprol 25 today and uptitrate tmrw to 50 once daily    Dhaval Us MD  Cardiology Attending

## 2021-12-04 NOTE — PROGRESS NOTE ADULT - SUBJECTIVE AND OBJECTIVE BOX
Patient is a 71y old  Female who presents with a chief complaint of     INTERVAL HPI/ OVERNIGHT EVENTS      LABS                        16.3   8.29  )-----------( 269      ( 03 Dec 2021 08:05 )             48.3     12-03    142  |  108  |  6<L>  ----------------------------<  99  3.5   |  21<L>  |  0.67    Ca    8.8      03 Dec 2021 19:02  Phos  2.7     12-03  Mg     1.7     12-02    TPro  7.4  /  Alb  4.3  /  TBili  1.4<H>  /  DBili  x   /  AST  346<H>  /  ALT  66<H>  /  AlkPhos  92  12-02    PT/INR - ( 02 Dec 2021 23:34 )   PT: 14.9 sec;   INR: 1.26          PTT - ( 02 Dec 2021 23:34 )  PTT:27.6 sec    ICU Vital Signs Last 24 Hrs  T(C): 37.2 (04 Dec 2021 05:20), Max: 37.2 (04 Dec 2021 05:20)  T(F): 98.9 (04 Dec 2021 05:20), Max: 98.9 (04 Dec 2021 05:20)  HR: 140 (04 Dec 2021 05:20) (89 - 140)  BP: 118/76 (04 Dec 2021 05:20) (114/72 - 135/84)  BP(mean): 101 (03 Dec 2021 20:52) (101 - 101)  ABP: --  ABP(mean): --  RR: 17 (04 Dec 2021 05:20) (16 - 17)  SpO2: 96% (04 Dec 2021 05:20) (93% - 97%)      RADIOLOGY    MICROBIOLOGY    PHYSICAL EXAM  Lower Extremity Focused  Vasc:  Derm:  Neuro:  MSK: Patient is a 71y old  Female who presents with a chief complaint of     INTERVAL HPI/ OVERNIGHT EVENTS: Pt evaluated at bedside. She notes continued dryness to the bottom of her feet along with painful, incurvated and thickened nails. No other pedal complaints at this time.       LABS                        16.3   8.29  )-----------( 269      ( 03 Dec 2021 08:05 )             48.3     12-03    142  |  108  |  6<L>  ----------------------------<  99  3.5   |  21<L>  |  0.67    Ca    8.8      03 Dec 2021 19:02  Phos  2.7     12-03  Mg     1.7     12-02    TPro  7.4  /  Alb  4.3  /  TBili  1.4<H>  /  DBili  x   /  AST  346<H>  /  ALT  66<H>  /  AlkPhos  92  12-02    PT/INR - ( 02 Dec 2021 23:34 )   PT: 14.9 sec;   INR: 1.26          PTT - ( 02 Dec 2021 23:34 )  PTT:27.6 sec    ICU Vital Signs Last 24 Hrs  T(C): 37.2 (04 Dec 2021 05:20), Max: 37.2 (04 Dec 2021 05:20)  T(F): 98.9 (04 Dec 2021 05:20), Max: 98.9 (04 Dec 2021 05:20)  HR: 140 (04 Dec 2021 05:20) (89 - 140)  BP: 118/76 (04 Dec 2021 05:20) (114/72 - 135/84)  BP(mean): 101 (03 Dec 2021 20:52) (101 - 101)  ABP: --  ABP(mean): --  RR: 17 (04 Dec 2021 05:20) (16 - 17)  SpO2: 96% (04 Dec 2021 05:20) (93% - 97%)      RADIOLOGY    MICROBIOLOGY    PHYSICAL EXAM  Lower Extremity Focused  Vasc: R foot DP 1/4, PT 2/4, Left DP/PT 2/4. CFT <3sec b/l, no edema  Derm: Onychogyryphosis x10, diffuse xerosis present throughout the foot   Neuro: Protective sensations intact b/l   MSK: B/L HAV, B/L hammertoes digits 2-5

## 2021-12-04 NOTE — PROGRESS NOTE ADULT - SUBJECTIVE AND OBJECTIVE BOX
OVERNIGHT EVENTS: Tachy to >140 in AM, EKG showing sinus tachy w/ PACs. When at baseline 90 HR, no PACs noted on EKG. Cardiology consulted for possible need for telemetry, c/w work up for sinus tach for now. TSH WNL, low suspicion for PE/sepsis. Pt is not hypoxic, no crackles on exam.    SUBJECTIVE / INTERVAL HPI: Patient seen and examined at bedside. Pt denies any shortness of breath. Pt denies fever, chest pain. Denies any dizziness, lightheadedness, or headache.    VITAL SIGNS:  Vital Signs Last 24 Hrs  T(C): 36.8 (04 Dec 2021 14:09), Max: 37.2 (04 Dec 2021 05:20)  T(F): 98.2 (04 Dec 2021 14:09), Max: 98.9 (04 Dec 2021 05:20)  HR: 99 (04 Dec 2021 14:09) (89 - 140)  BP: 121/77 (04 Dec 2021 14:09) (114/72 - 135/84)  BP(mean): 101 (03 Dec 2021 20:52) (101 - 101)  RR: 15 (04 Dec 2021 14:09) (15 - 17)  SpO2: 98% (04 Dec 2021 14:09) (93% - 98%)    PHYSICAL EXAM:  GENERAL: NAD, lying in bed, low mood, psychomotor retardation  HEAD:  Atraumatic, Normocephalic  EYES: EOMI, PERRLA, conjunctiva and sclera clear  ENT: dry mucous membranes  CHEST/LUNG: Clear to auscultation bilaterally; No rales, rhonchi, wheezing, or rubs.   HEART: Regular rate and rhythm; No murmurs, rubs, or gallops  ABDOMEN: Bowel sounds present; soft, large abdomen, fullness in LLQ, non-tender.   EXTREMITIES:  2+ radial pulses, +1 DP pulses.  NERVOUS SYSTEM:  Alert & Oriented X3, speech difficult to understand, low volume w/ fast speech. No focal deficits.  MSK: 4/5 strength in b/l hips, 5/5 b/l knee and ankle strength. sensation intact in LE b/l.      MEDICATIONS:  MEDICATIONS  (STANDING):  ascorbic acid 500 milliGRAM(s) Oral daily  atorvastatin 10 milliGRAM(s) Oral at bedtime  calcium carbonate    500 mG (Tums) Chewable 1 Tablet(s) Chew two times a day  celecoxib 100 milliGRAM(s) Oral two times a day  cholecalciferol 400 Unit(s) Oral every 12 hours  cyanocobalamin 1000 MICROGram(s) Oral daily  enoxaparin Injectable 40 milliGRAM(s) SubCutaneous every 24 hours  ferrous    sulfate 325 milliGRAM(s) Oral daily  folic acid 1 milliGRAM(s) Oral daily  lactated ringers. 1000 milliLiter(s) (150 mL/Hr) IV Continuous <Continuous>  loratadine 10 milliGRAM(s) Oral daily  magnesium sulfate  IVPB 4 Gram(s) IV Intermittent once  mirtazapine 45 milliGRAM(s) Oral at bedtime  multivitamin 1 Tablet(s) Oral daily  pantoprazole    Tablet 40 milliGRAM(s) Oral before breakfast  potassium phosphate IVPB 15 milliMole(s) IV Intermittent once  QUEtiapine 150 milliGRAM(s) Oral at bedtime  senna 2 Tablet(s) Oral at bedtime    MEDICATIONS  (PRN):  albuterol/ipratropium for Nebulization 3 milliLiter(s) Nebulizer every 4 hours PRN Shortness of Breath and/or Wheezing      ALLERGIES:  Allergies    baclofen (Unknown)  BuSpar (Unknown)  Zyprexa (Unknown)    Intolerances        LABS:                        14.0   5.94  )-----------( 244      ( 04 Dec 2021 13:01 )             41.8     12-04    145  |  111<H>  |  5<L>  ----------------------------<  105<H>  3.9   |  23  |  0.70    Ca    9.0      04 Dec 2021 13:01  Phos  1.9     12-04  Mg     1.6     12-04    TPro  6.1  /  Alb  3.5  /  TBili  1.0  /  DBili  x   /  AST  253<H>  /  ALT  71<H>  /  AlkPhos  67  12-04    PT/INR - ( 02 Dec 2021 23:34 )   PT: 14.9 sec;   INR: 1.26          PTT - ( 02 Dec 2021 23:34 )  PTT:27.6 sec  Urinalysis Basic - ( 03 Dec 2021 03:17 )    Color: Yellow / Appearance: Clear / S.010 / pH: x  Gluc: x / Ketone: Trace mg/dL  / Bili: Negative / Urobili: 0.2 E.U./dL   Blood: x / Protein: NEGATIVE mg/dL / Nitrite: NEGATIVE   Leuk Esterase: Small / RBC: < 5 /HPF / WBC < 5 /HPF   Sq Epi: x / Non Sq Epi: 0-5 /HPF / Bacteria: Few /HPF      CAPILLARY BLOOD GLUCOSE          RADIOLOGY & ADDITIONAL TESTS: Reviewed.

## 2021-12-04 NOTE — CONSULT NOTE ADULT - SUBJECTIVE AND OBJECTIVE BOX
Attending: Dr. Luke Senior DPM    Patient is a 71y old  Female who presents with a chief complaint of leg weakness over the past 5 days.    HPI:  Patient is a 72 yo F with PMH of anxiety, MDD, HTN, high cholesterol, COPD, b/l ankle/knee fracture (4 years ago at [facility]), scoliosis, presenting for fall 2 days ago and leg weakness. Patient is a poor historian. Patient has been having leg weakness over past 5 days. Typically she ambulates with a cane. She has had episodes in which it feels like her "legs give out at the knee" which have led to falls in her apartment. Her first fall was 4 days ago, then had two falls the subsequent day. Fell backwards and hit her head. Denies lightheadedness dizziness, or visual defects. She states that she has stopped taking her depression medication, for unclear reason, however is compliant with other medications. She takes tramadol for pain, does not take more than prescribed amount. She has started no new medications in past few days/weeks. Endorses worsening depression with low mood, anhedonia, changes in sleep, decreased appetite. She does not remember her last bowel movement. Endorses weight loss after "couple years." Endorses that she had appropriate cancer screening with PCP Dr. Verdugo. Denies fevers, chills, chest pain, abdominal pain, worsening back pain (has back pain at baseline), incontinence, paresthesia, numbness, tingling in extremities.     In ED:  Vitals T 98.1 /73  RR 18 95% on RA   Labs Hgb 16.3, K 2.7, AST//66   Interventions: morphine 2 mg IV, potassium repletion, ketorolac 15 mg IV  EKG: tachycardic, sinus, QT prolonged to 526    Podiatry was consulted for elongated, thickened nails and hammering of the digits 2-5 b/l.      (03 Dec 2021 08:21)    Review of systems negative except per HPI    PAST MEDICAL & SURGICAL HISTORY:  Back pain    Knee pain, chronic    Depression    Emphysema lung    Hyperlipidemia    Hypokalemia    Hypertension    Lung nodules    H/O Spinal surgery      Home Medications:    Allergies    baclofen (Unknown)  BuSpar (Unknown)  Zyprexa (Unknown)    Intolerances      FAMILY HISTORY:  Family history of heart disease (Father)    Family history of CVA (Mother)      Social History:       LABS                        16.3   8.29  )-----------( 269      ( 03 Dec 2021 08:05 )             48.3     12-03    145  |  106  |  4<L>  ----------------------------<  99  3.2<L>   |  25  |  0.77    Ca    9.5      03 Dec 2021 08:05  Phos  2.7     12-03  Mg     1.7     12-02    TPro  7.4  /  Alb  4.3  /  TBili  1.4<H>  /  DBili  x   /  AST  346<H>  /  ALT  66<H>  /  AlkPhos  92  12-02    PT/INR - ( 02 Dec 2021 23:34 )   PT: 14.9 sec;   INR: 1.26          PTT - ( 02 Dec 2021 23:34 )  PTT:27.6 sec    Vital Signs Last 24 Hrs  T(C): 36.7 (03 Dec 2021 11:12), Max: 37 (03 Dec 2021 07:35)  T(F): 98.1 (03 Dec 2021 11:12), Max: 98.6 (03 Dec 2021 07:35)  HR: 102 (03 Dec 2021 11:12) (90 - 107)  BP: 128/82 (03 Dec 2021 11:12) (104/58 - 144/93)  BP(mean): --  RR: 16 (03 Dec 2021 11:12) (16 - 18)  SpO2: 93% (03 Dec 2021 11:12) (93% - 97%)    PHYSICAL EXAM  General: NAD, AA0x3    Lower Extremity Focused:  Vasc: R foot DP 1/4, PT 2/4, Left DP/PT 2/4. CFT <3sec b/l, no edema  Derm: Onychogyryphosis x10, diffuse xerosis present throughout the foot   Neuro: Protective sensations intact b/l   MSK: B/L HAV, B/L hammertoes digits 2-5    RADIOLOGY                      
  Patient is a 71y old  Female who presents with a chief complaint of      HPI:  Patient is a 72 yo F with PMH of anxiety, MDD, HTN, high cholesterol, COPD, b/l ankle/knee fracture (4 years ago at [facility]), scoliosis, presenting for fall 2 days ago and leg weakness. Patient is a poor historian. Patient has been having leg weakness over past 5 days. Typically she ambulates with a cane. She has had episodes in which it feels like her "legs give out at the knee" which have led to falls in her apartment. Her first fall was 4 days ago, then had two falls the subsequent day. Fell backwards and hit her head. Denies lightheadedness dizziness, or visual defects. She states that she has stopped taking her depression medication, for unclear reason, however is compliant with other medications. She takes tramadol for pain, does not take more than prescribed amount. She has started no new medications in past few days/weeks. Endorses worsening depression with low mood, anhedonia, changes in sleep, decreased appetite. She does not remember her last bowel movement. Endorses weight loss after "couple years." Endorses that she had appropriate cancer screening with PCP Dr. Verdugo. Denies fevers, chills, chest pain, abdominal pain, worsening back pain (has back pain at baseline), incontinence, paresthesia, numbness, tingling in extremities.     In ED:  Vitals T 98.1 /73  RR 18 95% on RA   Labs Hgb 16.3, K 2.7, AST//66   Interventions: morphine 2 mg IV, potassium repletion, ketorolac 15 mg IV  EKG: tachycardic, sinus, QT prolonged to 526     (03 Dec 2021 08:21)      PAST MEDICAL & SURGICAL HISTORY:  Back pain    Knee pain, chronic    Depression    Emphysema lung    Hyperlipidemia    Hypokalemia    Hypertension    Lung nodules    H/O Spinal surgery        MEDICATIONS  (STANDING):  ascorbic acid 500 milliGRAM(s) Oral daily  atorvastatin 10 milliGRAM(s) Oral at bedtime  calcium carbonate    500 mG (Tums) Chewable 1 Tablet(s) Chew two times a day  celecoxib 100 milliGRAM(s) Oral two times a day  cholecalciferol 400 Unit(s) Oral every 12 hours  cyanocobalamin 1000 MICROGram(s) Oral daily  enoxaparin Injectable 40 milliGRAM(s) SubCutaneous every 24 hours  ferrous    sulfate 325 milliGRAM(s) Oral daily  folic acid 1 milliGRAM(s) Oral daily  lactated ringers. 1000 milliLiter(s) (150 mL/Hr) IV Continuous <Continuous>  loratadine 10 milliGRAM(s) Oral daily  mirtazapine 45 milliGRAM(s) Oral at bedtime  multivitamin 1 Tablet(s) Oral daily  pantoprazole    Tablet 40 milliGRAM(s) Oral before breakfast  QUEtiapine 150 milliGRAM(s) Oral at bedtime  senna 2 Tablet(s) Oral at bedtime    MEDICATIONS  (PRN):  albuterol/ipratropium for Nebulization 3 milliLiter(s) Nebulizer every 4 hours PRN Shortness of Breath and/or Wheezing        FAMILY HISTORY:  Family history of heart disease (Father)    Family history of CVA (Mother)        CBC Full  -  ( 04 Dec 2021 13:01 )  WBC Count : 5.94 K/uL  RBC Count : 4.31 M/uL  Hemoglobin : 14.0 g/dL  Hematocrit : 41.8 %  Platelet Count - Automated : 244 K/uL  Mean Cell Volume : 97.0 fl  Mean Cell Hemoglobin : 32.5 pg  Mean Cell Hemoglobin Concentration : 33.5 gm/dL  Auto Neutrophil # : 2.94 K/uL  Auto Lymphocyte # : 1.84 K/uL  Auto Monocyte # : 0.73 K/uL  Auto Eosinophil # : 0.34 K/uL  Auto Basophil # : 0.06 K/uL  Auto Neutrophil % : 49.5 %  Auto Lymphocyte % : 31.0 %  Auto Monocyte % : 12.3 %  Auto Eosinophil % : 5.7 %  Auto Basophil % : 1.0 %          145  |  111<H>  |  5<L>  ----------------------------<  105<H>  3.9   |  23  |  0.70    Ca    9.0      04 Dec 2021 13:01  Phos  1.9     12-  Mg     1.6         TPro  6.1  /  Alb  3.5  /  TBili  1.0  /  DBili  x   /  AST  253<H>  /  ALT  71<H>  /  AlkPhos  67  12-      Urinalysis Basic - ( 03 Dec 2021 03:17 )    Color: Yellow / Appearance: Clear / S.010 / pH: x  Gluc: x / Ketone: Trace mg/dL  / Bili: Negative / Urobili: 0.2 E.U./dL   Blood: x / Protein: NEGATIVE mg/dL / Nitrite: NEGATIVE   Leuk Esterase: Small / RBC: < 5 /HPF / WBC < 5 /HPF   Sq Epi: x / Non Sq Epi: 0-5 /HPF / Bacteria: Few /HPF          Radiology:    < from: CT Head No Cont (21 @ 01:55) >  EXAM:  CT CERVICAL SPINE                          EXAM:  CT BRAIN                          PROCEDURE DATE:  2021          INTERPRETATION:  PROCEDURE: CT head without intravenous contrast    CLINICAL INDICATION: Fall.    TECHNIQUE:  Multiple axial images were obtained and viewed at 5 mm intervals from the skull base to the vertex. The images were reviewed in brain and bone windows. Sagittal and coronal reformations are provided.    COMPARISON EXAMINATION: None.    FINDINGS:  VENTRICLES ANDSULCI: There is ventricular and sulcal prominence consistent with generalized age related cerebral volume loss. No hydrocephalus.  INTRA-AXIAL: No intracranial mass, acute hemorrhage, or midline shift is present. No acute transcortical infarction. There are scattered hypodensities throughout the periventricular white matter, likely the sequela of chronic small vessel ischemic disease.  EXTRA-AXIAL: No extra-axial fluid collection is present.  VISUALIZED SINUSES: No air-fluid levels are identified.  VISUALIZED MASTOIDS: Well aerated.  CALVARIUM: No fracture.  MISCELLANEOUS:  None.    IMPRESSION:    1.  No acute intracranial hemorrhage or calvarial fracture.  2.  Chronic microvascular ischemic disease.        < from: CT Lumbar Spine No Cont (21 @ 01:55) >  EXAM:  CT LUMBAR SPINE                          PROCEDURE DATE:  2021          INTERPRETATION:  PROCEDURE: CT Lumbar spine without contrast    INDICATION: Fall. Difficulty walking.    TECHNIQUE:  Multiple axial sections were obtained from thethoracolumbar junction through the sacrum. Sagittal and coronal reformats were obtained from the axial data set. The images were reviewed in soft tissue and bone windows.    COMPARISON: None.    FINDINGS: Dextroscoliosis of the thoracolumbar spine. Straightening of lordosis. Extensive osseous fusion involving the posterior elements of the and thoracolumbar spine. No compression fracture. No listhesis. There is osteopenic/osteoporotic changes with osseous demineralization. The transverse processesare intact.    No intramuscular hematoma or edema involving the right or left psoas muscle. Mild asymmetric atrophy of the right psoas muscle. No paravertebral mass. The dorsal soft tissues are grossly unremarkable.    Disc bulge eccentric towards the left with ligamentum flavum thickening at L3-4. Findings result in mild central canal and subarticular zone stenosis. Severe left and mild right foraminal narrowing.    Broad-based diffuse disc bulge with ligamentum flavum thickening at L4-5. Findings result in moderate central canal and subarticular zone stenosis. Moderate bilateral foraminal narrowing.    Small broad disc bulge at L5-S1. Findings result in mild canal and subarticular zone stenosis. Mild bilateral foraminal narrowing.    Included lower abdominal viscera demonstrates no hydronephrosis. No focal adrenal lesion. Moderately distended urinary bladder. Stool-filled loops of large bowel. The uterus appears within normal limits. There is concentric arthrosclerotic calcification involving the abdominal aorta and bilateral common iliac arteries.    Included lung bases demonstrate diffuse centrilobular emphysema.    IMPRESSION:    1.  No acute bony fracture or traumatic malalignment.  2.  Bamboo spine appearance which could be seen with ankylosing spondylitis.  3.  Multilevel degenerative changes.              Vital Signs Last 24 Hrs  T(C): 36.8 (04 Dec 2021 14:09), Max: 37.2 (04 Dec 2021 05:20)  T(F): 98.2 (04 Dec 2021 14:09), Max: 98.9 (04 Dec 2021 05:20)  HR: 99 (04 Dec 2021 14:09) (89 - 140)  BP: 121/77 (04 Dec 2021 14:09) (114/72 - 135/84)  BP(mean): 101 (03 Dec 2021 20:52) (101 - 101)  RR: 15 (04 Dec 2021 14:09) (15 - 17)  SpO2: 98% (04 Dec 2021 14:09) (93% - 98%)        REVIEW OF SYSTEMS: as  per HPI    Physical Exam:  frail 72 yo AA woman lying in semi Steven's position, awake, alert, no acute complaints    Neurologic Exam:    Alert and oriented to person, place, date/year, speech fluent w/o dysarthria, follows commands    Cranial Nerves:     II:                         pupils equal, round and reactive to light, visual fields intact   III/ IV/VI:              extraocular movements intact, neg nystagmus, neg ptosis  V:                        facial sensation intact, V1-3 normal  VII:                      face symmetric, no droop, normal eye closure and smile  VIII:                     hearing intact to finger rub bilaterally  IX and X:             no hoarseness, gag intact, palate/ uvula rise symmetrically  XI:                       SCM/ trapezius strength intact bilateral  XII:                      no tongue deviation    Motor Exam:    Right UE:             no focal weakness,  > 3+/5 throughout                             pronator drift: neg    Left UE:                no focal weakness,  > 3+/5 throughout                             pronator drift: neg      Right LE:              no focal weakness,  > 3+/5 throughout    Left LE:                no focal weakness,  > 3+/5 throughout               Sensation:           intact to light touch x 4 extremities                                                DTR:                  biceps/brachioradialis: equal                                                      patella/ankle: equal                              Gait:  not tested        PM&R Impression:    1) deconditioned  2) no focal weakness    Recommendations/ Plan :    1) Physical therapy focusing on therapeutic exercises, bed mobility/transfer out of bed evaluation, progressive ambulation with assistive devices prn.    2) Anticipated Disposition Plan/Recs:    subacute rehab placement                  
HPI:  Patient is a 70 yo F with PMH of anxiety, MDD, HTN, high cholesterol, COPD, b/l ankle/knee fracture (4 years ago at [facility]), scoliosis, presenting for fall 2 days ago and leg weakness. Patient is a poor historian. Patient has been having leg weakness over past 5 days. Typically she ambulates with a cane. She has had episodes in which it feels like her "legs give out at the knee" which have led to falls in her apartment. Her first fall was 4 days ago, then had two falls the subsequent day. Fell backwards and hit her head. Denies lightheadedness dizziness, or visual defects. She states that she has stopped taking her depression medication, for unclear reason, however is compliant with other medications. She takes tramadol for pain, does not take more than prescribed amount. She has started no new medications in past few days/weeks. Endorses worsening depression with low mood, anhedonia, changes in sleep, decreased appetite. She does not remember her last bowel movement. Endorses weight loss after "couple years." Endorses that she had appropriate cancer screening with PCP Dr. Verdugo. Denies fevers, chills, chest pain, abdominal pain, worsening back pain (has back pain at baseline), incontinence, paresthesia, numbness, tingling in extremities.     In ED:  Vitals T 98.1 /73  RR 18 95% on RA   Labs Hgb 16.3, K 2.7, AST//66   Interventions: morphine 2 mg IV, potassium repletion, ketorolac 15 mg IV  EKG: tachycardic, sinus, QT prolonged to 526     (03 Dec 2021 08:21)      ROS: A 10-point review of systems was otherwise negative.    PAST MEDICAL & SURGICAL HISTORY:  Back pain    Knee pain, chronic    Depression    Emphysema lung    Hyperlipidemia    Hypokalemia    Hypertension    Lung nodules    H/O Spinal surgery      SOCIAL HISTORY:  FAMILY HISTORY:  Family history of heart disease (Father)    Family history of CVA (Mother)      ALLERGIES: 	  baclofen (Unknown)  BuSpar (Unknown)  Zyprexa (Unknown)          MEDICATIONS:  albuterol/ipratropium for Nebulization 3 milliLiter(s) Nebulizer every 4 hours PRN  ascorbic acid 500 milliGRAM(s) Oral daily  atorvastatin 10 milliGRAM(s) Oral at bedtime  calcium carbonate    500 mG (Tums) Chewable 1 Tablet(s) Chew two times a day  celecoxib 100 milliGRAM(s) Oral two times a day  cholecalciferol 400 Unit(s) Oral every 12 hours  cyanocobalamin 1000 MICROGram(s) Oral daily  enoxaparin Injectable 40 milliGRAM(s) SubCutaneous every 24 hours  ferrous    sulfate 325 milliGRAM(s) Oral daily  folic acid 1 milliGRAM(s) Oral daily  lactated ringers. 1000 milliLiter(s) IV Continuous <Continuous>  loratadine 10 milliGRAM(s) Oral daily  multivitamin 1 Tablet(s) Oral daily  pantoprazole    Tablet 40 milliGRAM(s) Oral before breakfast  polyethylene glycol 3350 17 Gram(s) Oral every 24 hours  senna 2 Tablet(s) Oral at bedtime      PHYSICAL EXAM:  T(C): 36.8 (12-04-21 @ 14:09), Max: 37.2 (12-04-21 @ 05:20)  HR: 99 (12-04-21 @ 14:09) (89 - 140)  BP: 121/77 (12-04-21 @ 14:09) (118/76 - 135/84)  RR: 15 (12-04-21 @ 14:09) (15 - 17)  SpO2: 98% (12-04-21 @ 14:09) (96% - 98%)  Wt(kg): --    GENERAL: NAD, psychomotor retardation  EYES: EOMI, PERRLA  CHEST/LUNG: Clear to auscultation bilaterally; No rales, rhonchi, wheezing, or rubs.   HEART: Regular rate and rhythm   EXTREMITIES: warm, no LLE edema  NERVOUS SYSTEM:  Alert & Oriented X3    I&O's Summary    03 Dec 2021 07:01  -  04 Dec 2021 07:00  --------------------------------------------------------  IN: 300 mL / OUT: 600 mL / NET: -300 mL    04 Dec 2021 07:01  -  04 Dec 2021 20:28  --------------------------------------------------------  IN: 2670 mL / OUT: 1200 mL / NET: 1470 mL        LABS:	 	                        14.0   5.94  )-----------( 244      ( 04 Dec 2021 13:01 )             41.8     12-04    145  |  111<H>  |  5<L>  ----------------------------<  105<H>  3.9   |  23  |  0.70    Ca    9.0      04 Dec 2021 13:01  Phos  1.9     12-04  Mg     1.6     12-04    TPro  6.1  /  Alb  3.5  /  TBili  1.0  /  DBili  x   /  AST  253<H>  /  ALT  71<H>  /  AlkPhos  67  12-04    CARDIAC MARKERS ( 04 Dec 2021 13:01 )  x     / x     / 9624 U/L / x     / x      CARDIAC MARKERS ( 03 Dec 2021 19:02 )  x     / x     / 99097 U/L / x     / x      CARDIAC MARKERS ( 03 Dec 2021 08:05 )  x     / x     / 05925 U/L / x     / x      CARDIAC MARKERS ( 02 Dec 2021 23:34 )  x     / 0.01 ng/mL / x     / x     / x          proBNP: Serum Pro-Brain Natriuretic Peptide: 165 pg/mL (12-04 @ 13:01)

## 2021-12-04 NOTE — PROCEDURE NOTE - ADDITIONAL PROCEDURE DETAILS
Purewick device in place with no output, bladder scan 700.  Using sterile technique, patient prepped and draped.  12F lerma placed with no resistance.  No altered anatomy noted.      Trial of void per primary team.

## 2021-12-05 DIAGNOSIS — R33.8 OTHER RETENTION OF URINE: ICD-10-CM

## 2021-12-05 LAB
ALBUMIN SERPL ELPH-MCNC: 3.8 G/DL — SIGNIFICANT CHANGE UP (ref 3.3–5)
ALP SERPL-CCNC: 76 U/L — SIGNIFICANT CHANGE UP (ref 40–120)
ALT FLD-CCNC: 90 U/L — HIGH (ref 10–45)
AMPHET UR-MCNC: NEGATIVE — SIGNIFICANT CHANGE UP
ANION GAP SERPL CALC-SCNC: 11 MMOL/L — SIGNIFICANT CHANGE UP (ref 5–17)
AST SERPL-CCNC: 298 U/L — HIGH (ref 10–40)
BARBITURATES UR SCN-MCNC: NEGATIVE — SIGNIFICANT CHANGE UP
BENZODIAZ UR-MCNC: NEGATIVE — SIGNIFICANT CHANGE UP
BILIRUB SERPL-MCNC: 0.8 MG/DL — SIGNIFICANT CHANGE UP (ref 0.2–1.2)
BUN SERPL-MCNC: 2 MG/DL — LOW (ref 7–23)
CALCIUM SERPL-MCNC: 9.3 MG/DL — SIGNIFICANT CHANGE UP (ref 8.4–10.5)
CHLORIDE SERPL-SCNC: 105 MMOL/L — SIGNIFICANT CHANGE UP (ref 96–108)
CK SERPL-CCNC: 9686 U/L — HIGH (ref 25–170)
CO2 SERPL-SCNC: 25 MMOL/L — SIGNIFICANT CHANGE UP (ref 22–31)
COCAINE METAB.OTHER UR-MCNC: NEGATIVE — SIGNIFICANT CHANGE UP
CREAT SERPL-MCNC: 0.59 MG/DL — SIGNIFICANT CHANGE UP (ref 0.5–1.3)
GLUCOSE SERPL-MCNC: 92 MG/DL — SIGNIFICANT CHANGE UP (ref 70–99)
HCT VFR BLD CALC: 45.1 % — HIGH (ref 34.5–45)
HGB BLD-MCNC: 15 G/DL — SIGNIFICANT CHANGE UP (ref 11.5–15.5)
MAGNESIUM SERPL-MCNC: 1.7 MG/DL — SIGNIFICANT CHANGE UP (ref 1.6–2.6)
MCHC RBC-ENTMCNC: 33 PG — SIGNIFICANT CHANGE UP (ref 27–34)
MCHC RBC-ENTMCNC: 33.3 GM/DL — SIGNIFICANT CHANGE UP (ref 32–36)
MCV RBC AUTO: 99.1 FL — SIGNIFICANT CHANGE UP (ref 80–100)
METHADONE UR-MCNC: NEGATIVE — SIGNIFICANT CHANGE UP
NRBC # BLD: 0 /100 WBCS — SIGNIFICANT CHANGE UP (ref 0–0)
OPIATES UR-MCNC: NEGATIVE — SIGNIFICANT CHANGE UP
PCP SPEC-MCNC: SIGNIFICANT CHANGE UP
PCP UR-MCNC: NEGATIVE — SIGNIFICANT CHANGE UP
PHOSPHATE SERPL-MCNC: 2.5 MG/DL — SIGNIFICANT CHANGE UP (ref 2.5–4.5)
PLATELET # BLD AUTO: 263 K/UL — SIGNIFICANT CHANGE UP (ref 150–400)
POTASSIUM SERPL-MCNC: 3.9 MMOL/L — SIGNIFICANT CHANGE UP (ref 3.5–5.3)
POTASSIUM SERPL-SCNC: 3.9 MMOL/L — SIGNIFICANT CHANGE UP (ref 3.5–5.3)
PROT SERPL-MCNC: 6.9 G/DL — SIGNIFICANT CHANGE UP (ref 6–8.3)
RBC # BLD: 4.55 M/UL — SIGNIFICANT CHANGE UP (ref 3.8–5.2)
RBC # FLD: 13.2 % — SIGNIFICANT CHANGE UP (ref 10.3–14.5)
SODIUM SERPL-SCNC: 141 MMOL/L — SIGNIFICANT CHANGE UP (ref 135–145)
THC UR QL: NEGATIVE — SIGNIFICANT CHANGE UP
TSH SERPL-MCNC: 1.9 UIU/ML — SIGNIFICANT CHANGE UP (ref 0.27–4.2)
WBC # BLD: 5.69 K/UL — SIGNIFICANT CHANGE UP (ref 3.8–10.5)
WBC # FLD AUTO: 5.69 K/UL — SIGNIFICANT CHANGE UP (ref 3.8–10.5)

## 2021-12-05 PROCEDURE — 99233 SBSQ HOSP IP/OBS HIGH 50: CPT

## 2021-12-05 PROCEDURE — 99232 SBSQ HOSP IP/OBS MODERATE 35: CPT

## 2021-12-05 RX ORDER — MIRTAZAPINE 45 MG/1
45 TABLET, ORALLY DISINTEGRATING ORAL AT BEDTIME
Refills: 0 | Status: DISCONTINUED | OUTPATIENT
Start: 2021-12-05 | End: 2021-12-09

## 2021-12-05 RX ORDER — METOPROLOL TARTRATE 50 MG
25 TABLET ORAL DAILY
Refills: 0 | Status: DISCONTINUED | OUTPATIENT
Start: 2021-12-05 | End: 2021-12-06

## 2021-12-05 RX ORDER — QUETIAPINE FUMARATE 200 MG/1
200 TABLET, FILM COATED ORAL AT BEDTIME
Refills: 0 | Status: DISCONTINUED | OUTPATIENT
Start: 2021-12-05 | End: 2021-12-09

## 2021-12-05 RX ORDER — ACETAMINOPHEN 500 MG
650 TABLET ORAL EVERY 6 HOURS
Refills: 0 | Status: DISCONTINUED | OUTPATIENT
Start: 2021-12-05 | End: 2021-12-09

## 2021-12-05 RX ORDER — QUETIAPINE FUMARATE 200 MG/1
1 TABLET, FILM COATED ORAL
Qty: 0 | Refills: 0 | DISCHARGE

## 2021-12-05 RX ORDER — POTASSIUM CHLORIDE 20 MEQ
20 PACKET (EA) ORAL ONCE
Refills: 0 | Status: COMPLETED | OUTPATIENT
Start: 2021-12-05 | End: 2021-12-05

## 2021-12-05 RX ORDER — MAGNESIUM SULFATE 500 MG/ML
2 VIAL (ML) INJECTION ONCE
Refills: 0 | Status: COMPLETED | OUTPATIENT
Start: 2021-12-05 | End: 2021-12-05

## 2021-12-05 RX ADMIN — Medication 25 MILLIGRAM(S): at 15:01

## 2021-12-05 RX ADMIN — Medication 400 UNIT(S): at 09:34

## 2021-12-05 RX ADMIN — PREGABALIN 1000 MICROGRAM(S): 225 CAPSULE ORAL at 12:46

## 2021-12-05 RX ADMIN — CELECOXIB 100 MILLIGRAM(S): 200 CAPSULE ORAL at 06:59

## 2021-12-05 RX ADMIN — Medication 650 MILLIGRAM(S): at 15:01

## 2021-12-05 RX ADMIN — ATORVASTATIN CALCIUM 10 MILLIGRAM(S): 80 TABLET, FILM COATED ORAL at 22:28

## 2021-12-05 RX ADMIN — Medication 1 MILLIGRAM(S): at 12:46

## 2021-12-05 RX ADMIN — PANTOPRAZOLE SODIUM 40 MILLIGRAM(S): 20 TABLET, DELAYED RELEASE ORAL at 07:00

## 2021-12-05 RX ADMIN — Medication 50 GRAM(S): at 10:24

## 2021-12-05 RX ADMIN — SENNA PLUS 2 TABLET(S): 8.6 TABLET ORAL at 22:29

## 2021-12-05 RX ADMIN — Medication 1 TABLET(S): at 06:59

## 2021-12-05 RX ADMIN — LORATADINE 10 MILLIGRAM(S): 10 TABLET ORAL at 12:47

## 2021-12-05 RX ADMIN — Medication 325 MILLIGRAM(S): at 12:47

## 2021-12-05 RX ADMIN — Medication 400 UNIT(S): at 21:53

## 2021-12-05 RX ADMIN — Medication 1 TABLET(S): at 19:00

## 2021-12-05 RX ADMIN — Medication 650 MILLIGRAM(S): at 23:48

## 2021-12-05 RX ADMIN — ENOXAPARIN SODIUM 40 MILLIGRAM(S): 100 INJECTION SUBCUTANEOUS at 12:47

## 2021-12-05 RX ADMIN — CELECOXIB 100 MILLIGRAM(S): 200 CAPSULE ORAL at 07:59

## 2021-12-05 RX ADMIN — MIRTAZAPINE 45 MILLIGRAM(S): 45 TABLET, ORALLY DISINTEGRATING ORAL at 22:28

## 2021-12-05 RX ADMIN — Medication 650 MILLIGRAM(S): at 15:31

## 2021-12-05 RX ADMIN — Medication 20 MILLIEQUIVALENT(S): at 12:46

## 2021-12-05 RX ADMIN — Medication 1 TABLET(S): at 12:46

## 2021-12-05 RX ADMIN — Medication 1 APPLICATION(S): at 07:00

## 2021-12-05 RX ADMIN — SODIUM CHLORIDE 150 MILLILITER(S): 9 INJECTION, SOLUTION INTRAVENOUS at 23:48

## 2021-12-05 RX ADMIN — CELECOXIB 100 MILLIGRAM(S): 200 CAPSULE ORAL at 19:33

## 2021-12-05 RX ADMIN — CELECOXIB 100 MILLIGRAM(S): 200 CAPSULE ORAL at 19:00

## 2021-12-05 RX ADMIN — SODIUM CHLORIDE 150 MILLILITER(S): 9 INJECTION, SOLUTION INTRAVENOUS at 02:06

## 2021-12-05 RX ADMIN — Medication 500 MILLIGRAM(S): at 12:46

## 2021-12-05 RX ADMIN — SODIUM CHLORIDE 150 MILLILITER(S): 9 INJECTION, SOLUTION INTRAVENOUS at 10:24

## 2021-12-05 RX ADMIN — QUETIAPINE FUMARATE 200 MILLIGRAM(S): 200 TABLET, FILM COATED ORAL at 22:28

## 2021-12-05 NOTE — PROGRESS NOTE ADULT - ASSESSMENT
70 yo F with PMH of anxiety, MDD, HTN, high cholesterol, COPD, b/l ankle/knee fracture (4 years ago at [facility]), scoliosis, presenting for fall 2 days ago and leg weakness, found to have hypokalemia (now resolved), rhabdomyolysis (resolving), and occasional sinus tachycardia.

## 2021-12-05 NOTE — PROGRESS NOTE ADULT - SUBJECTIVE AND OBJECTIVE BOX
Medicine Progress Note    Patient is a 71y old  Female who presents with a chief complaint of fall (04 Dec 2021 14:36)      SUBJECTIVE / OVERNIGHT EVENTS:    ADDITIONAL REVIEW OF SYSTEMS:    MEDICATIONS  (STANDING):  ammonium lactate 12% Lotion 1 Application(s) Topical <User Schedule>  ascorbic acid 500 milliGRAM(s) Oral daily  atorvastatin 10 milliGRAM(s) Oral at bedtime  calcium carbonate    500 mG (Tums) Chewable 1 Tablet(s) Chew two times a day  celecoxib 100 milliGRAM(s) Oral two times a day  cholecalciferol 400 Unit(s) Oral every 12 hours  cyanocobalamin 1000 MICROGram(s) Oral daily  enoxaparin Injectable 40 milliGRAM(s) SubCutaneous every 24 hours  ferrous    sulfate 325 milliGRAM(s) Oral daily  folic acid 1 milliGRAM(s) Oral daily  lactated ringers. 1000 milliLiter(s) (150 mL/Hr) IV Continuous <Continuous>  loratadine 10 milliGRAM(s) Oral daily  metoprolol succinate ER 25 milliGRAM(s) Oral daily  multivitamin 1 Tablet(s) Oral daily  pantoprazole    Tablet 40 milliGRAM(s) Oral before breakfast  polyethylene glycol 3350 17 Gram(s) Oral every 24 hours  senna 2 Tablet(s) Oral at bedtime    MEDICATIONS  (PRN):  acetaminophen     Tablet .. 650 milliGRAM(s) Oral every 6 hours PRN Mild Pain (1 - 3)  albuterol/ipratropium for Nebulization 3 milliLiter(s) Nebulizer every 4 hours PRN Shortness of Breath and/or Wheezing    CAPILLARY BLOOD GLUCOSE        I&O's Summary    04 Dec 2021 07:01  -  05 Dec 2021 07:00  --------------------------------------------------------  IN: 4057.5 mL / OUT: 3050 mL / NET: 1007.5 mL        PHYSICAL EXAM:  Vital Signs Last 24 Hrs  T(C): 37.1 (05 Dec 2021 13:00), Max: 37.2 (04 Dec 2021 21:44)  T(F): 98.8 (05 Dec 2021 13:00), Max: 99 (04 Dec 2021 21:44)  HR: 115 (05 Dec 2021 13:00) (106 - 121)  BP: 153/96 (05 Dec 2021 13:00) (151/83 - 167/91)  BP(mean): --  RR: 18 (05 Dec 2021 13:00) (16 - 18)  SpO2: 93% (05 Dec 2021 13:00) (93% - 96%)  CONSTITUTIONAL: NAD, well-developed, well-groomed  ENMT: Moist oral mucosa, no pharyngeal injection or exudates; normal dentition  RESPIRATORY: Normal respiratory effort; lungs are clear to auscultation bilaterally  CARDIOVASCULAR: Regular rate and rhythm, normal S1 and S2, no murmur/rub/gallop; No lower extremity edema; Peripheral pulses are 2+ bilaterally  ABDOMEN: Nontender to palpation, normoactive bowel sounds, no rebound/guarding; No hepatosplenomegaly  PSYCH: A+O to person, place, and time; affect appropriate  NEUROLOGY: CN 2-12 are intact and symmetric; no gross sensory deficits   SKIN: No rashes; no palpable lesions    LABS:                        15.0   5.69  )-----------( 263      ( 05 Dec 2021 07:59 )             45.1     12-05    141  |  105  |  2<L>  ----------------------------<  92  3.9   |  25  |  0.59    Ca    9.3      05 Dec 2021 07:59  Phos  2.5     12-05  Mg     1.7     12-05    TPro  6.9  /  Alb  3.8  /  TBili  0.8  /  DBili  x   /  AST  298<H>  /  ALT  90<H>  /  AlkPhos  76  12-05      CARDIAC MARKERS ( 05 Dec 2021 07:59 )  x     / x     / 9686 U/L / x     / x      CARDIAC MARKERS ( 04 Dec 2021 13:01 )  x     / x     / 9624 U/L / x     / x      CARDIAC MARKERS ( 03 Dec 2021 19:02 )  x     / x     / 92778 U/L / x     / x              Culture - Urine (collected 03 Dec 2021 06:01)  Source: .Urine None  Final Report (04 Dec 2021 12:08):    Normal Urogenital edna present    Urinalysis with Rflx Culture (collected 03 Dec 2021 03:17)      COVID-19 PCR: Negative (02 Dec 2021 23:35)  COVID-19 PCR: Negative (02 Aug 2021 19:22)      RADIOLOGY & ADDITIONAL TESTS:  Imaging from Last 24 Hours:    Electrocardiogram/QTc Interval:    COORDINATION OF CARE:  Care Discussed with Consultants/Other Providers:

## 2021-12-06 ENCOUNTER — TRANSCRIPTION ENCOUNTER (OUTPATIENT)
Age: 71
End: 2021-12-06

## 2021-12-06 LAB
ANION GAP SERPL CALC-SCNC: 9 MMOL/L — SIGNIFICANT CHANGE UP (ref 5–17)
BUN SERPL-MCNC: 4 MG/DL — LOW (ref 7–23)
CALCIUM SERPL-MCNC: 8.9 MG/DL — SIGNIFICANT CHANGE UP (ref 8.4–10.5)
CHLORIDE SERPL-SCNC: 102 MMOL/L — SIGNIFICANT CHANGE UP (ref 96–108)
CK SERPL-CCNC: 5232 U/L — HIGH (ref 25–170)
CO2 SERPL-SCNC: 25 MMOL/L — SIGNIFICANT CHANGE UP (ref 22–31)
CREAT SERPL-MCNC: 0.66 MG/DL — SIGNIFICANT CHANGE UP (ref 0.5–1.3)
GLUCOSE BLDC GLUCOMTR-MCNC: 86 MG/DL — SIGNIFICANT CHANGE UP (ref 70–99)
GLUCOSE SERPL-MCNC: 95 MG/DL — SIGNIFICANT CHANGE UP (ref 70–99)
HCT VFR BLD CALC: 47.3 % — HIGH (ref 34.5–45)
HGB BLD-MCNC: 15.1 G/DL — SIGNIFICANT CHANGE UP (ref 11.5–15.5)
MAGNESIUM SERPL-MCNC: 1.8 MG/DL — SIGNIFICANT CHANGE UP (ref 1.6–2.6)
MCHC RBC-ENTMCNC: 31.8 PG — SIGNIFICANT CHANGE UP (ref 27–34)
MCHC RBC-ENTMCNC: 31.9 GM/DL — LOW (ref 32–36)
MCV RBC AUTO: 99.6 FL — SIGNIFICANT CHANGE UP (ref 80–100)
NRBC # BLD: 0 /100 WBCS — SIGNIFICANT CHANGE UP (ref 0–0)
PHOSPHATE SERPL-MCNC: 2.5 MG/DL — SIGNIFICANT CHANGE UP (ref 2.5–4.5)
PLATELET # BLD AUTO: 255 K/UL — SIGNIFICANT CHANGE UP (ref 150–400)
POTASSIUM SERPL-MCNC: 4.2 MMOL/L — SIGNIFICANT CHANGE UP (ref 3.5–5.3)
POTASSIUM SERPL-SCNC: 4.2 MMOL/L — SIGNIFICANT CHANGE UP (ref 3.5–5.3)
RBC # BLD: 4.75 M/UL — SIGNIFICANT CHANGE UP (ref 3.8–5.2)
RBC # FLD: 13.2 % — SIGNIFICANT CHANGE UP (ref 10.3–14.5)
SODIUM SERPL-SCNC: 136 MMOL/L — SIGNIFICANT CHANGE UP (ref 135–145)
WBC # BLD: 5.42 K/UL — SIGNIFICANT CHANGE UP (ref 3.8–10.5)
WBC # FLD AUTO: 5.42 K/UL — SIGNIFICANT CHANGE UP (ref 3.8–10.5)

## 2021-12-06 PROCEDURE — 99233 SBSQ HOSP IP/OBS HIGH 50: CPT

## 2021-12-06 PROCEDURE — 99233 SBSQ HOSP IP/OBS HIGH 50: CPT | Mod: GC

## 2021-12-06 RX ORDER — METOPROLOL TARTRATE 50 MG
50 TABLET ORAL DAILY
Refills: 0 | Status: DISCONTINUED | OUTPATIENT
Start: 2021-12-07 | End: 2021-12-09

## 2021-12-06 RX ORDER — MAGNESIUM SULFATE 500 MG/ML
1 VIAL (ML) INJECTION ONCE
Refills: 0 | Status: COMPLETED | OUTPATIENT
Start: 2021-12-06 | End: 2021-12-06

## 2021-12-06 RX ORDER — TRAMADOL HYDROCHLORIDE 50 MG/1
50 TABLET ORAL DAILY
Refills: 0 | Status: DISCONTINUED | OUTPATIENT
Start: 2021-12-06 | End: 2021-12-09

## 2021-12-06 RX ORDER — SODIUM CHLORIDE 9 MG/ML
1000 INJECTION, SOLUTION INTRAVENOUS
Refills: 0 | Status: DISCONTINUED | OUTPATIENT
Start: 2021-12-06 | End: 2021-12-07

## 2021-12-06 RX ADMIN — ATORVASTATIN CALCIUM 10 MILLIGRAM(S): 80 TABLET, FILM COATED ORAL at 21:44

## 2021-12-06 RX ADMIN — Medication 1 APPLICATION(S): at 08:04

## 2021-12-06 RX ADMIN — Medication 25 MILLIGRAM(S): at 06:58

## 2021-12-06 RX ADMIN — Medication 100 GRAM(S): at 12:30

## 2021-12-06 RX ADMIN — PREGABALIN 1000 MICROGRAM(S): 225 CAPSULE ORAL at 12:32

## 2021-12-06 RX ADMIN — Medication 400 UNIT(S): at 23:49

## 2021-12-06 RX ADMIN — LORATADINE 10 MILLIGRAM(S): 10 TABLET ORAL at 12:31

## 2021-12-06 RX ADMIN — CELECOXIB 100 MILLIGRAM(S): 200 CAPSULE ORAL at 06:57

## 2021-12-06 RX ADMIN — SODIUM CHLORIDE 150 MILLILITER(S): 9 INJECTION, SOLUTION INTRAVENOUS at 06:57

## 2021-12-06 RX ADMIN — MIRTAZAPINE 45 MILLIGRAM(S): 45 TABLET, ORALLY DISINTEGRATING ORAL at 21:44

## 2021-12-06 RX ADMIN — Medication 400 UNIT(S): at 12:31

## 2021-12-06 RX ADMIN — CELECOXIB 100 MILLIGRAM(S): 200 CAPSULE ORAL at 07:57

## 2021-12-06 RX ADMIN — PANTOPRAZOLE SODIUM 40 MILLIGRAM(S): 20 TABLET, DELAYED RELEASE ORAL at 06:58

## 2021-12-06 RX ADMIN — Medication 325 MILLIGRAM(S): at 12:31

## 2021-12-06 RX ADMIN — Medication 500 MILLIGRAM(S): at 14:11

## 2021-12-06 RX ADMIN — SENNA PLUS 2 TABLET(S): 8.6 TABLET ORAL at 21:39

## 2021-12-06 RX ADMIN — Medication 650 MILLIGRAM(S): at 00:48

## 2021-12-06 RX ADMIN — SODIUM CHLORIDE 75 MILLILITER(S): 9 INJECTION, SOLUTION INTRAVENOUS at 15:09

## 2021-12-06 RX ADMIN — Medication 1 MILLIGRAM(S): at 14:11

## 2021-12-06 RX ADMIN — QUETIAPINE FUMARATE 200 MILLIGRAM(S): 200 TABLET, FILM COATED ORAL at 21:43

## 2021-12-06 RX ADMIN — Medication 1 TABLET(S): at 06:58

## 2021-12-06 RX ADMIN — Medication 1 TABLET(S): at 14:12

## 2021-12-06 NOTE — DISCHARGE NOTE PROVIDER - NSDCMRMEDTOKEN_GEN_ALL_CORE_FT
albuterol 90 mcg/inh inhalation aerosol: 2 puff(s) inhaled every 6 hours, As needed, Shortness of Breath  amLODIPine 5 mg oral tablet: 1 tab(s) orally once a day  ascorbic acid 500 mg oral tablet: 1 tab(s) orally once a day  atorvastatin 10 mg oral tablet: 1 tab(s) orally once a day (at bedtime)  calcium carbonate 500 mg (200 mg elemental calcium) oral tablet, chewable: 1 tab(s) orally 2 times a day  celecoxib 100 mg oral capsule: 1 cap(s) orally 2 times a day  cholecalciferol oral tablet: 400 unit(s) orally 2 times a day  cyanocobalamin 1000 mcg oral tablet: 1 tab(s) orally once a day  ferrous sulfate 325 mg (65 mg elemental iron) oral tablet: 1 tab(s) orally once a day  folic acid 1 mg oral tablet: 1 tab(s) orally once a day  loratadine 10 mg oral tablet: 1 tab(s) orally once a day  metoprolol succinate 50 mg oral tablet, extended release: 1 tab(s) orally once a day  mirtazapine 45 mg oral tablet: 1 tab(s) orally once a day (at bedtime)  Multiple Vitamins oral tablet: 1 tab(s) orally once a day  pantoprazole 40 mg oral delayed release tablet: 1 tab(s) orally once a day (before a meal)  SEROquel 200 mg oral tablet: 1 tab(s) orally once a day (at bedtime)  traMADol 50 mg oral tablet: 1 tab(s) orally once a day, As Needed -Moderate Pain (4 - 6) MDD:Max of one tablet daily  traZODone 50 mg oral tablet: 1 tab(s) orally once a day (at bedtime)   Advair Diskus 250 mcg-50 mcg inhalation powder: 1 puff(s) inhaled 2 times a day  albuterol 90 mcg/inh inhalation aerosol: 2 puff(s) inhaled every 6 hours, As needed, Shortness of Breath  amLODIPine 5 mg oral tablet: 1 tab(s) orally once a day  ammonium lactate 12% topical lotion: Apply topically to affected area once a day  ascorbic acid 500 mg oral tablet: 1 tab(s) orally once a day  atorvastatin 10 mg oral tablet: 1 tab(s) orally once a day (at bedtime)  calcium carbonate 500 mg (200 mg elemental calcium) oral tablet, chewable: 1 tab(s) orally 2 times a day  celecoxib 100 mg oral capsule: 1 cap(s) orally 2 times a day  cholecalciferol oral tablet: 400 unit(s) orally 2 times a day  cyanocobalamin 1000 mcg oral tablet: 1 tab(s) orally once a day  ferrous sulfate 325 mg (65 mg elemental iron) oral tablet: 1 tab(s) orally once a day  folic acid 1 mg oral tablet: 1 tab(s) orally once a day  lidocaine 5% patch: Apply topically to affected area once a day  loratadine 10 mg oral tablet: 1 tab(s) orally once a day  metoprolol succinate 50 mg oral tablet, extended release: 1 tab(s) orally once a day  mirtazapine 45 mg oral tablet: 1 tab(s) orally once a day (at bedtime)  Multiple Vitamins oral tablet: 1 tab(s) orally once a day  pantoprazole 40 mg oral delayed release tablet: 1 tab(s) orally once a day (before a meal)  polyethylene glycol 3350 oral powder for reconstitution: 17 gram(s) orally every 24 hours  senna oral tablet: 2 tab(s) orally once a day (at bedtime)  SEROquel 200 mg oral tablet: 1 tab(s) orally once a day (at bedtime)  traMADol 50 mg oral tablet: 1 tab(s) orally once a day, As Needed -Moderate Pain (4 - 6) MDD:Max of one tablet daily  traZODone 50 mg oral tablet: 1 tab(s) orally once a day (at bedtime)   Advair Diskus 250 mcg-50 mcg inhalation powder: 1 puff(s) inhaled 2 times a day  albuterol 90 mcg/inh inhalation aerosol: 2 puff(s) inhaled every 6 hours, As needed, Shortness of Breath  ammonium lactate 12% topical lotion: Apply topically to affected area once a day  ascorbic acid 500 mg oral tablet: 1 tab(s) orally once a day  atorvastatin 10 mg oral tablet: 1 tab(s) orally once a day (at bedtime)  calcium carbonate 500 mg (200 mg elemental calcium) oral tablet, chewable: 1 tab(s) orally 2 times a day  celecoxib 100 mg oral capsule: 1 cap(s) orally 2 times a day  cholecalciferol oral tablet: 400 unit(s) orally 2 times a day  cyanocobalamin 1000 mcg oral tablet: 1 tab(s) orally once a day  ferrous sulfate 325 mg (65 mg elemental iron) oral tablet: 1 tab(s) orally once a day  folic acid 1 mg oral tablet: 1 tab(s) orally once a day  lidocaine 5% patch: Apply topically to affected area once a day  loratadine 10 mg oral tablet: 1 tab(s) orally once a day  metoprolol succinate 50 mg oral tablet, extended release: 1 tab(s) orally once a day  mirtazapine 45 mg oral tablet: 1 tab(s) orally once a day (at bedtime)  Multiple Vitamins oral tablet: 1 tab(s) orally once a day  pantoprazole 40 mg oral delayed release tablet: 1 tab(s) orally once a day (before a meal)  polyethylene glycol 3350 oral powder for reconstitution: 17 gram(s) orally every 24 hours  senna oral tablet: 2 tab(s) orally once a day (at bedtime)  SEROquel 200 mg oral tablet: 1 tab(s) orally once a day (at bedtime)  traMADol 50 mg oral tablet: 1 tab(s) orally once a day, As Needed -Moderate Pain (4 - 6) MDD:Max of one tablet daily  traZODone 50 mg oral tablet: 1 tab(s) orally once a day (at bedtime)

## 2021-12-06 NOTE — DIETITIAN INITIAL EVALUATION ADULT. - PROBLEM SELECTOR PLAN 2
Patient presenting for fall with head trauma. Patient has had multiple falls, one fall 4 days ago and two falls the subsequent day. CT head, cervical, lumbar negative for any acute processes. On exam AAO x3, no evidence of focal deficits, no sensory deficits. Patient with worsening crowding of toes, no h/o seeing podiatrist. Likely 2/2 to hypokalemia vs. polypharmacy vs. muscle weakness/problems ambulating, less likely neurogenic or cardiogenic as no acute intracranial findings or cardiac symptoms.     - PT evaluation   - hold BP medications, as normotensive on presentation  - f/u orthostatic vitals    - avoid sedating agents, hold tramadol, tylenol PRN escalate as needed  - podiatry consult

## 2021-12-06 NOTE — DIETITIAN INITIAL EVALUATION ADULT. - PROBLEM SELECTOR PLAN 6
Patient does not remember last BM, has poor PO intake. Abdominal exam with no tenderness but fullness. Evidence of stool burden on CT.     - c/w bowel regimen of senna, miralax

## 2021-12-06 NOTE — DIETITIAN INITIAL EVALUATION ADULT. - PROBLEM SELECTOR PLAN 7
Patient with PMH of hypertension on amlodipine and metoprolol. Normotensive on admission. Unclear why patient is on metoprolol (does not state prior cardiac history).    - holding metoprolol and amlodipine i/s/o falls and weakness  - f/u outpatient provider as to why on metoprolol

## 2021-12-06 NOTE — PROGRESS NOTE ADULT - SUBJECTIVE AND OBJECTIVE BOX
Cardiology Consult    O/N:  Interval History:  Telemetry:    OBJECTIVE  Vitals:  T(C): 36.7 (12-06-21 @ 05:29), Max: 37.6 (12-05-21 @ 21:03)  HR: 93 (12-06-21 @ 05:29) (93 - 115)  BP: 135/83 (12-06-21 @ 05:29) (135/83 - 153/96)  RR: 18 (12-06-21 @ 05:29) (18 - 18)  SpO2: 96% (12-06-21 @ 05:29) (93% - 96%)  Wt(kg): --    I/O:  I&O's Summary    05 Dec 2021 07:01  -  06 Dec 2021 07:00  --------------------------------------------------------  IN: 1350 mL / OUT: 2300 mL / NET: -950 mL        PHYSICAL EXAM:  Appearance: NAD. Speaking in full sentences.   HEENT: No pallor noted.  Conjunctiva clear b/l. Moist oral mucosa.  Cardiovascular: RRR with no murmurs.  Respiratory: Lungs CTAB.   Gastrointestinal:  Soft, nontender. Non-distended. Non-rigid.	  Extremities: No edema b/l. No erythema b/l. LE WWP b/l.  Vascular: DP intact  Neurologic:  Alert and awake. Moving all extremities. Following commands.   	  LABS:                        15.1   5.42  )-----------( 255      ( 06 Dec 2021 06:14 )             47.3     12-06    136  |  102  |  4<L>  ----------------------------<  95  4.2   |  25  |  0.66    Ca    8.9      06 Dec 2021 06:14  Phos  2.5     12-06  Mg     1.8     12-06    TPro  6.9  /  Alb  3.8  /  TBili  0.8  /  DBili  x   /  AST  298<H>  /  ALT  90<H>  /  AlkPhos  76  12-05          RADIOLOGY & ADDITIONAL TESTS:  Reviewed .    MEDICATIONS  (STANDING):  ammonium lactate 12% Lotion 1 Application(s) Topical <User Schedule>  ascorbic acid 500 milliGRAM(s) Oral daily  atorvastatin 10 milliGRAM(s) Oral at bedtime  calcium carbonate    500 mG (Tums) Chewable 1 Tablet(s) Chew two times a day  celecoxib 100 milliGRAM(s) Oral two times a day  cholecalciferol 400 Unit(s) Oral every 12 hours  cyanocobalamin 1000 MICROGram(s) Oral daily  enoxaparin Injectable 40 milliGRAM(s) SubCutaneous every 24 hours  ferrous    sulfate 325 milliGRAM(s) Oral daily  folic acid 1 milliGRAM(s) Oral daily  lactated ringers. 1000 milliLiter(s) (75 mL/Hr) IV Continuous <Continuous>  loratadine 10 milliGRAM(s) Oral daily  mirtazapine 45 milliGRAM(s) Oral at bedtime  multivitamin 1 Tablet(s) Oral daily  pantoprazole    Tablet 40 milliGRAM(s) Oral before breakfast  polyethylene glycol 3350 17 Gram(s) Oral every 24 hours  QUEtiapine 200 milliGRAM(s) Oral at bedtime  senna 2 Tablet(s) Oral at bedtime    MEDICATIONS  (PRN):  acetaminophen     Tablet .. 650 milliGRAM(s) Oral every 6 hours PRN Mild Pain (1 - 3)  albuterol/ipratropium for Nebulization 3 milliLiter(s) Nebulizer every 4 hours PRN Shortness of Breath and/or Wheezing  traMADol 50 milliGRAM(s) Oral daily PRN Severe Pain (7 - 10)     Cardiology Consult    O/N: HELEN  Interval History: No chest pain or shortness of breath.    OBJECTIVE  Vitals:  T(C): 36.7 (12-06-21 @ 05:29), Max: 37.6 (12-05-21 @ 21:03)  HR: 93 (12-06-21 @ 05:29) (93 - 115)  BP: 135/83 (12-06-21 @ 05:29) (135/83 - 153/96)  RR: 18 (12-06-21 @ 05:29) (18 - 18)  SpO2: 96% (12-06-21 @ 05:29) (93% - 96%)  Wt(kg): --    I/O:  I&O's Summary    05 Dec 2021 07:01  -  06 Dec 2021 07:00  --------------------------------------------------------  IN: 1350 mL / OUT: 2300 mL / NET: -950 mL        PHYSICAL EXAM:  Appearance: NAD. Speaking in full sentences.   HEENT: No JVD  Cardiovascular: RRR with no murmurs.  Respiratory: Lungs CTAB.	  Extremities: No edema b/l.   Neurologic:  Alert and awake  	  LABS:                        15.1   5.42  )-----------( 255      ( 06 Dec 2021 06:14 )             47.3     12-06    136  |  102  |  4<L>  ----------------------------<  95  4.2   |  25  |  0.66    Ca    8.9      06 Dec 2021 06:14  Phos  2.5     12-06  Mg     1.8     12-06    TPro  6.9  /  Alb  3.8  /  TBili  0.8  /  DBili  x   /  AST  298<H>  /  ALT  90<H>  /  AlkPhos  76  12-05          RADIOLOGY & ADDITIONAL TESTS:  Reviewed .    MEDICATIONS  (STANDING):  ammonium lactate 12% Lotion 1 Application(s) Topical <User Schedule>  ascorbic acid 500 milliGRAM(s) Oral daily  atorvastatin 10 milliGRAM(s) Oral at bedtime  calcium carbonate    500 mG (Tums) Chewable 1 Tablet(s) Chew two times a day  celecoxib 100 milliGRAM(s) Oral two times a day  cholecalciferol 400 Unit(s) Oral every 12 hours  cyanocobalamin 1000 MICROGram(s) Oral daily  enoxaparin Injectable 40 milliGRAM(s) SubCutaneous every 24 hours  ferrous    sulfate 325 milliGRAM(s) Oral daily  folic acid 1 milliGRAM(s) Oral daily  lactated ringers. 1000 milliLiter(s) (75 mL/Hr) IV Continuous <Continuous>  loratadine 10 milliGRAM(s) Oral daily  mirtazapine 45 milliGRAM(s) Oral at bedtime  multivitamin 1 Tablet(s) Oral daily  pantoprazole    Tablet 40 milliGRAM(s) Oral before breakfast  polyethylene glycol 3350 17 Gram(s) Oral every 24 hours  QUEtiapine 200 milliGRAM(s) Oral at bedtime  senna 2 Tablet(s) Oral at bedtime    MEDICATIONS  (PRN):  acetaminophen     Tablet .. 650 milliGRAM(s) Oral every 6 hours PRN Mild Pain (1 - 3)  albuterol/ipratropium for Nebulization 3 milliLiter(s) Nebulizer every 4 hours PRN Shortness of Breath and/or Wheezing  traMADol 50 milliGRAM(s) Oral daily PRN Severe Pain (7 - 10)

## 2021-12-06 NOTE — PROGRESS NOTE ADULT - ATTENDING COMMENTS
pt seen and examined by me case d/w housestaff agree with VS, PE, assessment and plan as above with following additives    pt feels well no acute complaints  exam is unchanged.    Pt is medically ready for dc.  pending LEONARD

## 2021-12-06 NOTE — PROGRESS NOTE ADULT - ASSESSMENT
72 yo F with PMH of anxiety, MDD, HTN, high cholesterol, COPD, b/l ankle/knee fracture (4 years ago at [facility]), scoliosis, presenting for fall 2 days ago and leg weakness, found to have hypokalemia (now resolved), rhabdomyolysis (resolving), and occasional sinus tachycardia.

## 2021-12-06 NOTE — DISCHARGE NOTE PROVIDER - NSDCCPCAREPLAN_GEN_ALL_CORE_FT
PRINCIPAL DISCHARGE DIAGNOSIS  Diagnosis: Fall  Assessment and Plan of Treatment: You came into the hospital after a fall at home. Physical Therapy has worked with you and recommended subacute rehab, where you can work to regain strength and functionality. It is important to be conscientious of your surroundings but do not hesitate to ask for your help. Another fall could potentially be dangerous to your health and it is important to find ways to prevent that.      SECONDARY DISCHARGE DIAGNOSES  Diagnosis: Back pain  Assessment and Plan of Treatment: Your back pain is due to your history of scoliosis. Please continue to take your lidocaine patch and tramadol as prescribed. If your back pain becomes severe despite tramadol use, please see a doctor.    Diagnosis: Exposure to COVID-19 virus  Assessment and Plan of Treatment: You were exposed to COVID-19 virus on 10/3 during your stay at the hospital. However, your subsequent COVID test was negative, and you have not shown any signs of infection. You will continue to be monitored at subacute rehab.     PRINCIPAL DISCHARGE DIAGNOSIS  Diagnosis: Fall  Assessment and Plan of Treatment: You came into the hospital after a fall at home. Physical Therapy has worked with you and recommended subacute rehab, where you can work to regain strength and functionality. It is important to be conscientious of your surroundings but do not hesitate to ask for your help. Another fall could potentially be dangerous to your health and it is important to find ways to prevent that.  Please follow up with your primary care physician, Dr. Andre, on 1/05/22 at 11:45AM. Dr. Morfin office will call you if an earlier appointment time becomes available. Please try to follow up with him as soon as possible.      SECONDARY DISCHARGE DIAGNOSES  Diagnosis: Back pain  Assessment and Plan of Treatment: Your back pain is due to your history of scoliosis. Please continue to take your lidocaine patch and tramadol as prescribed. If your back pain becomes severe despite tramadol use, please see a doctor.    Diagnosis: Exposure to COVID-19 virus  Assessment and Plan of Treatment: You were exposed to COVID-19 virus on 10/3 during your stay at the hospital. However, your subsequent COVID test was negative, and you have not shown any signs of infection. You will continue to be monitored at subacute rehab.     PRINCIPAL DISCHARGE DIAGNOSIS  Diagnosis: Fall  Assessment and Plan of Treatment: You came into the hospital after a fall at home. Physical Therapy has worked with you and recommended subacute rehab, where you can work to regain strength and functionality. It is important to be conscientious of your surroundings but do not hesitate to ask for your help. Another fall could potentially be dangerous to your health and it is important to find ways to prevent that.  Please follow up with your primary care physician, Dr. Andre, on 1/05/22 at 11:45AM. Dr. Morfin office will call you if an earlier appointment time becomes available. Please try to follow up with him as soon as possible.      SECONDARY DISCHARGE DIAGNOSES  Diagnosis: Hypertension  Assessment and Plan of Treatment: You have a history of hypertension. We have STOPPED your amlodipine medication due to your pressures being normal. Please continue to take your Toprol (metoprolol) mediation as prescribed. Please follow up with your primary care provider, Dr. Andre, for follow-up.    Diagnosis: Back pain  Assessment and Plan of Treatment: Your back pain is due to your history of scoliosis. Please continue to take your lidocaine patch and tramadol as prescribed. If your back pain becomes severe despite tramadol use, please see a doctor.    Diagnosis: Exposure to COVID-19 virus  Assessment and Plan of Treatment: You were exposed to COVID-19 virus on 10/3 during your stay at the hospital. However, your subsequent COVID test was negative, and you have not shown any signs of infection. You will continue to be monitored at subacute rehab.

## 2021-12-06 NOTE — DIETITIAN INITIAL EVALUATION ADULT. - PROBLEM SELECTOR PLAN 5
Patient with history of MDD, on Seroquel 150 mg at bedtime. Has not been taking medications, unclear reasoning, seems as though needed prescription to be filled. Endorses depressed mood, anhedonia, decreased PO intake, increased lethargy, psychomotor retardation.    - c/w quetiapine 150 mg at bedtime   - f/u outpatient PCP

## 2021-12-06 NOTE — DISCHARGE NOTE PROVIDER - CARE PROVIDER_API CALL
Dylan Andre  INTERNAL MEDICINE  [address] [address]  Phone: [phone]  Fax: [phone]  Established Patient  Scheduled Appointment: 01/05/2022 11:45 AM

## 2021-12-06 NOTE — DIETITIAN INITIAL EVALUATION ADULT. - PERTINENT MEDS FT
Lovenox,Toprol,MVI,Senna,Claritin,FeSo4,Protonix,Miralax,Vitamin C,, lipitor,TUMs,Celebrex,Vitamin D.Folic Acid.Remeron

## 2021-12-06 NOTE — DISCHARGE NOTE PROVIDER - NSDCCPTREATMENT_GEN_ALL_CORE_FT
PRINCIPAL PROCEDURE  Procedure: CT head wo con  Findings and Treatment:   < end of copied text >  FINDINGS:  VENTRICLES ANDSULCI: There is ventricular and sulcal prominence consistent with generalized age related cerebral volume loss. No hydrocephalus.  INTRA-AXIAL: No intracranial mass, acute hemorrhage, or midline shift is present. No acute transcortical infarction. There are scattered hypodensities throughout the periventricular white matter, likely the sequela of chronic small vessel ischemic disease.  EXTRA-AXIAL: No extra-axial fluid collection is present.  VISUALIZED SINUSES: No air-fluid levels are identified.  VISUALIZED MASTOIDS: Well aerated.  CALVARIUM: No fracture.  MISCELLANEOUS:  None.  IMPRESSION:  1.  No acute intracranial hemorrhage or calvarial fracture.  2.  Chronic microvascular ischemic disease.< from: CT Head No Cont (12.03.21 @ 01:55) >        SECONDARY PROCEDURE  Procedure: CT cervical spine  Findings and Treatment:   < end of copied text >  Reversal of cervical lordosis. Odontoid process is intact. No jumped facets. Multilevel osseous fusion involving the intra-articular facets of the T1-2 and T2-3 levels. Multilevel loss of intervertebral disc height. Multilevel anterior endplate osteophytosis. No compression fracture deformity. No listhesis. No lytic or blastic lesion. Atlantoaxial articulation is intact. Multilevel degenerative neural foraminal narrowing secondary to bony uncovertebral and facet osteoarthrosis. No prevertebral edema. Craniocervical junction is patent. Asymmetric enlargement of the left thyroid lobe containing a few low attenuating lesions. Further evaluation nonemergent obtained if clinically warranted. Paraseptal and centrilobular emphysematous changes within the visualized aspects of the right and left lung apex.  IMPRESSION:  No acute fracture or traumatic malalignment appreciated.  Chronic multilevel degenerative changes.< from: CT Cervical Spine No Cont (12.03.21 @ 01:55) >      Procedure: CT lumbar spine  Findings and Treatment:   < end of copied text >  FINDINGS: Dextroscoliosis of the thoracolumbar spine. Straightening of lordosis. Extensive osseous fusion involving the posterior elements of the and thoracolumbar spine. No compression fracture. No listhesis. There is osteopenic/osteoporotic changes with osseous demineralization. The transverse processesare intact.  No intramuscular hematoma or edema involving the right or left psoas muscle. Mild asymmetric atrophy of the right psoas muscle. No paravertebral mass. The dorsal soft tissues are grossly unremarkable.  Disc bulge eccentric towards the left with ligamentum flavum thickening at L3-4. Findings result in mild central canal and subarticular zone stenosis. Severe left and mild right foraminal narrowing.  Broad-based diffuse disc bulge with ligamentum flavum thickening at L4-5. Findings result in moderate central canal and subarticular zone stenosis. Moderate bilateral foraminal narrowing.  Small broad disc bulge at L5-S1. Findings result in mild canal and subarticular zone stenosis. Mild bilateral foraminal narrowing.  Included lower abdominal viscera demonstrates no hydronephrosis. No focal adrenal lesion. Moderately distended urinary bladder. Stool-filled loops of large bowel. The uterus appears within normal limits. There is concentric arthrosclerotic calcification involving the abdominal aorta and bilateral common iliac arteries.  Included lung bases demonstrate diffuse centrilobular emphysema.  IMPRESSION:  1.  No acute bony fracture or traumatic malalignment.  2.  Bamboo spine appearance which could be seen with ankylosing spondylitis.  3.  Multilevel degenerative changes.< from: CT Lumbar Spine No Cont (12.03.21 @ 01:55) >

## 2021-12-06 NOTE — DISCHARGE NOTE PROVIDER - PROVIDER TOKENS
PROVIDER:[TOKEN:[[id number]],SCHEDULEDAPPT:[01/05/2022],SCHEDULEDAPPTTIME:[11:45 AM],ESTABLISHEDPATIENT:[T]]

## 2021-12-06 NOTE — PROGRESS NOTE ADULT - ASSESSMENT
ASSESSMENT:  71F with PMH of anxiety, depression, HTN, high cholesterol, COPD, b/l ankle/knee fracture (4 years ago at [facility]),  presented for fall and leg weakness, found to have hypokalemia and rhabdomyolysis. Cardiology was consulted for sinus tachycardia.     PLAN:  Sinus Tachycardia  Noted sinus tachycardia, in setting of electrolyte abnormalities, dehdration, rhabdo and patient on beta blocker at home and was held and since resumed with improvement.   TTE (12/3): LVH, hyperdynamic LV function, tachycardiac during exam, PASP 31mmHg  - Continue Toprol home dose  - No further cardiac work up indicated at this time  - Continue to monitor electrolytes and replete to maintain K>4 and Mg>2    Discussed with consult attending, Dr. Jacobs. Cardiology consult will sign off at this time reconsult if any new clinical questions/changes.

## 2021-12-06 NOTE — DIETITIAN INITIAL EVALUATION ADULT. - ENTER FROM (CAL/KG)
MEDHAT JANET  : 1942 08:31:42  ACCOUNT:  [id number]  HOME PHONE:  [phone]  WORK PHONE:     pt returned call yesterday and was informed to follow up with primary doctor for referral on echocardiogram due to his insurance per silvano blakely/dev    
Lab Facility: [id number]
Lab Facility: [id number]
[id number]

## 2021-12-06 NOTE — PROGRESS NOTE ADULT - SUBJECTIVE AND OBJECTIVE BOX
OVERNIGHT EVENTS: No acute events overnight.     SUBJECTIVE / INTERVAL HPI: Patient seen and examined at bedside. Pt has no complaints other than feeling her toe nails are too long. Pt reports chronic midline back pain from scoliosis. Denies fever, palpitations, anxiety, shortness of breath.    VITAL SIGNS:  Vital Signs Last 24 Hrs  T(C): 36.8 (06 Dec 2021 15:13), Max: 37.6 (05 Dec 2021 21:03)  T(F): 98.2 (06 Dec 2021 15:13), Max: 99.6 (05 Dec 2021 21:03)  HR: 97 (06 Dec 2021 15:13) (93 - 97)  BP: 132/70 (06 Dec 2021 15:13) (132/70 - 143/92)  BP(mean): --  RR: 18 (06 Dec 2021 15:13) (18 - 18)  SpO2: 96% (06 Dec 2021 15:13) (94% - 96%)    PHYSICAL EXAM:  GENERAL: NAD, lying in bed, low mood, psychomotor retardation  HEAD:  Atraumatic, Normocephalic  EYES: EOMI, PERRLA, conjunctiva and sclera clear  ENT: dry mucous membranes  CHEST/LUNG: Clear to auscultation bilaterally; No rales, rhonchi, wheezing, or rubs.   HEART: Regular rate and rhythm; No murmurs, rubs, or gallops  ABDOMEN: Bowel sounds present; soft, large abdomen, fullness in LLQ, non-tender.   EXTREMITIES:  2+ radial pulses, +1 DP pulses.  NERVOUS SYSTEM:  Alert & Oriented X3, speech difficult to understand, low volume w/ fast speech. No focal deficits.  MSK: 4/5 strength in b/l hips, 5/5 b/l knee and ankle strength. sensation intact in LE b/l.    MEDICATIONS:  MEDICATIONS  (STANDING):  ammonium lactate 12% Lotion 1 Application(s) Topical <User Schedule>  ascorbic acid 500 milliGRAM(s) Oral daily  atorvastatin 10 milliGRAM(s) Oral at bedtime  calcium carbonate    500 mG (Tums) Chewable 1 Tablet(s) Chew two times a day  celecoxib 100 milliGRAM(s) Oral two times a day  cholecalciferol 400 Unit(s) Oral every 12 hours  cyanocobalamin 1000 MICROGram(s) Oral daily  enoxaparin Injectable 40 milliGRAM(s) SubCutaneous every 24 hours  ferrous    sulfate 325 milliGRAM(s) Oral daily  folic acid 1 milliGRAM(s) Oral daily  lactated ringers. 1000 milliLiter(s) (75 mL/Hr) IV Continuous <Continuous>  loratadine 10 milliGRAM(s) Oral daily  mirtazapine 45 milliGRAM(s) Oral at bedtime  multivitamin 1 Tablet(s) Oral daily  pantoprazole    Tablet 40 milliGRAM(s) Oral before breakfast  polyethylene glycol 3350 17 Gram(s) Oral every 24 hours  QUEtiapine 200 milliGRAM(s) Oral at bedtime  senna 2 Tablet(s) Oral at bedtime    MEDICATIONS  (PRN):  acetaminophen     Tablet .. 650 milliGRAM(s) Oral every 6 hours PRN Mild Pain (1 - 3)  albuterol/ipratropium for Nebulization 3 milliLiter(s) Nebulizer every 4 hours PRN Shortness of Breath and/or Wheezing  traMADol 50 milliGRAM(s) Oral daily PRN Severe Pain (7 - 10)      ALLERGIES:  Allergies    baclofen (Unknown)  BuSpar (Unknown)  Zyprexa (Unknown)    Intolerances        LABS:                        15.1   5.42  )-----------( 255      ( 06 Dec 2021 06:14 )             47.3     12-06    136  |  102  |  4<L>  ----------------------------<  95  4.2   |  25  |  0.66    Ca    8.9      06 Dec 2021 06:14  Phos  2.5     12-06  Mg     1.8     12-06    TPro  6.9  /  Alb  3.8  /  TBili  0.8  /  DBili  x   /  AST  298<H>  /  ALT  90<H>  /  AlkPhos  76  12-05        CAPILLARY BLOOD GLUCOSE      POCT Blood Glucose.: 86 mg/dL (06 Dec 2021 09:38)      RADIOLOGY & ADDITIONAL TESTS: Reviewed.

## 2021-12-06 NOTE — DIETITIAN INITIAL EVALUATION ADULT. - PROBLEM SELECTOR PLAN 4
Patient with appropriate medications per [facility] list (quetiapine, trazodone, mirtazapine, loratadine), however currently with tramadol and on hypertensive medications although she was normotensive and had poor PO intake prior to admission.     - hold tramadol at present, discuss with PCP  - c/w tylenol 650 mg PRN for pain

## 2021-12-06 NOTE — DIETITIAN INITIAL EVALUATION ADULT. - PROBLEM SELECTOR PLAN 8
Patient with history of COPD/emphysema. Has albuterol inhaler at home. No evidence of worsening respiratory status.     - duonebs PRN  - continue to monitor

## 2021-12-06 NOTE — DIETITIAN INITIAL EVALUATION ADULT. - ORAL INTAKE PTA/DIET HISTORY
Very limited recall.Per patient, has had poor po "for years".Noted weight 2016:62.5kg,Present weight:60.8kg.ONly avoidance is lactose.No specific restrictions maintained.Resides alone and buys own food and eats 'When I'm hungry".No food specific information.

## 2021-12-06 NOTE — DISCHARGE NOTE PROVIDER - NSDCFUADDAPPT_GEN_ALL_CORE_FT
Please follow up with your primary care physician. We have scheduled a primary care appointment with Dr. Andre on 1/05/22 at 11:45AM. This was the earliest date available. However, if an earlier appointment date opens up, Dr. Andre's office will call you at [phone] in order to schedule an earlier appointment. Please call Dr. Andre's office at [phone] to confirm your appointment.

## 2021-12-06 NOTE — PROGRESS NOTE ADULT - PROBLEM SELECTOR PLAN 3
CK > 14k on admission. Likely 2/2 trauma from multiple recent falls. No evidence of FALUGNI. Downtrending.  - C/w LR 75cc/hr  - Lung checks q12h  - Trend CPK daily    #Urinary retention (RESOLVED)  While on fluids for rhabdo, bladder scan >600, failure to straight cath x2, required urology to place lerma.  - Passed TOV

## 2021-12-06 NOTE — DIETITIAN INITIAL EVALUATION ADULT. - PROBLEM SELECTOR PLAN 1
Patient hypokalemic to 2.8 on admission, has muscle weakness and felling of "legs giving out," i/s/o decreased PO intake. Muscle weakness typically occurs around K of 2.5, but can happen at higher values if acute. Evidence of myoglobinuria. No EKG changes, however does have prolonged QT.     - replete as K<4  - f/u EKG   - no evidence of arrythmia at present however continue to monitor

## 2021-12-06 NOTE — DIETITIAN INITIAL EVALUATION ADULT. - OTHER INFO
72 yo F with PMH of anxiety, MDD, HTN, high cholesterol, COPD, b/l ankle/knee fracture (4 years ago at [facility]), scoliosis, presenting for fall 2 days ago and leg weakness, found to have hypokalemia (now resolved), rhabdomyolysis (resolving), and occasional sinus tachycardia .Patient reported she still has no appetite. Denied N/V or pain. Constipation generally, but had "loose stools" this am. Skin intact .Patient is 101% of IBW .Claims UBW:128lbs.Noted weight July 2016 [facility] admission:137lbs,Present weight:134lbs.IBW:125lbs,BMI:22.3.Patient reportedly eating <50%.Per RN, eating less than <75%.RD encouraged patient to order increased fiber and fluid sources for bowel regimen.

## 2021-12-06 NOTE — PROGRESS NOTE ADULT - ATTENDING COMMENTS
Initial attending contact date  12/6/21    . See fellow note written above for details. I reviewed the fellow documentation. I have personally seen and examined this patient. I reviewed vitals, labs, medications, cardiac studies, and additional imaging. I agree with the above fellow's findings and plans as written above with the following additions/statements.    -Without active cardiac complaints, denies SOB, palpitations  -HR in 90s per vitals and improved after restarting toprol 25 mg qd  -ECHO with normal  LVEF   -Cont current toprol dose  -No need for further cardiac work up  -Pls reconsult as needed

## 2021-12-06 NOTE — DISCHARGE NOTE PROVIDER - HOSPITAL COURSE
#Discharge: do not delete    Patient is __ yo M/F with past medical history of _____, presented with _____, found to have _____    Inpatient treatment course:     Hospital course (by problem):     New medications/therapies:   New lines/hardware:  Labs to be followed outpatient:   Exam to be followed outpatient:     Discharge plan: discharge to     #Discharge: do not delete    72 yo F with PMH of anxiety, MDD, HTN, high cholesterol, COPD, b/l ankle/knee fracture (4 years ago at [facility]), scoliosis, presenting for fall 2 days ago and leg weakness, found to have hypokalemia (now resolved), rhabdomyolysis (resolving), and occasional sinus tachycardia.    Inpatient treatment course: Morphine 2mg IVP x1, LR 150mL/Hr for 2 days, LR 75mL/hr for 1 day.    Hospital course (by problem):   #Mechanical Fall.   Patient presenting for fall with head trauma. Patient has had multiple falls, one fall 4 days ago and two falls the subsequent day. CT head, cervical, lumbar negative for any acute processes. On exam AAO x3, no evidence of focal deficits, no sensory deficits. Patient with worsening crowding of toes, no h/o seeing podiatrist. Likely 2/2 to hypokalemia vs. polypharmacy vs. muscle weakness/problems ambulating, less likely neurogenic or cardiogenic as no acute intracranial findings or cardiac symptoms.   - Discharge to [facility]  - Toenails clipped by podiatry  -Ammonium lactate lotion daily, apply on top and bottom of feet but NOT between digits. Can cover feet w/ socks after application.    #COVID exposure  Exposed on 12/3. Placed on droplet precautions. No symptoms.  - Droplet precautions, continue to monitor for symptoms    #Paroxysmal sinus tachycardia w/ PACs (RESOLVED)  Despite correction of hypokalemia, pt w/ persistent occasional sinus tachycardia to >140s. Asymptomatic. Most likely dx is 2/2 anxiety, but pt does not overtly appear anxious. TSH WNL, low suspicion for PE or sepsis at this time. Lungs clear, no hypoxia. BP stable.  - EKG noted to show PACs during episodes of tachycardia, PACs absent during baseline HR of 90  - If persistently tachycardic, consider CT-PE.  - C/w Toprol 50 qd    #Rhabdomyolysis (RESOLVED)   CK > 14k on admission. Likely 2/2 trauma from multiple recent falls. No evidence of FALGUNI. Downtrending.  - S/p LR 150cc/hr and 75cc/hr  - No longer needed to be trended, last CPK 2000    #Urinary retention (RESOLVED)  While on fluids for rhabdo, bladder scan >600, failure to straight cath x2, required urology to place lerma.  - Passed TOV.    #Hypokalemia (RESOLVED)  Patient hypokalemic to 2.8 on admission, has muscle weakness and feeling of "legs giving out," i/s/o decreased PO intake. Muscle weakness typically occurs around K of 2.5, but can happen at higher values if acute. Evidence of myoglobinuria. No EKG changes, however does have prolonged QT (resolved).   - replete K<4.    #Emphysema lung.   Patient with history of COPD/emphysema. Has albuterol inhaler at home, and on Advair 250/50 BID. No evidence of worsening respiratory status.   - c/w albuterol PRN  - c/w Advair 250/50 bid     #Weakness.   Patient presenting for progressive weakness and acute weakness that precedes falls. Patient endorses poorer PO intake, weight loss, constipation. Appears dehydrated on labs and PE. Hypokalemia to 2.7. Has history of depression and endorses worsening mood affect. Mild psychomotor retardation. Weakness likely 2/2 to poor PO intake i/s/o worsening depression. UA with blood, no RBCs, indicating muscle breakdown.  - encourage PO fluid intake.    #Major depression.   Patient with history of MDD, on Seroquel 200 mg and mirtazapine at bedtime. Has not been taking medications, unclear reasoning, seems as though needed prescription to be filled. Endorses depressed mood, anhedonia, decreased PO intake, increased lethargy, psychomotor retardation.  - C/w home meds    #Constipation.   Patient does not remember last BM, has poor PO intake. Abdominal exam with no tenderness but fullness. Evidence of stool burden on CT.   - c/w bowel regimen of senna, miralax.    #Hypertension.   Patient with PMH of hypertension on amlodipine and metoprolol. Normotensive on admission. Unclear why patient is on metoprolol (does not state prior cardiac history).  - holding amlodipine i/s/o normotension  - C/w Toprol 50 qd.    New medications/therapies: Ammonium lactate lotion daily, senna, miralax.  New lines/hardware:  Labs to be followed outpatient:   Exam to be followed outpatient:     Discharge plan: discharge to     #Discharge: do not delete    72 yo F with PMH of anxiety, MDD, HTN, high cholesterol, COPD, b/l ankle/knee fracture (4 years ago at [facility]), scoliosis, presenting for fall 2 days ago and leg weakness, found to have hypokalemia (now resolved), rhabdomyolysis (resolving), and occasional sinus tachycardia.    Inpatient treatment course: Morphine 2mg IVP x1, LR 150mL/Hr for 2 days, LR 75mL/hr for 1 day.    Hospital course (by problem):   #Mechanical Fall.   Patient presenting for fall with head trauma. Patient has had multiple falls, one fall 4 days ago and two falls the subsequent day. CT head, cervical, lumbar negative for any acute processes. On exam AAO x3, no evidence of focal deficits, no sensory deficits. Patient with worsening crowding of toes, no h/o seeing podiatrist. Likely 2/2 to hypokalemia vs. polypharmacy vs. muscle weakness/problems ambulating, less likely neurogenic or cardiogenic as no acute intracranial findings or cardiac symptoms.   - Discharge to [facility]  - Toenails clipped by podiatry  -Ammonium lactate lotion daily, apply on top and bottom of feet but NOT between digits. Can cover feet w/ socks after application.    #COVID exposure  Exposed on 12/3. Placed on droplet precautions. No symptoms.  - Droplet precautions, continue to monitor for symptoms    #Paroxysmal sinus tachycardia w/ PACs (RESOLVED)  Despite correction of hypokalemia, pt w/ persistent occasional sinus tachycardia to >140s. Asymptomatic. Most likely dx is 2/2 anxiety, but pt does not overtly appear anxious. TSH WNL, low suspicion for PE or sepsis at this time. Lungs clear, no hypoxia. BP stable.  - EKG noted to show PACs during episodes of tachycardia, PACs absent during baseline HR of 90  - If persistently tachycardic, consider CT-PE.  - C/w Toprol 50 qd    #Rhabdomyolysis (RESOLVED)   CK > 14k on admission. Likely 2/2 trauma from multiple recent falls. No evidence of FALGUNI. Downtrending.  - S/p LR 150cc/hr and 75cc/hr  - No longer needed to be trended, last CPK 2000    #Urinary retention (RESOLVED)  While on fluids for rhabdo, bladder scan >600, failure to straight cath x2, required urology to place lerma.  - Passed TOV.    #Hypokalemia (RESOLVED)  Patient hypokalemic to 2.8 on admission, has muscle weakness and feeling of "legs giving out," i/s/o decreased PO intake. Muscle weakness typically occurs around K of 2.5, but can happen at higher values if acute. Evidence of myoglobinuria. No EKG changes, however does have prolonged QT (resolved).   - replete K<4.    #Emphysema lung.   Patient with history of COPD/emphysema. Has albuterol inhaler at home, and on Advair 250/50 BID. No evidence of worsening respiratory status.   - c/w albuterol PRN  - c/w Advair 250/50 bid     #Weakness.   Patient presenting for progressive weakness and acute weakness that precedes falls. Patient endorses poorer PO intake, weight loss, constipation. Appears dehydrated on labs and PE. Hypokalemia to 2.7. Has history of depression and endorses worsening mood affect. Mild psychomotor retardation. Weakness likely 2/2 to poor PO intake i/s/o worsening depression. UA with blood, no RBCs, indicating muscle breakdown.  - encourage PO fluid intake.    #Major depression.   Patient with history of MDD, on Seroquel 200 mg and mirtazapine at bedtime. Has not been taking medications, unclear reasoning, seems as though needed prescription to be filled. Endorses depressed mood, anhedonia, decreased PO intake, increased lethargy, psychomotor retardation.  - C/w home meds    #Constipation.   Patient does not remember last BM, has poor PO intake. Abdominal exam with no tenderness but fullness. Evidence of stool burden on CT.   - c/w bowel regimen of senna, miralax.    #Hypertension.   Patient with PMH of hypertension on amlodipine and metoprolol. Normotensive on admission. Unclear why patient is on metoprolol (does not state prior cardiac history).  - holding amlodipine i/s/o normotension  - C/w Toprol 50 qd.    New medications/therapies: Ammonium lactate lotion daily, senna, miralax.  New lines/hardware: None  Labs to be followed outpatient: None  Exam to be followed outpatient: None    Discharge plan: discharge to [facility]     #Discharge: do not delete    72 yo F with PMH of anxiety, MDD, HTN, high cholesterol, COPD, b/l ankle/knee fracture (4 years ago at [facility]), scoliosis, presenting for fall 2 days ago and leg weakness, found to have hypokalemia (now resolved), rhabdomyolysis (resolving), and occasional sinus tachycardia.    Inpatient treatment course: Morphine 2mg IVP x1, LR 150mL/Hr for 2 days, LR 75mL/hr for 1 day.    Hospital course (by problem):   #Mechanical Fall.   Patient presenting for fall with head trauma. Patient has had multiple falls, one fall 4 days ago and two falls the subsequent day. CT head, cervical, lumbar negative for any acute processes. On exam AAO x3, no evidence of focal deficits, no sensory deficits. Patient with worsening crowding of toes, no h/o seeing podiatrist. Likely 2/2 to hypokalemia vs. polypharmacy vs. muscle weakness/problems ambulating, less likely neurogenic or cardiogenic as no acute intracranial findings or cardiac symptoms.   - Discharge to [facility]  - Toenails clipped by podiatry  -Ammonium lactate lotion daily, apply on top and bottom of feet but NOT between digits. Can cover feet w/ socks after application.    #COVID exposure  Exposed on 12/3. Placed on droplet precautions. No symptoms.  - Droplet precautions, continue to monitor for symptoms    #Paroxysmal sinus tachycardia w/ PACs (RESOLVED)  Despite correction of hypokalemia, pt w/ persistent occasional sinus tachycardia to >140s. Asymptomatic. Most likely dx is 2/2 anxiety, but pt does not overtly appear anxious. TSH WNL, low suspicion for PE or sepsis at this time. Lungs clear, no hypoxia. BP stable.  - EKG noted to show PACs during episodes of tachycardia, PACs absent during baseline HR of 90  - If persistently tachycardic, consider CT-PE.  - C/w Toprol 50 qd    #Rhabdomyolysis (RESOLVED)   CK > 14k on admission. Likely 2/2 trauma from multiple recent falls. No evidence of FALGUNI. Downtrending.  - S/p LR 150cc/hr and 75cc/hr  - No longer needed to be trended, last CPK 2000    #Urinary retention (RESOLVED)  While on fluids for rhabdo, bladder scan >600, failure to straight cath x2, required urology to place lerma.  - Passed TOV.    #Hypokalemia (RESOLVED)  Patient hypokalemic to 2.8 on admission, has muscle weakness and feeling of "legs giving out," i/s/o decreased PO intake. Muscle weakness typically occurs around K of 2.5, but can happen at higher values if acute. Evidence of myoglobinuria. No EKG changes, however does have prolonged QT (resolved).   - replete K<4.    #Emphysema lung.   Patient with history of COPD/emphysema. Has albuterol inhaler at home, and on Advair 250/50 BID. No evidence of worsening respiratory status.   - c/w albuterol PRN  - c/w Advair 250/50 bid     #Weakness.   Patient presenting for progressive weakness and acute weakness that precedes falls. Patient endorses poorer PO intake, weight loss, constipation. Appears dehydrated on labs and PE. Hypokalemia to 2.7. Has history of depression and endorses worsening mood affect. Mild psychomotor retardation. Weakness likely 2/2 to poor PO intake i/s/o worsening depression. UA with blood, no RBCs, indicating muscle breakdown.  - encourage PO fluid intake.    #Major depression.   Patient with history of MDD, on Seroquel 200 mg and mirtazapine at bedtime. Has not been taking medications, unclear reasoning, seems as though needed prescription to be filled. Endorses depressed mood, anhedonia, decreased PO intake, increased lethargy, psychomotor retardation.  - C/w home meds    #Constipation.   Patient does not remember last BM, has poor PO intake. Abdominal exam with no tenderness but fullness. Evidence of stool burden on CT.   - c/w bowel regimen of senna, miralax.    #Hypertension.   Patient with PMH of hypertension on amlodipine and metoprolol. Normotensive on admission. Unclear why patient is on metoprolol (does not state prior cardiac history).  - holding amlodipine i/s/o normotension  - C/w Toprol 50 qd.    New medications/therapies: Ammonium lactate lotion daily, senna, miralax.  Medications stopped: STOPPED amlodipine  New lines/hardware: None  Labs to be followed outpatient: None  Exam to be followed outpatient: None    Discharge plan: discharge to [facility]

## 2021-12-07 LAB
ANION GAP SERPL CALC-SCNC: 11 MMOL/L — SIGNIFICANT CHANGE UP (ref 5–17)
BUN SERPL-MCNC: 6 MG/DL — LOW (ref 7–23)
CALCIUM SERPL-MCNC: 9.1 MG/DL — SIGNIFICANT CHANGE UP (ref 8.4–10.5)
CHLORIDE SERPL-SCNC: 104 MMOL/L — SIGNIFICANT CHANGE UP (ref 96–108)
CK SERPL-CCNC: 2322 U/L — HIGH (ref 25–170)
CO2 SERPL-SCNC: 24 MMOL/L — SIGNIFICANT CHANGE UP (ref 22–31)
CREAT SERPL-MCNC: 0.77 MG/DL — SIGNIFICANT CHANGE UP (ref 0.5–1.3)
GLUCOSE SERPL-MCNC: 105 MG/DL — HIGH (ref 70–99)
HCT VFR BLD CALC: 50.3 % — HIGH (ref 34.5–45)
HGB BLD-MCNC: 15.9 G/DL — HIGH (ref 11.5–15.5)
MAGNESIUM SERPL-MCNC: 1.8 MG/DL — SIGNIFICANT CHANGE UP (ref 1.6–2.6)
MCHC RBC-ENTMCNC: 31.6 GM/DL — LOW (ref 32–36)
MCHC RBC-ENTMCNC: 32.2 PG — SIGNIFICANT CHANGE UP (ref 27–34)
MCV RBC AUTO: 101.8 FL — HIGH (ref 80–100)
NRBC # BLD: 0 /100 WBCS — SIGNIFICANT CHANGE UP (ref 0–0)
PHOSPHATE SERPL-MCNC: 3.5 MG/DL — SIGNIFICANT CHANGE UP (ref 2.5–4.5)
PLATELET # BLD AUTO: 288 K/UL — SIGNIFICANT CHANGE UP (ref 150–400)
POTASSIUM SERPL-MCNC: 3.8 MMOL/L — SIGNIFICANT CHANGE UP (ref 3.5–5.3)
POTASSIUM SERPL-SCNC: 3.8 MMOL/L — SIGNIFICANT CHANGE UP (ref 3.5–5.3)
RBC # BLD: 4.94 M/UL — SIGNIFICANT CHANGE UP (ref 3.8–5.2)
RBC # FLD: 13.2 % — SIGNIFICANT CHANGE UP (ref 10.3–14.5)
SARS-COV-2 RNA SPEC QL NAA+PROBE: SIGNIFICANT CHANGE UP
SODIUM SERPL-SCNC: 139 MMOL/L — SIGNIFICANT CHANGE UP (ref 135–145)
WBC # BLD: 5.65 K/UL — SIGNIFICANT CHANGE UP (ref 3.8–10.5)
WBC # FLD AUTO: 5.65 K/UL — SIGNIFICANT CHANGE UP (ref 3.8–10.5)

## 2021-12-07 PROCEDURE — 99233 SBSQ HOSP IP/OBS HIGH 50: CPT | Mod: GC

## 2021-12-07 RX ORDER — POTASSIUM CHLORIDE 20 MEQ
20 PACKET (EA) ORAL ONCE
Refills: 0 | Status: COMPLETED | OUTPATIENT
Start: 2021-12-07 | End: 2021-12-07

## 2021-12-07 RX ORDER — MAGNESIUM SULFATE 500 MG/ML
2 VIAL (ML) INJECTION ONCE
Refills: 0 | Status: COMPLETED | OUTPATIENT
Start: 2021-12-07 | End: 2021-12-07

## 2021-12-07 RX ORDER — AMLODIPINE BESYLATE 2.5 MG/1
5 TABLET ORAL DAILY
Refills: 0 | Status: DISCONTINUED | OUTPATIENT
Start: 2021-12-07 | End: 2021-12-09

## 2021-12-07 RX ADMIN — CELECOXIB 100 MILLIGRAM(S): 200 CAPSULE ORAL at 19:08

## 2021-12-07 RX ADMIN — Medication 20 MILLIEQUIVALENT(S): at 10:10

## 2021-12-07 RX ADMIN — QUETIAPINE FUMARATE 200 MILLIGRAM(S): 200 TABLET, FILM COATED ORAL at 21:50

## 2021-12-07 RX ADMIN — LORATADINE 10 MILLIGRAM(S): 10 TABLET ORAL at 13:12

## 2021-12-07 RX ADMIN — AMLODIPINE BESYLATE 5 MILLIGRAM(S): 2.5 TABLET ORAL at 13:10

## 2021-12-07 RX ADMIN — Medication 1 MILLIGRAM(S): at 13:10

## 2021-12-07 RX ADMIN — Medication 1 TABLET(S): at 19:08

## 2021-12-07 RX ADMIN — Medication 400 UNIT(S): at 21:50

## 2021-12-07 RX ADMIN — Medication 400 UNIT(S): at 10:11

## 2021-12-07 RX ADMIN — Medication 50 GRAM(S): at 10:10

## 2021-12-07 RX ADMIN — Medication 325 MILLIGRAM(S): at 13:12

## 2021-12-07 RX ADMIN — TRAMADOL HYDROCHLORIDE 50 MILLIGRAM(S): 50 TABLET ORAL at 13:34

## 2021-12-07 RX ADMIN — MIRTAZAPINE 45 MILLIGRAM(S): 45 TABLET, ORALLY DISINTEGRATING ORAL at 21:50

## 2021-12-07 RX ADMIN — TRAMADOL HYDROCHLORIDE 50 MILLIGRAM(S): 50 TABLET ORAL at 12:22

## 2021-12-07 RX ADMIN — PANTOPRAZOLE SODIUM 40 MILLIGRAM(S): 20 TABLET, DELAYED RELEASE ORAL at 06:10

## 2021-12-07 RX ADMIN — Medication 1 TABLET(S): at 13:11

## 2021-12-07 RX ADMIN — SODIUM CHLORIDE 75 MILLILITER(S): 9 INJECTION, SOLUTION INTRAVENOUS at 06:10

## 2021-12-07 RX ADMIN — Medication 50 MILLIGRAM(S): at 06:11

## 2021-12-07 RX ADMIN — Medication 1 TABLET(S): at 06:10

## 2021-12-07 RX ADMIN — CELECOXIB 100 MILLIGRAM(S): 200 CAPSULE ORAL at 06:10

## 2021-12-07 RX ADMIN — Medication 500 MILLIGRAM(S): at 13:10

## 2021-12-07 RX ADMIN — Medication 1 APPLICATION(S): at 06:11

## 2021-12-07 RX ADMIN — ATORVASTATIN CALCIUM 10 MILLIGRAM(S): 80 TABLET, FILM COATED ORAL at 21:50

## 2021-12-07 RX ADMIN — PREGABALIN 1000 MICROGRAM(S): 225 CAPSULE ORAL at 13:11

## 2021-12-07 NOTE — PROGRESS NOTE ADULT - ASSESSMENT
70 yo F with PMH of anxiety, MDD, HTN, high cholesterol, COPD, b/l ankle/knee fracture (4 years ago at [facility]), scoliosis, presenting for fall 2 days ago and leg weakness, found to have hypokalemia (now resolved), rhabdomyolysis (resolving), and occasional sinus tachycardia.

## 2021-12-07 NOTE — PROGRESS NOTE ADULT - SUBJECTIVE AND OBJECTIVE BOX
Physical Medicine and Rehabilitation Progress Note:    Patient is a 71y old  Female who presents with a chief complaint of fall (06 Dec 2021 08:48)      HPI:  Patient is a 70 yo F with PMH of anxiety, MDD, HTN, high cholesterol, COPD, b/l ankle/knee fracture (4 years ago at [facility]), scoliosis, presenting for fall 2 days ago and leg weakness. Patient is a poor historian. Patient has been having leg weakness over past 5 days. Typically she ambulates with a cane. She has had episodes in which it feels like her "legs give out at the knee" which have led to falls in her apartment. Her first fall was 4 days ago, then had two falls the subsequent day. Fell backwards and hit her head. Denies lightheadedness dizziness, or visual defects. She states that she has stopped taking her depression medication, for unclear reason, however is compliant with other medications. She takes tramadol for pain, does not take more than prescribed amount. She has started no new medications in past few days/weeks. Endorses worsening depression with low mood, anhedonia, changes in sleep, decreased appetite. She does not remember her last bowel movement. Endorses weight loss after "couple years." Endorses that she had appropriate cancer screening with PCP Dr. Verdugo. Denies fevers, chills, chest pain, abdominal pain, worsening back pain (has back pain at baseline), incontinence, paresthesia, numbness, tingling in extremities.     In ED:  Vitals T 98.1 /73  RR 18 95% on RA   Labs Hgb 16.3, K 2.7, AST//66   Interventions: morphine 2 mg IV, potassium repletion, ketorolac 15 mg IV  EKG: tachycardic, sinus, QT prolonged to 526     (03 Dec 2021 08:21)                            15.9   5.65  )-----------( 288      ( 07 Dec 2021 07:34 )             50.3       12-07    139  |  104  |  6<L>  ----------------------------<  105<H>  3.8   |  24  |  0.77    Ca    9.1      07 Dec 2021 07:34  Phos  3.5     12-07  Mg     1.8     12-07      Vital Signs Last 24 Hrs  T(C): 36.3 (07 Dec 2021 05:45), Max: 37.2 (06 Dec 2021 20:45)  T(F): 97.3 (07 Dec 2021 05:45), Max: 98.9 (06 Dec 2021 20:45)  HR: 104 (07 Dec 2021 05:45) (97 - 104)  BP: 135/87 (07 Dec 2021 05:45) (117/66 - 135/87)  BP(mean): --  RR: 18 (07 Dec 2021 05:45) (18 - 18)  SpO2: 95% (07 Dec 2021 05:45) (95% - 96%)    MEDICATIONS  (STANDING):  amLODIPine   Tablet 5 milliGRAM(s) Oral daily  ammonium lactate 12% Lotion 1 Application(s) Topical <User Schedule>  ascorbic acid 500 milliGRAM(s) Oral daily  atorvastatin 10 milliGRAM(s) Oral at bedtime  calcium carbonate    500 mG (Tums) Chewable 1 Tablet(s) Chew two times a day  celecoxib 100 milliGRAM(s) Oral two times a day  cholecalciferol 400 Unit(s) Oral every 12 hours  cyanocobalamin 1000 MICROGram(s) Oral daily  enoxaparin Injectable 40 milliGRAM(s) SubCutaneous every 24 hours  ferrous    sulfate 325 milliGRAM(s) Oral daily  folic acid 1 milliGRAM(s) Oral daily  loratadine 10 milliGRAM(s) Oral daily  metoprolol succinate ER 50 milliGRAM(s) Oral daily  mirtazapine 45 milliGRAM(s) Oral at bedtime  multivitamin 1 Tablet(s) Oral daily  pantoprazole    Tablet 40 milliGRAM(s) Oral before breakfast  polyethylene glycol 3350 17 Gram(s) Oral every 24 hours  QUEtiapine 200 milliGRAM(s) Oral at bedtime  senna 2 Tablet(s) Oral at bedtime    MEDICATIONS  (PRN):  acetaminophen     Tablet .. 650 milliGRAM(s) Oral every 6 hours PRN Mild Pain (1 - 3)  albuterol/ipratropium for Nebulization 3 milliLiter(s) Nebulizer every 4 hours PRN Shortness of Breath and/or Wheezing  traMADol 50 milliGRAM(s) Oral daily PRN Severe Pain (7 - 10)    Currently Undergoing Physical/ Occupational Therapy at bedside.    Functional Status Assessment:   12/6/2021      Cognitive/Neuro/Behavioral  Cognitive/Neuro/Behavioral [WDL Definition: Alert; opens eyes spontaneously; arouses to voice or touch; oriented x 4; follows commands; speech spontaneous, logical; purposeful motor response; behavior appropriate to situation]: WDL    Language Assistance  Preferred Language to Address Healthcare Preferred Language to Address Healthcare: English    Therapeutic Interventions      Therapeutic Exercise  Therapeutic Exercise Detail: Supine AROM ankle pumps 1x20, Supine AAROM knee flex/ext 1x10 (presents w/ crepitus, but denies pain in bilat knees), Supine AROM Quad sets 1x10, Supine AROM hip abduction 1x10. Attempted to perform SLR but complained of onset of low back pain; terminated exercise post 1 rep           PM&R Impression: as above    Current Disposition Plan Recommendations:    subacute rehab placement

## 2021-12-07 NOTE — PROGRESS NOTE ADULT - SUBJECTIVE AND OBJECTIVE BOX
OVERNIGHT EVENTS: No acute events overnight.     SUBJECTIVE / INTERVAL HPI: Patient seen and examined at bedside. No new complaints. Chronic back pain, improved w/ tramadol. Pt denies any fever, chills, headache, shortness of breath, nausea, vomiting, diarrhea.     VITAL SIGNS:  Vital Signs Last 24 Hrs  T(C): 36.3 (07 Dec 2021 05:45), Max: 37.2 (06 Dec 2021 20:45)  T(F): 97.3 (07 Dec 2021 05:45), Max: 98.9 (06 Dec 2021 20:45)  HR: 104 (07 Dec 2021 05:45) (97 - 104)  BP: 135/87 (07 Dec 2021 05:45) (117/66 - 135/87)  BP(mean): --  RR: 18 (07 Dec 2021 05:45) (18 - 18)  SpO2: 95% (07 Dec 2021 05:45) (95% - 96%)    PHYSICAL EXAM:  GENERAL: NAD, lying in bed, normal mood, psychomotor retardation  HEAD:  Atraumatic, Normocephalic  EYES: EOMI, PERRLA, conjunctiva and sclera clear  ENT: dry mucous membranes  CHEST/LUNG: Clear to auscultation bilaterally; No rales, rhonchi, wheezing, or rubs.   HEART: Regular rate and rhythm; No murmurs, rubs, or gallops  ABDOMEN: Bowel sounds present; soft, large abdomen, fullness in LLQ, non-tender.   EXTREMITIES:  2+ radial pulses, +1 DP pulses.  NERVOUS SYSTEM:  Alert & Oriented X3, speech difficult to understand, low volume w/ fast speech. No focal deficits.  MSK: 4/5 strength in b/l hips, 5/5 b/l knee and ankle strength. sensation intact in LE b/l.    MEDICATIONS:  MEDICATIONS  (STANDING):  amLODIPine   Tablet 5 milliGRAM(s) Oral daily  ammonium lactate 12% Lotion 1 Application(s) Topical <User Schedule>  ascorbic acid 500 milliGRAM(s) Oral daily  atorvastatin 10 milliGRAM(s) Oral at bedtime  calcium carbonate    500 mG (Tums) Chewable 1 Tablet(s) Chew two times a day  celecoxib 100 milliGRAM(s) Oral two times a day  cholecalciferol 400 Unit(s) Oral every 12 hours  cyanocobalamin 1000 MICROGram(s) Oral daily  enoxaparin Injectable 40 milliGRAM(s) SubCutaneous every 24 hours  ferrous    sulfate 325 milliGRAM(s) Oral daily  folic acid 1 milliGRAM(s) Oral daily  loratadine 10 milliGRAM(s) Oral daily  metoprolol succinate ER 50 milliGRAM(s) Oral daily  mirtazapine 45 milliGRAM(s) Oral at bedtime  multivitamin 1 Tablet(s) Oral daily  pantoprazole    Tablet 40 milliGRAM(s) Oral before breakfast  polyethylene glycol 3350 17 Gram(s) Oral every 24 hours  QUEtiapine 200 milliGRAM(s) Oral at bedtime  senna 2 Tablet(s) Oral at bedtime    MEDICATIONS  (PRN):  acetaminophen     Tablet .. 650 milliGRAM(s) Oral every 6 hours PRN Mild Pain (1 - 3)  albuterol/ipratropium for Nebulization 3 milliLiter(s) Nebulizer every 4 hours PRN Shortness of Breath and/or Wheezing  traMADol 50 milliGRAM(s) Oral daily PRN Severe Pain (7 - 10)      ALLERGIES:  Allergies    baclofen (Unknown)  BuSpar (Unknown)  Zyprexa (Unknown)    Intolerances        LABS:                        15.9   5.65  )-----------( 288      ( 07 Dec 2021 07:34 )             50.3     12-07    139  |  104  |  6<L>  ----------------------------<  105<H>  3.8   |  24  |  0.77    Ca    9.1      07 Dec 2021 07:34  Phos  3.5     12-07  Mg     1.8     12-07          CAPILLARY BLOOD GLUCOSE      POCT Blood Glucose.: 86 mg/dL (06 Dec 2021 09:38)      RADIOLOGY & ADDITIONAL TESTS: Reviewed.

## 2021-12-07 NOTE — PROGRESS NOTE ADULT - ASSESSMENT
per Internal Medicine    70 yo F with PMH of anxiety, MDD, HTN, high cholesterol, COPD, b/l ankle/knee fracture (4 years ago at [facility]), scoliosis, presenting for fall 2 days ago and leg weakness, found to have hypokalemia (now resolved), rhabdomyolysis (resolving), and occasional sinus tachycardia.    Problem/Plan - 1   ·  Problem: Fall.   ·  Plan: Patient presenting for fall with head trauma. Patient has had multiple falls, one fall 4 days ago and two falls the subsequent day. CT head, cervical, lumbar negative for any acute processes. On exam AAO x3, no evidence of focal deficits, no sensory deficits. Patient with worsening crowding of toes, no h/o seeing podiatrist. Likely 2/2 to hypokalemia vs. polypharmacy vs. muscle weakness/problems ambulating, less likely neurogenic or cardiogenic as no acute intracranial findings or cardiac symptoms.     - Pending [facility] placement  - Podiatry recs appreciated - toenails clipped, ammonium lactate lotion daily    #COVID exposure  Exposed on 12/3. Placed on droplet precautions. No symptoms.    Problem/Plan - 2   ·  Problem: Paroxysmal sinus tachycardia.   ·  Plan: w/ PACs. Despite correction of hypokalemia, pt w/ persistent occasional sinus tachycardia to >140s. Asymptomatic. Most likely dx is 2/2 anxiety, but pt does not overtly appear anxious. TSH WNL, low suspicion for PE or sepsis at this time. Lungs clear, no hypoxia. BP stable.  - EKG noted to show PACs during episodes of tachycardia, PACs absent during baseline HR of 90  - Cardiology signed off  - If persistently tachycardic, consider CT-PE.    Problem/Plan - 3   ·  Problem: Rhabdomyolysis.   ·  Plan: CK > 14k on admission. Likely 2/2 trauma from multiple recent falls. No evidence of FALGUNI. Downtrending.  - S/p LR 150cc/hr and 75cc/hr  - No longer needed to be trended, last CPK 2000    #Urinary retention (RESOLVED)  While on fluids for rhabdo, bladder scan >600, failure to straight cath x2, required urology to place lerma.  - Passed TOV.    Problem/Plan - 4   ·  Problem: Hypokalemia.   ·  Plan: RESOLVED. Patient hypokalemic to 2.8 on admission, has muscle weakness and felling of "legs giving out," i/s/o decreased PO intake. Muscle weakness typically occurs around K of 2.5, but can happen at higher values if acute. Evidence of myoglobinuria. No EKG changes, however does have prolonged QT (resolved).   - replete K<4.    Problem/Plan - 5   ·  Problem: Weakness.   ·  Plan: Patient presenting for progressive weakness and acute weakness that precedes falls. Patient endorses poorer PO intake, weight loss, constipation. Appears dehydrated on labs and PE. Hypokalemia to 2.7. Has history of depression and endorses worsening mood affect. Mild psychomotor retardation. Weakness likely 2/2 to poor PO intake i/s/o worsening depression. UA with blood, no RBCs, indicating muscle breakdown.  - encourage PO fluid intake.    Problem/Plan - 6   ·  Problem: Polypharmacy.   ·  Plan: Patient with appropriate medications per [facility] list (quetiapine, trazodone, mirtazapine, loratadine), however currently with tramadol and on hypertensive medications although she was normotensive and had poor PO intake prior to admission.   - Resumed Tramadol and encouraged PO intake  - c/w tylenol 650 mg PRN for pain.    Problem/Plan - 7   ·  Problem: Major depression.   ·  Plan: Patient with history of MDD, on Seroquel 200 mg and mirtazapine at bedtime. Has not been taking medications, unclear reasoning, seems as though needed prescription to be filled. Endorses depressed mood, anhedonia, decreased PO intake, increased lethargy, psychomotor retardation.  - f/u outpatient PCP.    Problem/Plan - 8   ·  Problem: Constipation.   ·  Plan: Patient does not remember last BM, has poor PO intake. Abdominal exam with no tenderness but fullness. Evidence of stool burden on CT.     - c/w bowel regimen of senna, miralax.    Problem/Plan - 9   ·  Problem: Hypertension.   ·  Plan: Patient with PMH of hypertension on amlodipine and metoprolol. Normotensive on admission. Unclear why patient is on metoprolol (does not state prior cardiac history).    - holding amlodipine i/s/o normotension  - C/w Toprol 50 qd.    Problem/Plan - 10   ·  Problem: Emphysema lung.   ·  Plan; Patient with history of COPD/emphysema. Has albuterol inhaler at home. No evidence of worsening respiratory status.     - duonebs PRN  - continue to monitor.    Problem/Plan - 11   ·  Problem: Nutrition, metabolism, and development symptoms.   ·  Plan: F: LR 75 cc/hr  E: replete K < 4, Mg <2   N: DASH  DVT: lovenox

## 2021-12-07 NOTE — PROGRESS NOTE ADULT - PROBLEM SELECTOR PLAN 3
CK > 14k on admission. Likely 2/2 trauma from multiple recent falls. No evidence of FALGUNI. Downtrending.  - S/p LR 150cc/hr and 75cc/hr  - No longer needed to be trended, last CPK 2000    #Urinary retention (RESOLVED)  While on fluids for rhabdo, bladder scan >600, failure to straight cath x2, required urology to place lerma.  - Passed TOV

## 2021-12-08 LAB
ANION GAP SERPL CALC-SCNC: 10 MMOL/L — SIGNIFICANT CHANGE UP (ref 5–17)
BUN SERPL-MCNC: 7 MG/DL — SIGNIFICANT CHANGE UP (ref 7–23)
CALCIUM SERPL-MCNC: 9.3 MG/DL — SIGNIFICANT CHANGE UP (ref 8.4–10.5)
CHLORIDE SERPL-SCNC: 103 MMOL/L — SIGNIFICANT CHANGE UP (ref 96–108)
CO2 SERPL-SCNC: 26 MMOL/L — SIGNIFICANT CHANGE UP (ref 22–31)
CREAT SERPL-MCNC: 0.76 MG/DL — SIGNIFICANT CHANGE UP (ref 0.5–1.3)
GLUCOSE SERPL-MCNC: 90 MG/DL — SIGNIFICANT CHANGE UP (ref 70–99)
HCT VFR BLD CALC: 47.1 % — HIGH (ref 34.5–45)
HGB BLD-MCNC: 15.2 G/DL — SIGNIFICANT CHANGE UP (ref 11.5–15.5)
MAGNESIUM SERPL-MCNC: 1.8 MG/DL — SIGNIFICANT CHANGE UP (ref 1.6–2.6)
MCHC RBC-ENTMCNC: 32.2 PG — SIGNIFICANT CHANGE UP (ref 27–34)
MCHC RBC-ENTMCNC: 32.3 GM/DL — SIGNIFICANT CHANGE UP (ref 32–36)
MCV RBC AUTO: 99.8 FL — SIGNIFICANT CHANGE UP (ref 80–100)
NRBC # BLD: 0 /100 WBCS — SIGNIFICANT CHANGE UP (ref 0–0)
PHOSPHATE SERPL-MCNC: 3.6 MG/DL — SIGNIFICANT CHANGE UP (ref 2.5–4.5)
PLATELET # BLD AUTO: 319 K/UL — SIGNIFICANT CHANGE UP (ref 150–400)
POTASSIUM SERPL-MCNC: 3.8 MMOL/L — SIGNIFICANT CHANGE UP (ref 3.5–5.3)
POTASSIUM SERPL-SCNC: 3.8 MMOL/L — SIGNIFICANT CHANGE UP (ref 3.5–5.3)
RBC # BLD: 4.72 M/UL — SIGNIFICANT CHANGE UP (ref 3.8–5.2)
RBC # FLD: 13 % — SIGNIFICANT CHANGE UP (ref 10.3–14.5)
SODIUM SERPL-SCNC: 139 MMOL/L — SIGNIFICANT CHANGE UP (ref 135–145)
WBC # BLD: 6.35 K/UL — SIGNIFICANT CHANGE UP (ref 3.8–10.5)
WBC # FLD AUTO: 6.35 K/UL — SIGNIFICANT CHANGE UP (ref 3.8–10.5)

## 2021-12-08 PROCEDURE — 99232 SBSQ HOSP IP/OBS MODERATE 35: CPT | Mod: GC

## 2021-12-08 RX ORDER — POLYETHYLENE GLYCOL 3350 17 G/17G
17 POWDER, FOR SOLUTION ORAL
Qty: 0 | Refills: 0 | DISCHARGE
Start: 2021-12-08

## 2021-12-08 RX ORDER — POTASSIUM CHLORIDE 20 MEQ
20 PACKET (EA) ORAL ONCE
Refills: 0 | Status: COMPLETED | OUTPATIENT
Start: 2021-12-08 | End: 2021-12-08

## 2021-12-08 RX ORDER — LIDOCAINE 4 G/100G
1 CREAM TOPICAL
Qty: 0 | Refills: 0 | DISCHARGE

## 2021-12-08 RX ORDER — ALBUTEROL 90 UG/1
2 AEROSOL, METERED ORAL EVERY 6 HOURS
Refills: 0 | Status: DISCONTINUED | OUTPATIENT
Start: 2021-12-08 | End: 2021-12-09

## 2021-12-08 RX ORDER — SENNA PLUS 8.6 MG/1
2 TABLET ORAL
Qty: 0 | Refills: 0 | DISCHARGE
Start: 2021-12-08

## 2021-12-08 RX ORDER — SOD,AMMONIUM,POTASSIUM LACTATE
1 CREAM (GRAM) TOPICAL
Qty: 0 | Refills: 0 | DISCHARGE
Start: 2021-12-08

## 2021-12-08 RX ORDER — MAGNESIUM SULFATE 500 MG/ML
2 VIAL (ML) INJECTION ONCE
Refills: 0 | Status: COMPLETED | OUTPATIENT
Start: 2021-12-08 | End: 2021-12-08

## 2021-12-08 RX ORDER — BUDESONIDE AND FORMOTEROL FUMARATE DIHYDRATE 160; 4.5 UG/1; UG/1
2 AEROSOL RESPIRATORY (INHALATION)
Refills: 0 | Status: DISCONTINUED | OUTPATIENT
Start: 2021-12-08 | End: 2021-12-09

## 2021-12-08 RX ORDER — FLUTICASONE PROPIONATE AND SALMETEROL 50; 250 UG/1; UG/1
1 POWDER ORAL; RESPIRATORY (INHALATION)
Qty: 0 | Refills: 0 | DISCHARGE

## 2021-12-08 RX ADMIN — Medication 50 MILLIGRAM(S): at 06:29

## 2021-12-08 RX ADMIN — ATORVASTATIN CALCIUM 10 MILLIGRAM(S): 80 TABLET, FILM COATED ORAL at 21:59

## 2021-12-08 RX ADMIN — Medication 325 MILLIGRAM(S): at 12:34

## 2021-12-08 RX ADMIN — Medication 1 APPLICATION(S): at 06:29

## 2021-12-08 RX ADMIN — Medication 1 MILLIGRAM(S): at 12:34

## 2021-12-08 RX ADMIN — SENNA PLUS 2 TABLET(S): 8.6 TABLET ORAL at 21:54

## 2021-12-08 RX ADMIN — CELECOXIB 100 MILLIGRAM(S): 200 CAPSULE ORAL at 07:16

## 2021-12-08 RX ADMIN — Medication 500 MILLIGRAM(S): at 12:34

## 2021-12-08 RX ADMIN — Medication 20 MILLIEQUIVALENT(S): at 09:10

## 2021-12-08 RX ADMIN — Medication 400 UNIT(S): at 21:56

## 2021-12-08 RX ADMIN — CELECOXIB 100 MILLIGRAM(S): 200 CAPSULE ORAL at 18:05

## 2021-12-08 RX ADMIN — QUETIAPINE FUMARATE 200 MILLIGRAM(S): 200 TABLET, FILM COATED ORAL at 21:58

## 2021-12-08 RX ADMIN — Medication 400 UNIT(S): at 09:10

## 2021-12-08 RX ADMIN — Medication 1 TABLET(S): at 06:29

## 2021-12-08 RX ADMIN — AMLODIPINE BESYLATE 5 MILLIGRAM(S): 2.5 TABLET ORAL at 06:29

## 2021-12-08 RX ADMIN — Medication 1 TABLET(S): at 12:34

## 2021-12-08 RX ADMIN — CELECOXIB 100 MILLIGRAM(S): 200 CAPSULE ORAL at 06:30

## 2021-12-08 RX ADMIN — Medication 25 GRAM(S): at 09:10

## 2021-12-08 RX ADMIN — PREGABALIN 1000 MICROGRAM(S): 225 CAPSULE ORAL at 14:40

## 2021-12-08 RX ADMIN — PANTOPRAZOLE SODIUM 40 MILLIGRAM(S): 20 TABLET, DELAYED RELEASE ORAL at 06:30

## 2021-12-08 RX ADMIN — CELECOXIB 100 MILLIGRAM(S): 200 CAPSULE ORAL at 19:00

## 2021-12-08 RX ADMIN — LORATADINE 10 MILLIGRAM(S): 10 TABLET ORAL at 16:04

## 2021-12-08 RX ADMIN — MIRTAZAPINE 45 MILLIGRAM(S): 45 TABLET, ORALLY DISINTEGRATING ORAL at 21:58

## 2021-12-08 NOTE — PROGRESS NOTE ADULT - PROBLEM SELECTOR PLAN 10
Patient with history of COPD/emphysema. Has albuterol inhaler at home. No evidence of worsening respiratory status.     - duonebs PRN  - continue to monitor
Patient with history of COPD/emphysema. Has albuterol inhaler at home. No evidence of worsening respiratory status.     - duonebs PRN  - continue to monitor
Patient with history of COPD/emphysema. Has albuterol inhaler at home. No evidence of worsening respiratory status.   - c/w budesonide/formoterol 160mcg 2puffs bid (interchange for home Advair 250/50 bid)  - duonebs PRN  - continue to monitor
Patient with history of COPD/emphysema. Has albuterol inhaler at home. No evidence of worsening respiratory status.     - duonebs PRN  - continue to monitor
alb prn  pulm f/u as outpatient

## 2021-12-08 NOTE — PROGRESS NOTE ADULT - PROBLEM SELECTOR PLAN 9
Patient with PMH of hypertension on amlodipine and metoprolol. Normotensive on admission. Unclear why patient is on metoprolol (does not state prior cardiac history).    - holding amlodipine i/s/o normotension  - C/w Toprol 50 qd
hoold amlodipine and metoprolol and consider restarting if BP is better
Patient with PMH of hypertension on amlodipine and metoprolol. Normotensive on admission. Unclear why patient is on metoprolol (does not state prior cardiac history).    - holding amlodipine i/s/o normotension  - C/w Toprol 50 qd
Patient with PMH of hypertension on amlodipine and metoprolol. Normotensive on admission. Unclear why patient is on metoprolol (does not state prior cardiac history).    - holding metoprolol and amlodipine i/s/o falls and weakness  - f/u outpatient provider as to why on metoprolol
Patient with PMH of hypertension on amlodipine and metoprolol. Normotensive on admission. Unclear why patient is on metoprolol (does not state prior cardiac history).  - C/w Toprol 50 qd, amlodipine 5

## 2021-12-08 NOTE — PROGRESS NOTE ADULT - PROBLEM SELECTOR PLAN 6
? polypharmacy  ? Hypokalemia  ? presyncope due to arrythmia  ? polymyositis
Patient with appropriate medications per [facility] list (quetiapine, trazodone, mirtazapine, loratadine), however currently with tramadol and on hypertensive medications although she was normotensive and had poor PO intake prior to admission.   - Resumed Tramadol and encouraged PO intake  - c/w tylenol 650 mg PRN for pain
Patient with appropriate medications per [facility] list (quetiapine, trazodone, mirtazapine, loratadine), however currently with tramadol and on hypertensive medications although she was normotensive and had poor PO intake prior to admission.   - Resumed Tramadol and encouraged PO intake  - c/w tylenol 650 mg PRN for pain
Patient with appropriate medications per [facility] list (quetiapine, trazodone, mirtazapine, loratadine), however currently with tramadol and on hypertensive medications although she was normotensive and had poor PO intake prior to admission.   - Holding tramadol, mirtazapine and seroquel QHS to avoid over sedation  - c/w tylenol 650 mg PRN for pain
Patient with appropriate medications per [facility] list (quetiapine, trazodone, mirtazapine, loratadine), however currently with tramadol and on hypertensive medications although she was normotensive and had poor PO intake prior to admission.   - Resumed Tramadol and encouraged PO intake  - c/w tylenol 650 mg PRN for pain

## 2021-12-08 NOTE — PROGRESS NOTE ADULT - PROBLEM SELECTOR PLAN 11
F: None  E: replete K < 4, Mg <2   N: DASH  DVT: lovenox  Dispo: Pending DC to [facility]
F: LR 75 cc/hr  E: replete K < 4, Mg <2   N: DASH  DVT: lovenox  Dispo: Pending DC to [facility]
F: per PO   E: replete K < 4, Mg <2   N: DASH  DVT: lovenox  Dispo: [facility]
F: LR 75 cc/hr  E: replete K < 4, Mg <2   N: DASH  DVT: lovenox  Dispo: Pending DC to [facility]
F: per PO   E: replete K < 4, Mg <2   N: DASH  DVT: lovenox  Dispo: [facility]

## 2021-12-08 NOTE — PROGRESS NOTE ADULT - PROBLEM SELECTOR PLAN 7
Patient with history of MDD, on Seroquel 200 mg, trazodone, and mirtazapine at bedtime. Has not been taking medications, unclear reasoning, seems as though needed prescription to be filled. Endorses depressed mood, anhedonia, decreased PO intake, increased lethargy, psychomotor retardation.  - f/u outpatient PCP
Patient with history of MDD, on Seroquel 200 mg and mirtazapine at bedtime. Has not been taking medications, unclear reasoning, seems as though needed prescription to be filled. Endorses depressed mood, anhedonia, decreased PO intake, increased lethargy, psychomotor retardation.  - f/u outpatient PCP
restart seroquel QHS to avoid over sedation  meds held as pt was drowsy on admission
Patient with history of MDD, on Seroquel 200 mg and mirtazapine at bedtime. Has not been taking medications, unclear reasoning, seems as though needed prescription to be filled. Endorses depressed mood, anhedonia, decreased PO intake, increased lethargy, psychomotor retardation.  - f/u outpatient PCP
Patient with history of MDD, on Seroquel 150 mg and mirtazapine at bedtime. Has not been taking medications, unclear reasoning, seems as though needed prescription to be filled. Endorses depressed mood, anhedonia, decreased PO intake, increased lethargy, psychomotor retardation.    - Holding mirtazapine and seroquel QHS to avoid over sedation  - f/u outpatient PCP

## 2021-12-08 NOTE — PROGRESS NOTE ADULT - ASSESSMENT
70 yo F with PMH of anxiety, MDD, HTN, high cholesterol, COPD, b/l ankle/knee fracture (4 years ago at [facility]), scoliosis, presenting for fall 2 days ago and leg weakness, found to have hypokalemia (now resolved), rhabdomyolysis (resolving), and occasional sinus tachycardia.

## 2021-12-08 NOTE — PROGRESS NOTE ADULT - PROBLEM SELECTOR PLAN 5
Patient presenting for progressive weakness and acute weakness that precedes falls. Patient endorses poorer PO intake, weight loss, constipation. Appears dehydrated on labs and PE. Hypokalemia to 2.7. Has history of depression and endorses worsening mood affect. Mild psychomotor retardation. Weakness likely 2/2 to poor PO intake i/s/o worsening depression. UA with blood, no RBCs, indicating muscle breakdown.  - encourage PO fluid intake
Patient presenting for progressive weakness and acute weakness that precedes falls. Patient endorses poorer PO intake, weight loss, constipation. Appears dehydrated on labs and PE. Hypokalemia to 2.7. Has history of depression and endorses worsening mood affect. Mild psychomotor retardation. Weakness likely 2/2 to poor PO intake i/s/o worsening depression. UA with blood, no RBCs, indicating muscle breakdown.  - encourage PO fluid intake
PT advised [facility]
Patient presenting for progressive weakness and acute weakness that precedes falls. Patient endorses poorer PO intake, weight loss, constipation. Appears dehydrated on labs and PE. Hypokalemia to 2.7. Has history of depression and endorses worsening mood affect. Mild psychomotor retardation. Weakness likely 2/2 to poor PO intake i/s/o worsening depression. UA with blood, no RBCs, indicating muscle breakdown.  - encourage PO fluid intake
Patient presenting for progressive weakness and acute weakness that precedes falls. Patient endorses poorer PO intake, weight loss, constipation. Appears dehydrated on labs and PE. Hypokalemia to 2.7. Has history of depression and endorses worsening mood affect. Mild psychomotor retardation. Weakness likely 2/2 to poor PO intake i/s/o worsening depression. UA with blood, no RBCs, indicating muscle breakdown.  - PT evaluation   - Holding tramadol, mirtazapine and seroquel QHS to avoid over sedation/polypharmacy  - encourage PO fluid intake

## 2021-12-08 NOTE — PROGRESS NOTE ADULT - PROBLEM SELECTOR PROBLEM 2
Paroxysmal sinus tachycardia
Paroxysmal sinus tachycardia
Rhabdomyolysis
Paroxysmal sinus tachycardia
Paroxysmal sinus tachycardia

## 2021-12-08 NOTE — PROGRESS NOTE ADULT - PROBLEM SELECTOR PLAN 2
CK > 14k on admission. Likely 2/2 trauma from multiple recent falls. No evidence of FALGUNI.  cont ivf  consider eval for polymyositis and consider rheum consult if not downtrensing
w/ PACs. Despite correction of hypokalemia, pt w/ persistent occasional sinus tachycardia to >140s. Asymptomatic. Most likely dx is 2/2 anxiety, but pt does not overtly appear anxious. TSH WNL, low suspicion for PE or sepsis at this time. Lungs clear, no hypoxia. BP stable.  - EKG noted to show PACs during episodes of tachycardia, PACs absent during baseline HR of 90  - Cardiology signed off  - If persistently tachycardic, consider CT-PE
w/ PACs. Despite correction of hypokalemia, pt w/ persistent occasional sinus tachycardia to >140s. Asymptomatic. Most likely dx is 2/2 anxiety, but pt does not overtly appear anxious. TSH WNL, low suspicion for PE or sepsis at this time. Lungs clear, no hypoxia. BP stable.  - EKG noted to show PACs during episodes of tachycardia, PACs absent during baseline HR of 90  - Cardiology signed off  - If persistently tachycardic, consider CT-PE
w/ PACs. Despite correction of hypokalemia, pt w/ persistent occasional sinus tachycardia to >140s. Asymptomatic. Most likely dx is 2/2 anxiety, but pt does not overtly appear anxious. TSH WNL, low suspicion for PE or sepsis at this time. Lungs clear, no hypoxia. BP stable.  - EKG noted to show PACs during episodes of tachycardia, PACs absent during baseline HR of 90  - Cardiology consulted, appreciate recs  - If persistently tachycardic, consider CT-PE
w/ PACs. Despite correction of hypokalemia, pt w/ persistent occasional sinus tachycardia to >140s. Asymptomatic. Most likely dx is 2/2 anxiety, but pt does not overtly appear anxious. TSH WNL, low suspicion for PE or sepsis at this time. Lungs clear, no hypoxia. BP stable.  - EKG noted to show PACs during episodes of tachycardia, PACs absent during baseline HR of 90  - Cardiology signed off  - If persistently tachycardic, consider CT-PE

## 2021-12-08 NOTE — PROGRESS NOTE ADULT - SUBJECTIVE AND OBJECTIVE BOX
OVERNIGHT EVENTS: No acute events overnight.     SUBJECTIVE / INTERVAL HPI: Patient seen and examined at bedside. Pt is upset that her belongings were left in her previous room. Pt denies any other complaints. Pt is medically stable for discharge, but pt requests to stay. 24-hour notice given. Denies SOB, chest pain, fever, palpitations.    VITAL SIGNS:  Vital Signs Last 24 Hrs  T(C): 37.1 (08 Dec 2021 13:02), Max: 37.1 (08 Dec 2021 13:02)  T(F): 98.7 (08 Dec 2021 13:02), Max: 98.7 (08 Dec 2021 13:02)  HR: 100 (08 Dec 2021 13:02) (89 - 104)  BP: 117/76 (08 Dec 2021 13:02) (111/62 - 126/79)  BP(mean): --  RR: 18 (08 Dec 2021 13:02) (18 - 18)  SpO2: 97% (08 Dec 2021 13:02) (94% - 97%)    PHYSICAL EXAM:  GENERAL: NAD, lying in bed, normal mood, psychomotor retardation  HEAD:  Atraumatic, Normocephalic  EYES: EOMI, PERRLA, conjunctiva and sclera clear  ENT: dry mucous membranes  CHEST/LUNG: Clear to auscultation bilaterally; No rales, rhonchi, wheezing, or rubs.   HEART: Regular rate and rhythm; No murmurs, rubs, or gallops  ABDOMEN: Bowel sounds present; soft, large abdomen, fullness in LLQ, non-tender.   EXTREMITIES:  2+ radial pulses, +1 DP pulses.  NERVOUS SYSTEM:  Alert & Oriented X3, speech difficult to understand, low volume w/ fast speech. No focal deficits.  MSK: 4/5 strength in b/l hips, 5/5 b/l knee and ankle strength. sensation intact in LE b/l.    MEDICATIONS:  MEDICATIONS  (STANDING):  amLODIPine   Tablet 5 milliGRAM(s) Oral daily  ammonium lactate 12% Lotion 1 Application(s) Topical <User Schedule>  ascorbic acid 500 milliGRAM(s) Oral daily  atorvastatin 10 milliGRAM(s) Oral at bedtime  budesonide 160 MICROgram(s)/formoterol 4.5 MICROgram(s) Inhaler 2 Puff(s) Inhalation two times a day  calcium carbonate    500 mG (Tums) Chewable 1 Tablet(s) Chew two times a day  celecoxib 100 milliGRAM(s) Oral two times a day  cholecalciferol 400 Unit(s) Oral every 12 hours  cyanocobalamin 1000 MICROGram(s) Oral daily  enoxaparin Injectable 40 milliGRAM(s) SubCutaneous every 24 hours  ferrous    sulfate 325 milliGRAM(s) Oral daily  folic acid 1 milliGRAM(s) Oral daily  loratadine 10 milliGRAM(s) Oral daily  metoprolol succinate ER 50 milliGRAM(s) Oral daily  mirtazapine 45 milliGRAM(s) Oral at bedtime  multivitamin 1 Tablet(s) Oral daily  pantoprazole    Tablet 40 milliGRAM(s) Oral before breakfast  polyethylene glycol 3350 17 Gram(s) Oral every 24 hours  QUEtiapine 200 milliGRAM(s) Oral at bedtime  senna 2 Tablet(s) Oral at bedtime    MEDICATIONS  (PRN):  acetaminophen     Tablet .. 650 milliGRAM(s) Oral every 6 hours PRN Mild Pain (1 - 3)  ALBUTerol    90 MICROgram(s) HFA Inhaler 2 Puff(s) Inhalation every 6 hours PRN Shortness of Breath and/or Wheezing  traMADol 50 milliGRAM(s) Oral daily PRN Severe Pain (7 - 10)      ALLERGIES:  Allergies    baclofen (Unknown)  BuSpar (Unknown)  Zyprexa (Unknown)    Intolerances        LABS:                        15.2   6.35  )-----------( 319      ( 08 Dec 2021 07:37 )             47.1     12-08    139  |  103  |  7   ----------------------------<  90  3.8   |  26  |  0.76    Ca    9.3      08 Dec 2021 07:37  Phos  3.6     12-08  Mg     1.8     12-08          CAPILLARY BLOOD GLUCOSE          RADIOLOGY & ADDITIONAL TESTS: Reviewed.

## 2021-12-08 NOTE — PROGRESS NOTE ADULT - PROVIDER SPECIALTY LIST ADULT
Cardiology
Internal Medicine
Podiatry
Rehab Medicine
Hospitalist

## 2021-12-08 NOTE — PROGRESS NOTE ADULT - PROBLEM SELECTOR PLAN 4
RESOLVED. Patient hypokalemic to 2.8 on admission, has muscle weakness and felling of "legs giving out," i/s/o decreased PO intake. Muscle weakness typically occurs around K of 2.5, but can happen at higher values if acute. Evidence of myoglobinuria. No EKG changes, however does have prolonged QT (resolved).   - replete K<4
- Holding tramadol, mirtazapine and antihypertensives
RESOLVED. Patient hypokalemic to 2.8 on admission, has muscle weakness and felling of "legs giving out," i/s/o decreased PO intake. Muscle weakness typically occurs around K of 2.5, but can happen at higher values if acute. Evidence of myoglobinuria. No EKG changes, however does have prolonged QT (resolved).   - replete K<4

## 2021-12-08 NOTE — PROGRESS NOTE ADULT - PROBLEM SELECTOR PLAN 1
Patient presenting for fall with head trauma. Patient has had multiple falls, one fall 4 days ago and two falls the subsequent day. CT head, cervical, lumbar negative for any acute processes. On exam AAO x3, no evidence of focal deficits, no sensory deficits. Patient with worsening crowding of toes, no h/o seeing podiatrist. Likely 2/2 to hypokalemia vs. polypharmacy vs. muscle weakness/problems ambulating, less likely neurogenic or cardiogenic as no acute intracranial findings or cardiac symptoms.   - Pending [facility] placement  - Podiatry recs appreciated - toenails clipped, ammonium lactate lotion daily    #COVID exposure  Exposed on 12/3. Placed on droplet precautions. No symptoms.  - COVID PCR negative 12/7
Patient presenting for fall with head trauma. Patient has had multiple falls, one fall 4 days ago and two falls the subsequent day. CT head, cervical, lumbar negative for any acute processes. On exam AAO x3, no evidence of focal deficits, no sensory deficits. Patient with worsening crowding of toes, no h/o seeing podiatrist. Likely 2/2 to hypokalemia vs. polypharmacy vs. muscle weakness/problems ambulating, less likely neurogenic or cardiogenic as no acute intracranial findings or cardiac symptoms.     - Pending [facility] placement  - Podiatry recs appreciated - toenails clipped, ammonium lactate lotion daily
peristent tachy.   - EKG noted to show PACs during episodes of tachycardia, PACs absent during baseline HR of 90  - Cardiology consulted, appreciate recs  - If persistently tachycardic, consider CT-PE  No signs of sepsis
Patient presenting for fall with head trauma. Patient has had multiple falls, one fall 4 days ago and two falls the subsequent day. CT head, cervical, lumbar negative for any acute processes. On exam AAO x3, no evidence of focal deficits, no sensory deficits. Patient with worsening crowding of toes, no h/o seeing podiatrist. Likely 2/2 to hypokalemia vs. polypharmacy vs. muscle weakness/problems ambulating, less likely neurogenic or cardiogenic as no acute intracranial findings or cardiac symptoms.     - Pending [facility] placement  - Podiatry recs appreciated - toenails clipped, ammonium lactate lotion daily    #COVID exposure  Exposed on 12/3. Placed on droplet precautions. No symptoms.
Patient presenting for fall with head trauma. Patient has had multiple falls, one fall 4 days ago and two falls the subsequent day. CT head, cervical, lumbar negative for any acute processes. On exam AAO x3, no evidence of focal deficits, no sensory deficits. Patient with worsening crowding of toes, no h/o seeing podiatrist. Likely 2/2 to hypokalemia vs. polypharmacy vs. muscle weakness/problems ambulating, less likely neurogenic or cardiogenic as no acute intracranial findings or cardiac symptoms.     - hold BP medications, as normotensive on presentation  - PT evaluation   - f/u orthostatic vitals w/ PT eval  - Holding tramadol, mirtazapine and seroquel QHS to avoid over sedation/polypharmacy  - Podiatry recs appreciated - toenails clipped, ammonium lactate lotion daily

## 2021-12-08 NOTE — PROGRESS NOTE ADULT - PROBLEM SELECTOR PLAN 8
Patient does not remember last BM, has poor PO intake. Abdominal exam with no tenderness but fullness. Evidence of stool burden on CT.     - c/w bowel regimen of senna, miralax
Evidence of stool burden on CT.   No signs of acute abdomen    - c/w bowel regimen of senna, miralax

## 2021-12-09 ENCOUNTER — TRANSCRIPTION ENCOUNTER (OUTPATIENT)
Age: 71
End: 2021-12-09

## 2021-12-09 VITALS
RESPIRATION RATE: 16 BRPM | SYSTOLIC BLOOD PRESSURE: 105 MMHG | DIASTOLIC BLOOD PRESSURE: 66 MMHG | OXYGEN SATURATION: 95 % | HEART RATE: 98 BPM | TEMPERATURE: 99 F

## 2021-12-09 PROCEDURE — 99232 SBSQ HOSP IP/OBS MODERATE 35: CPT | Mod: GC

## 2021-12-09 RX ADMIN — Medication 500 MILLIGRAM(S): at 11:51

## 2021-12-09 RX ADMIN — Medication 1 TABLET(S): at 11:52

## 2021-12-09 RX ADMIN — CELECOXIB 100 MILLIGRAM(S): 200 CAPSULE ORAL at 08:42

## 2021-12-09 RX ADMIN — Medication 325 MILLIGRAM(S): at 11:51

## 2021-12-09 RX ADMIN — CELECOXIB 100 MILLIGRAM(S): 200 CAPSULE ORAL at 17:29

## 2021-12-09 RX ADMIN — CELECOXIB 100 MILLIGRAM(S): 200 CAPSULE ORAL at 09:15

## 2021-12-09 RX ADMIN — PREGABALIN 1000 MICROGRAM(S): 225 CAPSULE ORAL at 11:51

## 2021-12-09 RX ADMIN — PANTOPRAZOLE SODIUM 40 MILLIGRAM(S): 20 TABLET, DELAYED RELEASE ORAL at 06:13

## 2021-12-09 RX ADMIN — Medication 400 UNIT(S): at 08:42

## 2021-12-09 RX ADMIN — LORATADINE 10 MILLIGRAM(S): 10 TABLET ORAL at 11:52

## 2021-12-09 RX ADMIN — CELECOXIB 100 MILLIGRAM(S): 200 CAPSULE ORAL at 18:35

## 2021-12-09 RX ADMIN — Medication 1 TABLET(S): at 17:29

## 2021-12-09 RX ADMIN — Medication 1 MILLIGRAM(S): at 11:52

## 2021-12-09 NOTE — DISCHARGE NOTE NURSING/CASE MANAGEMENT/SOCIAL WORK - PATIENT PORTAL LINK FT
You can access the FollowMyHealth Patient Portal offered by [facility] by registering at the following website: [email or web address]. By joining [facility]’s FollowMyHealth portal, you will also be able to view your health information using other applications (apps) compatible with our system.

## 2021-12-09 NOTE — DISCHARGE NOTE NURSING/CASE MANAGEMENT/SOCIAL WORK - NSDCPEFALRISK_GEN_ALL_CORE
For information on Fall & Injury Prevention, visit: [email or web address] OR  [email or web address] OR  https://www.cdc.gov/steadi/patient.html

## 2021-12-09 NOTE — CHART NOTE - NSCHARTNOTEFT_GEN_A_CORE
Informed by RN that patient wanted to leave, apparently was frustrated by delay in transportation to [facility]. By the time writer was able to go see patient, patient eloped without notice. Patient has been AAOx4 today and yesterday and has full decision making capacity. Was explained the benefits and risks of not going to [facility] yesterday by the writer and with  yesterday. Informed by RN that patient wanted to leave, apparently was frustrated by delay in transportation to [facility]. By the time writer was able to go see patient, patient eloped without notice. Patient has been AAOx4 today and yesterday and has full decision making capacity. Was explained the benefits and risks of not going to [facility] yesterday by the writer and  yesterday.

## 2021-12-09 NOTE — DISCHARGE NOTE NURSING/CASE MANAGEMENT/SOCIAL WORK - NSDCFUADDAPPT_GEN_ALL_CORE_FT
Please follow up with your primary care physician. We have scheduled a primary care appointment with Dr. Andre on 1/05/22 at 11:45AM. This was the earliest date available. However, if an earlier appointment date opens up, Dr. Andre's office will call you at [phone] in order to schedule an earlier appointment. Please call Dr. Andre's office at [phone] to confirm your appointment.

## 2021-12-15 DIAGNOSIS — M41.9 SCOLIOSIS, UNSPECIFIED: ICD-10-CM

## 2021-12-15 DIAGNOSIS — T79.6XXA TRAUMATIC ISCHEMIA OF MUSCLE, INITIAL ENCOUNTER: ICD-10-CM

## 2021-12-15 DIAGNOSIS — M25.569 PAIN IN UNSPECIFIED KNEE: ICD-10-CM
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
## 2021-12-15 DIAGNOSIS — F32.9 MAJOR DEPRESSIVE DISORDER, SINGLE EPISODE, UNSPECIFIED: ICD-10-CM

## 2021-12-15 DIAGNOSIS — L60.2 ONYCHOGRYPHOSIS: ICD-10-CM

## 2021-12-15 DIAGNOSIS — Z79.899 OTHER LONG TERM (CURRENT) DRUG THERAPY: ICD-10-CM

## 2021-12-15 DIAGNOSIS — R33.9 RETENTION OF URINE, UNSPECIFIED: ICD-10-CM
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
## 2021-12-15 DIAGNOSIS — F41.9 ANXIETY DISORDER, UNSPECIFIED: ICD-10-CM

## 2021-12-15 DIAGNOSIS — I49.1 ATRIAL PREMATURE DEPOLARIZATION: ICD-10-CM
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
## 2021-12-15 DIAGNOSIS — R82.1 MYOGLOBINURIA: ICD-10-CM
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
## 2021-12-15 DIAGNOSIS — L85.3 XEROSIS CUTIS: ICD-10-CM

## 2021-12-15 DIAGNOSIS — Z20.822 CONTACT WITH AND (SUSPECTED) EXPOSURE TO COVID-19: ICD-10-CM

## 2021-12-15 DIAGNOSIS — J43.9 EMPHYSEMA, UNSPECIFIED: ICD-10-CM

## 2021-12-15 DIAGNOSIS — E86.0 DEHYDRATION: ICD-10-CM

## 2021-12-15 DIAGNOSIS — K59.00 CONSTIPATION, UNSPECIFIED: ICD-10-CM

## 2021-12-15 DIAGNOSIS — G89.29 OTHER CHRONIC PAIN: ICD-10-CM

## 2021-12-15 DIAGNOSIS — I10 ESSENTIAL (PRIMARY) HYPERTENSION: ICD-10-CM

## 2021-12-15 DIAGNOSIS — E87.6 HYPOKALEMIA: ICD-10-CM

## 2021-12-15 DIAGNOSIS — I47.1 SUPRAVENTRICULAR TACHYCARDIA: ICD-10-CM

## 2021-12-15 DIAGNOSIS — R53.1 WEAKNESS: ICD-10-CM

## 2021-12-22 PROCEDURE — 85027 COMPLETE CBC AUTOMATED: CPT

## 2021-12-22 PROCEDURE — 94640 AIRWAY INHALATION TREATMENT: CPT

## 2021-12-22 PROCEDURE — 87635 SARS-COV-2 COVID-19 AMP PRB: CPT

## 2021-12-22 PROCEDURE — 87086 URINE CULTURE/COLONY COUNT: CPT

## 2021-12-22 PROCEDURE — 96365 THER/PROPH/DIAG IV INF INIT: CPT

## 2021-12-22 PROCEDURE — 80053 COMPREHEN METABOLIC PANEL: CPT

## 2021-12-22 PROCEDURE — 93005 ELECTROCARDIOGRAM TRACING: CPT

## 2021-12-22 PROCEDURE — 84484 ASSAY OF TROPONIN QUANT: CPT

## 2021-12-22 PROCEDURE — 70450 CT HEAD/BRAIN W/O DYE: CPT | Mod: MA

## 2021-12-22 PROCEDURE — 80048 BASIC METABOLIC PNL TOTAL CA: CPT

## 2021-12-22 PROCEDURE — 85025 COMPLETE CBC W/AUTO DIFF WBC: CPT

## 2021-12-22 PROCEDURE — 85730 THROMBOPLASTIN TIME PARTIAL: CPT

## 2021-12-22 PROCEDURE — 97161 PT EVAL LOW COMPLEX 20 MIN: CPT

## 2021-12-22 PROCEDURE — 82746 ASSAY OF FOLIC ACID SERUM: CPT

## 2021-12-22 PROCEDURE — U0003: CPT

## 2021-12-22 PROCEDURE — 72131 CT LUMBAR SPINE W/O DYE: CPT | Mod: MA

## 2021-12-22 PROCEDURE — 82550 ASSAY OF CK (CPK): CPT

## 2021-12-22 PROCEDURE — 96375 TX/PRO/DX INJ NEW DRUG ADDON: CPT

## 2021-12-22 PROCEDURE — 85610 PROTHROMBIN TIME: CPT

## 2021-12-22 PROCEDURE — 86803 HEPATITIS C AB TEST: CPT

## 2021-12-22 PROCEDURE — 82607 VITAMIN B-12: CPT

## 2021-12-22 PROCEDURE — 36415 COLL VENOUS BLD VENIPUNCTURE: CPT

## 2021-12-22 PROCEDURE — 83880 ASSAY OF NATRIURETIC PEPTIDE: CPT

## 2021-12-22 PROCEDURE — 73620 X-RAY EXAM OF FOOT: CPT

## 2021-12-22 PROCEDURE — 81001 URINALYSIS AUTO W/SCOPE: CPT

## 2021-12-22 PROCEDURE — 80307 DRUG TEST PRSMV CHEM ANLYZR: CPT

## 2021-12-22 PROCEDURE — C8929: CPT

## 2021-12-22 PROCEDURE — 99285 EMERGENCY DEPT VISIT HI MDM: CPT | Mod: 25

## 2021-12-22 PROCEDURE — 84443 ASSAY THYROID STIM HORMONE: CPT

## 2021-12-22 PROCEDURE — 83735 ASSAY OF MAGNESIUM: CPT

## 2021-12-22 PROCEDURE — 84100 ASSAY OF PHOSPHORUS: CPT

## 2021-12-22 PROCEDURE — U0005: CPT

## 2021-12-22 PROCEDURE — 72125 CT NECK SPINE W/O DYE: CPT | Mod: MA

## 2021-12-22 PROCEDURE — 82962 GLUCOSE BLOOD TEST: CPT

## 2022-01-24 NOTE — BEHAVIORAL HEALTH ASSESSMENT NOTE - BODY HABITUS
Take Zofran as needed for nausea and vomiting  Return for worsening of symptoms including worsening pain, uncontrollable vomiting or if you notice blood in your vomit      Follow up with gastroenterology
Underweight

## 2022-04-17 NOTE — ED BEHAVIORAL HEALTH ASSESSMENT NOTE - EMPLOYMENT
Problem: At Risk for Falls  Goal: # Patient does not fall  Outcome: Outcome Not Met, Continue to Monitor  Goal: # Takes action to control fall-related risks  Outcome: Outcome Not Met, Continue to Monitor  Goal: # Verbalizes understanding of fall risk/precautions  Description: Document education using the patient education activity  Outcome: Outcome Not Met, Continue to Monitor     Problem: Breathing Pattern Ineffective  Goal: Air exchange is effective, demonstrated by Sp02 sat of greater then or = 92% (or as ordered)  Outcome: Outcome Not Met, Continue to Monitor  Goal: Respiratory pattern is quiet and regular without report of SOB  Outcome: Outcome Not Met, Continue to Monitor  Goal: Breathing pattern demonstrates minimal apnea during sleep with appropriate use of airway pressure support devices  Outcome: Outcome Not Met, Continue to Monitor  Goal: Verbalizes/demonstrates effective breathing management strategies  Description: Document education using the patient education activity.   Outcome: Outcome Not Met, Continue to Monitor     Problem: Fluid Volume Excess, Risk for  Goal: # Absence of Rapid Weight Gain (no more than 2kg in 24 hours)  Description: FVE Risk Patients may gain weight (but not more than 2 kg) but may not require aggressive treatment if in the absence of dyspnea; FVE (actual) patients should be monitored to achieve no weight gain.   Outcome: Outcome Not Met, Continue to Monitor  Goal: # Absence of New Onset Dyspnea  Description: Dyspnea greater than SOB with Activity may be indicator of fluid volume excess  Outcome: Outcome Not Met, Continue to Monitor  Goal: # Verbalizes understanding of FVE prevention plan  Description: Document on Patient Education Activity  Outcome: Outcome Not Met, Continue to Monitor     Problem: VTE (Actual)  Goal: # Verbalizes understanding of VTE and treatment plan  Description: Document education using the patient education activity.   Outcome: Outcome Not Met,  Continue to Monitor      Retired

## 2022-06-09 ENCOUNTER — EMERGENCY (EMERGENCY)
Facility: HOSPITAL | Age: 72
LOS: 1 days | Discharge: HOME | End: 2022-06-09
Attending: EMERGENCY MEDICINE | Admitting: EMERGENCY MEDICINE
Payer: MEDICARE

## 2022-06-09 VITALS
HEIGHT: 65 IN | DIASTOLIC BLOOD PRESSURE: 86 MMHG | WEIGHT: 100.97 LBS | OXYGEN SATURATION: 94 % | RESPIRATION RATE: 18 BRPM | HEART RATE: 98 BPM | SYSTOLIC BLOOD PRESSURE: 146 MMHG | TEMPERATURE: 98 F

## 2022-06-09 VITALS
TEMPERATURE: 98 F | OXYGEN SATURATION: 96 % | HEART RATE: 88 BPM | RESPIRATION RATE: 18 BRPM | SYSTOLIC BLOOD PRESSURE: 121 MMHG | DIASTOLIC BLOOD PRESSURE: 80 MMHG

## 2022-06-09 DIAGNOSIS — F32.9 MAJOR DEPRESSIVE DISORDER, SINGLE EPISODE, UNSPECIFIED: ICD-10-CM

## 2022-06-09 DIAGNOSIS — R63.0 ANOREXIA: ICD-10-CM

## 2022-06-09 DIAGNOSIS — Z87.81 PERSONAL HISTORY OF (HEALED) TRAUMATIC FRACTURE: ICD-10-CM

## 2022-06-09 DIAGNOSIS — J44.9 CHRONIC OBSTRUCTIVE PULMONARY DISEASE, UNSPECIFIED: ICD-10-CM

## 2022-06-09 DIAGNOSIS — R06.02 SHORTNESS OF BREATH: ICD-10-CM

## 2022-06-09 DIAGNOSIS — Z88.8 ALLERGY STATUS TO OTHER DRUGS, MEDICAMENTS AND BIOLOGICAL SUBSTANCES: ICD-10-CM

## 2022-06-09 DIAGNOSIS — Z20.822 CONTACT WITH AND (SUSPECTED) EXPOSURE TO COVID-19: ICD-10-CM

## 2022-06-09 DIAGNOSIS — E78.00 PURE HYPERCHOLESTEROLEMIA, UNSPECIFIED: ICD-10-CM

## 2022-06-09 DIAGNOSIS — M41.9 SCOLIOSIS, UNSPECIFIED: ICD-10-CM

## 2022-06-09 DIAGNOSIS — I10 ESSENTIAL (PRIMARY) HYPERTENSION: ICD-10-CM

## 2022-06-09 DIAGNOSIS — Z98.89 OTHER SPECIFIED POSTPROCEDURAL STATES: Chronic | ICD-10-CM

## 2022-06-09 LAB
ALBUMIN SERPL ELPH-MCNC: 4.5 G/DL — SIGNIFICANT CHANGE UP (ref 3.3–5)
ALP SERPL-CCNC: 97 U/L — SIGNIFICANT CHANGE UP (ref 40–120)
ALT FLD-CCNC: 11 U/L — SIGNIFICANT CHANGE UP (ref 10–45)
ANION GAP SERPL CALC-SCNC: 15 MMOL/L — SIGNIFICANT CHANGE UP (ref 5–17)
APTT BLD: 28.4 SEC — SIGNIFICANT CHANGE UP (ref 27.5–35.5)
AST SERPL-CCNC: 20 U/L — SIGNIFICANT CHANGE UP (ref 10–40)
BASOPHILS # BLD AUTO: 0.05 K/UL — SIGNIFICANT CHANGE UP (ref 0–0.2)
BASOPHILS NFR BLD AUTO: 0.9 % — SIGNIFICANT CHANGE UP (ref 0–2)
BILIRUB SERPL-MCNC: 0.5 MG/DL — SIGNIFICANT CHANGE UP (ref 0.2–1.2)
BUN SERPL-MCNC: 5 MG/DL — LOW (ref 7–23)
CALCIUM SERPL-MCNC: 9.9 MG/DL — SIGNIFICANT CHANGE UP (ref 8.4–10.5)
CHLORIDE SERPL-SCNC: 104 MMOL/L — SIGNIFICANT CHANGE UP (ref 96–108)
CO2 SERPL-SCNC: 22 MMOL/L — SIGNIFICANT CHANGE UP (ref 22–31)
CREAT SERPL-MCNC: 0.66 MG/DL — SIGNIFICANT CHANGE UP (ref 0.5–1.3)
EGFR: 93 ML/MIN/1.73M2 — SIGNIFICANT CHANGE UP
EOSINOPHIL # BLD AUTO: 0.23 K/UL — SIGNIFICANT CHANGE UP (ref 0–0.5)
EOSINOPHIL NFR BLD AUTO: 4.3 % — SIGNIFICANT CHANGE UP (ref 0–6)
GLUCOSE SERPL-MCNC: 92 MG/DL — SIGNIFICANT CHANGE UP (ref 70–99)
HCT VFR BLD CALC: 43.4 % — SIGNIFICANT CHANGE UP (ref 34.5–45)
HGB BLD-MCNC: 14.7 G/DL — SIGNIFICANT CHANGE UP (ref 11.5–15.5)
IMM GRANULOCYTES NFR BLD AUTO: 0.8 % — SIGNIFICANT CHANGE UP (ref 0–1.5)
INR BLD: 1.11 — SIGNIFICANT CHANGE UP (ref 0.88–1.16)
LYMPHOCYTES # BLD AUTO: 1.82 K/UL — SIGNIFICANT CHANGE UP (ref 1–3.3)
LYMPHOCYTES # BLD AUTO: 34.3 % — SIGNIFICANT CHANGE UP (ref 13–44)
MCHC RBC-ENTMCNC: 33.9 GM/DL — SIGNIFICANT CHANGE UP (ref 32–36)
MCHC RBC-ENTMCNC: 34.1 PG — HIGH (ref 27–34)
MCV RBC AUTO: 100.7 FL — HIGH (ref 80–100)
MONOCYTES # BLD AUTO: 0.67 K/UL — SIGNIFICANT CHANGE UP (ref 0–0.9)
MONOCYTES NFR BLD AUTO: 12.6 % — SIGNIFICANT CHANGE UP (ref 2–14)
NEUTROPHILS # BLD AUTO: 2.49 K/UL — SIGNIFICANT CHANGE UP (ref 1.8–7.4)
NEUTROPHILS NFR BLD AUTO: 47.1 % — SIGNIFICANT CHANGE UP (ref 43–77)
NRBC # BLD: 0 /100 WBCS — SIGNIFICANT CHANGE UP (ref 0–0)
PLATELET # BLD AUTO: 481 K/UL — HIGH (ref 150–400)
POTASSIUM SERPL-MCNC: 3 MMOL/L — LOW (ref 3.5–5.3)
POTASSIUM SERPL-SCNC: 3 MMOL/L — LOW (ref 3.5–5.3)
PROT SERPL-MCNC: 7.7 G/DL — SIGNIFICANT CHANGE UP (ref 6–8.3)
PROTHROM AB SERPL-ACNC: 13.2 SEC — SIGNIFICANT CHANGE UP (ref 10.5–13.4)
RBC # BLD: 4.31 M/UL — SIGNIFICANT CHANGE UP (ref 3.8–5.2)
RBC # FLD: 13.4 % — SIGNIFICANT CHANGE UP (ref 10.3–14.5)
SARS-COV-2 RNA SPEC QL NAA+PROBE: NEGATIVE — SIGNIFICANT CHANGE UP
SODIUM SERPL-SCNC: 141 MMOL/L — SIGNIFICANT CHANGE UP (ref 135–145)
TROPONIN T SERPL-MCNC: 0.01 NG/ML — SIGNIFICANT CHANGE UP (ref 0–0.01)
WBC # BLD: 5.3 K/UL — SIGNIFICANT CHANGE UP (ref 3.8–10.5)
WBC # FLD AUTO: 5.3 K/UL — SIGNIFICANT CHANGE UP (ref 3.8–10.5)

## 2022-06-09 PROCEDURE — 87635 SARS-COV-2 COVID-19 AMP PRB: CPT

## 2022-06-09 PROCEDURE — 99285 EMERGENCY DEPT VISIT HI MDM: CPT

## 2022-06-09 PROCEDURE — 84484 ASSAY OF TROPONIN QUANT: CPT

## 2022-06-09 PROCEDURE — 80053 COMPREHEN METABOLIC PANEL: CPT

## 2022-06-09 PROCEDURE — 71045 X-RAY EXAM CHEST 1 VIEW: CPT | Mod: 26

## 2022-06-09 PROCEDURE — 99285 EMERGENCY DEPT VISIT HI MDM: CPT | Mod: 25

## 2022-06-09 PROCEDURE — 71045 X-RAY EXAM CHEST 1 VIEW: CPT

## 2022-06-09 PROCEDURE — 83880 ASSAY OF NATRIURETIC PEPTIDE: CPT

## 2022-06-09 PROCEDURE — 93005 ELECTROCARDIOGRAM TRACING: CPT

## 2022-06-09 PROCEDURE — 36415 COLL VENOUS BLD VENIPUNCTURE: CPT

## 2022-06-09 PROCEDURE — 85730 THROMBOPLASTIN TIME PARTIAL: CPT

## 2022-06-09 PROCEDURE — 85610 PROTHROMBIN TIME: CPT

## 2022-06-09 PROCEDURE — 85025 COMPLETE CBC W/AUTO DIFF WBC: CPT

## 2022-06-09 PROCEDURE — 83735 ASSAY OF MAGNESIUM: CPT

## 2022-06-09 RX ORDER — POTASSIUM CHLORIDE 20 MEQ
40 PACKET (EA) ORAL ONCE
Refills: 0 | Status: COMPLETED | OUTPATIENT
Start: 2022-06-09 | End: 2022-06-09

## 2022-06-09 RX ORDER — SODIUM CHLORIDE 9 MG/ML
500 INJECTION INTRAMUSCULAR; INTRAVENOUS; SUBCUTANEOUS ONCE
Refills: 0 | Status: COMPLETED | OUTPATIENT
Start: 2022-06-09 | End: 2022-06-09

## 2022-06-09 RX ORDER — MIRTAZAPINE 45 MG/1
1 TABLET, ORALLY DISINTEGRATING ORAL
Qty: 3 | Refills: 0
Start: 2022-06-09

## 2022-06-09 RX ORDER — TRAZODONE HCL 50 MG
1 TABLET ORAL
Qty: 3 | Refills: 0
Start: 2022-06-09 | End: 2022-06-11

## 2022-06-09 RX ORDER — CELECOXIB 200 MG/1
1 CAPSULE ORAL
Qty: 6 | Refills: 0
Start: 2022-06-09 | End: 2022-06-11

## 2022-06-09 RX ADMIN — SODIUM CHLORIDE 1000 MILLILITER(S): 9 INJECTION INTRAMUSCULAR; INTRAVENOUS; SUBCUTANEOUS at 14:19

## 2022-06-09 RX ADMIN — Medication 40 MILLIEQUIVALENT(S): at 14:51

## 2022-06-09 RX ADMIN — SODIUM CHLORIDE 1000 MILLILITER(S): 9 INJECTION INTRAMUSCULAR; INTRAVENOUS; SUBCUTANEOUS at 13:52

## 2022-06-09 NOTE — ED ADULT NURSE NOTE - NS_NURSE_DISC_TEACHING_YN_ED_ALL_ED
MEDICARE WELLNESS VISIT NOTE      HISTORY OF PRESENT ILLNESS:   Margaret Garcia presents for her Subsequent Annual Medicare Wellness Visit.   She has complaints or concerns which include see H&P.      Patient Care Team:  Ashlee Smith MD as PCP - General (Internal Medicine)  Tobias Marquez MD (Orthopedic Surgery)  Snow Banegas MD (Dermatology)  Sixto Devine MD as Cardiologist (Internal Medicine- Interventional Cardiology)  Jose Good MD (Anesthesiology - Pain Medicine)        Patient Active Problem List   Diagnosis   • Backache, unspecified   • Acquired spondylolisthesis   • Dysuria   • Hypothyroid   • Hypertension   • Dyslipidemia   • Spinal stenosis   • Hyperlipidemia   • Fibrocystic breast disease   • GERD (gastroesophageal reflux disease)   • Sleep apnea   • Primary osteoarthritis of right hip   • S/P Right Total Hip Replacement & Grade 3 Abductor Repair    • Sciatica   • Seizure disorder (CMS/[facility])   • Transient cerebral ischemia   • Bilateral carotid artery disease (CMS/[facility])   • Major depressive disorder, recurrent episode, in full remission (CMS/[facility])   • Osteopenia of multiple sites   • Lumbosacral spondylosis without myelopathy   • DJD (degenerative joint disease)   • SI (sacroiliac) joint dysfunction   • Sacroiliac joint pain   • Lumbar radicular pain         Past Medical History:   Diagnosis Date   • Acquired spondylolisthesis 9/11/2013   • Arthritis    • Carotid artery stenosis    • Cerumen impaction     deep   • Chronic pain     back   • Depression    • Dyslipidemia    • Edema with venous insufficiency    • Fibrocystic breast disease    • GERD (gastroesophageal reflux disease)    • Graves disease    • History of colonic polyps    • HTN (hypertension)    • Hypercholesterolemia    • Hyperlipidemia    • Hypothyroidism    • Psoriasis     of the left elbow   • Reactive depression (situational)    • Sacroiliitis (CMS/[facility]) 2/19/2014   • Seizure disorder (CMS/[facility])    • Situational  depression    • Sleep apnea     cpap   • Spinal stenosis    • Tubular adenoma of colon          Past Surgical History:   Procedure Laterality Date   • Carpal tunnel release  01/01/2001    Carpal Tunnel   • Colonoscopy diagnostic N/A 11/14/2012    5yr recall    • Colonoscopy w/ polypectomy  03/23/2007    tubular adenoma   • Esophagogastroduodenoscopy transoral flex diag  11/06/2007    normal esophagus, duodenum. Gastric mucosal abnormality characterized by erythema   • Hip arthroscopy, dx Right 10/14/2016    Dr Smart   • Knee surgery  01/01/2002    Knee Open Surgery-torn cartilege   • Removal gallbladder  01/01/2002    Cholecystectomy (gallbladder)   • Remv cataract extracap insert lens  2/2012    Cataract Removal Lens Implant   • Remv cataract extracap insert lens  4/2012    Cataract Removal Lens Implant   • Total hip replacement Right 07/17/2017    Dr. Marquez - Grade 3 abductor repair   • Total knee replacement Left 01/01/2010    Dr. Herrera         Social History     Tobacco Use   • Smoking status: Former Smoker     Types: Cigarettes   • Smokeless tobacco: Never Used   • Tobacco comment: per pt. quit x20 yrs ago.   Substance Use Topics   • Alcohol use: No   • Drug use: No     Drug use:    Drug Use:    No              Family History - Reviewed    Current Outpatient Medications   Medication Sig Dispense Refill   • docusate sodium-sennosides (SENNA S) 50-8.6 MG per tablet Take 1 tablet by mouth 2 times daily as needed for Constipation. 60 tablet 1   • Na Sulfate-K Sulfate-Mg Sulf (SUPREP BOWEL PREP KIT) 17.5-3.13-1.6 GM/177ML Solution Take 177 mLs by mouth 1 time. 12 oz 0   • amLODIPine (NORVASC) 10 MG tablet TAKE 1 TABLET(10 MG) BY MOUTH DAILY 90 tablet 0   • levothyroxine (SYNTHROID, LEVOTHROID) 150 MCG tablet Take 1 tablet by mouth daily. 90 tablet 1   • fluticasone (FLONASE) 50 MCG/ACT nasal spray 2 sprays in each nostril once daily. 1 Bottle 6   • bumetanide (BUMEX) 0.5 MG tablet TAKE 1 TABLET BY MOUTH 3  DAYS A  WEEK 36 tablet 2   • losartan (COZAAR) 100 MG tablet TAKE 1 TABLET BY MOUTH  DAILY 90 tablet 0   • pantoprazole (PROTONIX) 40 MG tablet Take 1 tablet by mouth daily. 90 tablet 2   • Multiple Vitamins-Minerals (PRESERVISION AREDS) tablet Take 1 tablet by mouth 2 times daily.     • cannabidiol (CBD) Apply 1 application topically as needed (pain).     • venlafaxine XR (EFFEXOR XR) 75 MG 24 hr capsule Take 1 capsule by mouth daily. 90 capsule 2   • venlafaxine XR (EFFEXOR XR) 37.5 MG 24 hr capsule Take 1 capsule by mouth daily. 90 capsule 2   • PHENobarbital (LUMINAL) 30 MG tablet TAKE 1 TABLET (30 MG) IN  THE MORNING AND 3 TABLETS  (90 MG) IN THE EVENING 360 tablet 3   • pravastatin (PRAVACHOL) 20 MG tablet Take 1 tablet by mouth daily. 90 tablet 3   • aspirin 325 MG tablet Take 325 mg by mouth daily.     • alendronate (FOSAMAX) 70 MG tablet Take 1 tablet by mouth every 7 days. 12 tablet 3   • lidocaine (LIDOCARE) 4 % patch Place 1 patch onto the skin every 24 hours. (Patient taking differently: Place 1 patch onto the skin as needed for Pain. ) 15 patch 0   • acetaminophen (TYLENOL) 500 MG tablet Take 500 mg by mouth every 4 hours as needed for Pain.     • loratadine (CLARITIN) 10 MG tablet Take 10 mg by mouth as needed for Allergies.      • Calcium Carbonate (CALTRATE 600) 1500 MG TABS Take 1,200 mg by mouth daily. 60 tablet    • Cholecalciferol (VITAMIN D3) 2000 UNITS capsule Take 2,000 Units by mouth daily.     • folic acid (FOLATE) 400 MCG tablet Take 400 mcg by mouth daily.      • ketoconazole (NIZORAL) 2 % cream Apply topically daily. 15 g 0   • nortriptyline (PAMELOR) 10 MG capsule Take 1 capsule by mouth nightly. 30 capsule 1   • tiZANidine (ZANAFLEX) 2 MG tablet Take 1 tablet by mouth 2 times daily as needed for Muscle spasms. 60 tablet 2     No current facility-administered medications for this visit.         The following items on the Medicare Health Risk Assessment were found to be positive  3.) During the  past 4 weeks, how would you rate your health?:  Fair     5.) Do you do moderate to strenuous exercise (brisk walk) for about 20 minutes for 3 or more days per week?:  No, I usually do not exercise this much     8.) During the past 4 weeks, has your physical and emotional health limited your social activities with family, friends, neighbors, or other groups?:  Moderately     11b.) Bowel control problems:  Often     11d.) Bodily pain:  Often     11e.) Tiredness or Fatigue:  Often     11f.) Feeling stressed or overwhelmed:  Always     11g.) Anger or frustration:  Often     14.) During the past 4 weeks, was someone available to help if you needed and wanted help?:  Yes, some     15.) How confident are you that you can control and manage most of your health problems?:  Somewhat confident         Vision and Hearing screens: not performed    Advance Directive:   The patient has the following documents:  Power of  for Health Care    Cognitive Assessment: no evidence of cognitive dysfunction by direct observation    Recent PHQ 2/9 Score    PHQ 2:  Date Adult PHQ 2 Score   3/29/2019 2       PHQ 9:  Date Adult PHQ 9 Score   3/29/2019 7       DEPRESSION ASSESSMENT/PLAN:  Mild symptoms, will monitor and reevaluate.     Body mass index is 32.56 kg/m².    BMI ASSESSMENT/PLAN:  20minutes of physical activity a day     Needed Screening/Treatment:   none  Needed follow up:  None    See orders.   See Patient Instructions section.   Return in about 1 year (around 5/14/2020) for Medicare Wellness Visit.         Margaret is a 76 year old female here today alone for follow up and Medicare Wellness of chronic medical problems:    1. Medicare annual wellness visit, subsequent    2. Constipation, unspecified constipation type    3. Essential hypertension    4. Acquired hypothyroidism    5. Other hyperlipidemia    6. Osteopenia of multiple sites    7. Intertrigo    8. Spinal stenosis, unspecified spinal region    .      Overall, Margaret  states she has been doing fine and has remained in her usual state of health.  She denies chest pain, shortness of breath, PND, orthopnea, leg edema, claudications and palpitations.  Denies abdominal pain, nausea, vomiting, diarrhea and constipation.  Diet for cholesterol compliant..  Denies urinary complaints, urinary frequency, urinary urgency and pain or burning on urination.  Denies weakness, numbness, tingling, blurred vision and headaches.  Has been tolerating Pravastatin without muscle pain or muscle weakness..  Denies weight gain, weight loss, heat intolerance, cold intolerance, hair changes and skin changes. She has developed some itchy rash underneath her left breast. She also complains about constipation. This seems to be her biggest concern today. She started to be constipated around November. She even ended up in the hospital because of that. She has tried senna and MiraLAX and nothing seems to work for her. She can gets on the couple times a week small bowel movements. She has no pain no nausea or vomiting. She is going for colonoscopy on May 22 however I feel she needs to be seen by gastroenterologist first. She also suffers with spinal stenosis. She is seeing Dr. Good for that. She has been getting injections every 6 months or so. That seems to help. She just went through because move from her house to an apartment. It definitely aggravated her symptoms.  She also has been complaining about Fosamax affecting her. She feels like it's causing some of achiness when she takes the medication for the next couple days.    ALLERGIES:   Allergen Reactions   • Demerol Other (See Comments)     Out of it   • Sulfa Antibiotics RASH   • Sulfa Drugs Cross Reactors RASH     Current Outpatient Medications   Medication Sig Dispense Refill   • docusate sodium-sennosides (SENNA S) 50-8.6 MG per tablet Take 1 tablet by mouth 2 times daily as needed for Constipation. 60 tablet 1   • Na Sulfate-K Sulfate-Mg Sulf (SUPREP  BOWEL PREP KIT) 17.5-3.13-1.6 GM/177ML Solution Take 177 mLs by mouth 1 time. 12 oz 0   • amLODIPine (NORVASC) 10 MG tablet TAKE 1 TABLET(10 MG) BY MOUTH DAILY 90 tablet 0   • levothyroxine (SYNTHROID, LEVOTHROID) 150 MCG tablet Take 1 tablet by mouth daily. 90 tablet 1   • fluticasone (FLONASE) 50 MCG/ACT nasal spray 2 sprays in each nostril once daily. 1 Bottle 6   • bumetanide (BUMEX) 0.5 MG tablet TAKE 1 TABLET BY MOUTH 3  DAYS A WEEK 36 tablet 2   • losartan (COZAAR) 100 MG tablet TAKE 1 TABLET BY MOUTH  DAILY 90 tablet 0   • pantoprazole (PROTONIX) 40 MG tablet Take 1 tablet by mouth daily. 90 tablet 2   • Multiple Vitamins-Minerals (PRESERVISION AREDS) tablet Take 1 tablet by mouth 2 times daily.     • cannabidiol (CBD) Apply 1 application topically as needed (pain).     • venlafaxine XR (EFFEXOR XR) 75 MG 24 hr capsule Take 1 capsule by mouth daily. 90 capsule 2   • venlafaxine XR (EFFEXOR XR) 37.5 MG 24 hr capsule Take 1 capsule by mouth daily. 90 capsule 2   • PHENobarbital (LUMINAL) 30 MG tablet TAKE 1 TABLET (30 MG) IN  THE MORNING AND 3 TABLETS  (90 MG) IN THE EVENING 360 tablet 3   • pravastatin (PRAVACHOL) 20 MG tablet Take 1 tablet by mouth daily. 90 tablet 3   • aspirin 325 MG tablet Take 325 mg by mouth daily.     • alendronate (FOSAMAX) 70 MG tablet Take 1 tablet by mouth every 7 days. 12 tablet 3   • lidocaine (LIDOCARE) 4 % patch Place 1 patch onto the skin every 24 hours. (Patient taking differently: Place 1 patch onto the skin as needed for Pain. ) 15 patch 0   • acetaminophen (TYLENOL) 500 MG tablet Take 500 mg by mouth every 4 hours as needed for Pain.     • loratadine (CLARITIN) 10 MG tablet Take 10 mg by mouth as needed for Allergies.      • Calcium Carbonate (CALTRATE 600) 1500 MG TABS Take 1,200 mg by mouth daily. 60 tablet    • Cholecalciferol (VITAMIN D3) 2000 UNITS capsule Take 2,000 Units by mouth daily.     • folic acid (FOLATE) 400 MCG tablet Take 400 mcg by mouth daily.      •  ketoconazole (NIZORAL) 2 % cream Apply topically daily. 15 g 0   • nortriptyline (PAMELOR) 10 MG capsule Take 1 capsule by mouth nightly. 30 capsule 1   • tiZANidine (ZANAFLEX) 2 MG tablet Take 1 tablet by mouth 2 times daily as needed for Muscle spasms. 60 tablet 2     No current facility-administered medications for this visit.      Patient Active Problem List   Diagnosis   • Backache, unspecified   • Acquired spondylolisthesis   • Dysuria   • Hypothyroid   • Hypertension   • Dyslipidemia   • Spinal stenosis   • Hyperlipidemia   • Fibrocystic breast disease   • GERD (gastroesophageal reflux disease)   • Sleep apnea   • Primary osteoarthritis of right hip   • S/P Right Total Hip Replacement & Grade 3 Abductor Repair    • Sciatica   • Seizure disorder (CMS/[facility])   • Transient cerebral ischemia   • Bilateral carotid artery disease (CMS/[facility])   • Major depressive disorder, recurrent episode, in full remission (CMS/[facility])   • Osteopenia of multiple sites   • Lumbosacral spondylosis without myelopathy   • DJD (degenerative joint disease)   • SI (sacroiliac) joint dysfunction   • Sacroiliac joint pain   • Lumbar radicular pain     Past Surgical History:   Procedure Laterality Date   • Carpal tunnel release  01/01/2001    Carpal Tunnel   • Colonoscopy diagnostic N/A 11/14/2012    5yr recall    • Colonoscopy w/ polypectomy  03/23/2007    tubular adenoma   • Esophagogastroduodenoscopy transoral flex diag  11/06/2007    normal esophagus, duodenum. Gastric mucosal abnormality characterized by erythema   • Hip arthroscopy, dx Right 10/14/2016    Dr Smart   • Knee surgery  01/01/2002    Knee Open Surgery-torn cartilege   • Removal gallbladder  01/01/2002    Cholecystectomy (gallbladder)   • Remv cataract extracap insert lens  2/2012    Cataract Removal Lens Implant   • Remv cataract extracap insert lens  4/2012    Cataract Removal Lens Implant   • Total hip replacement Right 07/17/2017    Dr. Marquez - Grade 3 abductor repair   •  Total knee replacement Left 01/01/2010    Dr. Herrera     Social History     Socioeconomic History   • Marital status:      Spouse name: Not on file   • Number of children: Not on file   • Years of education: Not on file   • Highest education level: Not on file   Occupational History   • Not on file   Social Needs   • Financial resource strain: Not on file   • Food insecurity:     Worry: Not on file     Inability: Not on file   • Transportation needs:     Medical: Not on file     Non-medical: Not on file   Tobacco Use   • Smoking status: Former Smoker     Types: Cigarettes   • Smokeless tobacco: Never Used   • Tobacco comment: per pt. quit x20 yrs ago.   Substance and Sexual Activity   • Alcohol use: No   • Drug use: No   • Sexual activity: Not Currently   Lifestyle   • Physical activity:     Days per week: Not on file     Minutes per session: Not on file   • Stress: Not on file   Relationships   • Social connections:     Talks on phone: Not on file     Gets together: Not on file     Attends [employer] service: Not on file     Active member of club or organization: Not on file     Attends meetings of clubs or organizations: Not on file     Relationship status: Not on file   • Intimate partner violence:     Fear of current or ex partner: Not on file     Emotionally abused: Not on file     Physically abused: Not on file     Forced sexual activity: Not on file   Other Topics Concern   • Not on file   Social History Narrative   • Not on file     Family History   Problem Relation Age of Onset   • Cancer Mother         gallbladder   • Cancer Daughter         breast   • Cancer Other         lung-grandfather   • Cancer Other         squamous cell- grandfather   • Cancer Other         cousin- oral?   • Cancer Maternal Aunt         colon    • Cancer Other         cousin, colon    • Cancer Daughter         breast       REVIEW OF SYSTEMS:  10 point review of systems was completed, pertinent positives and negatives are  documented as above.     PHYSICAL EXAMINATION:    Visit Vitals  /82   Pulse 95   Temp 97.7 °F (36.5 °C)   Ht 5' 2\" (1.575 m)   Wt 80.7 kg   SpO2 96%   BMI 32.56 kg/m²     GENERAL APPEARANCE:  Patient is alert, pleasant, comfortable and in no acute distress. Ambulates with a cane.  EYES:  Eyelids without swelling or erythema: no conjunctival injection, anicteric sclerae; both pupils are round and reactive to light and accommodation.  NECK:  Neck is supple and without masses.  Trachea in midline.  LUNGS:  Clear to auscultation. Good air entry, good respiratory effort.  HEART:  Normal S1, S2.  Regular rate and rhythm.  No S3 gallop. No murmurs. No bipedal edema.  SKIN: Clear, warm, dry without rash or ulcers. Underneath her left breast she has got pinkies macule with small satellite lesions.  EXTREMITIES:  No joint erythema or joint swelling in both lower extremities.  Antalgic gait.   LAB RESULTS:    No visits with results within 1 Month(s) from this visit.   Latest known visit with results is:   Admission on 01/10/2019, Discharged on 01/11/2019   Component Date Value Ref Range Status   • Systolic Blood Pressure 01/10/2019 174   Final   • Diastolic Blood Pressure 01/10/2019 77   Final   • Ventricular Rate EKG/Min (BPM) 01/10/2019 88   Final   • Atrial Rate (BPM) 01/10/2019 88   Final   • NE-Interval (MSEC) 01/10/2019 132   Final   • QRS-Interval (MSEC) 01/10/2019 84   Final   • QT-Interval (MSEC) 01/10/2019 350   Final   • QTc 01/10/2019 423   Final   • P Axis (Degrees) 01/10/2019 75   Final   • R Axis (Degrees) 01/10/2019 58   Final   • T Axis (Degrees) 01/10/2019 77   Final   • REPORT TEXT 01/10/2019    Final                    Value:Normal sinus rhythm  Cannot rule out  Anterior infarct  , age undetermined  Abnormal ECG  When compared with ECG of  29-JUN-2017 12:25,  No significant change was found  Confirmed by MARILOU WILLIS, HECTOR GIL ([id number]),  Trudi Marin ([id number]) on 1/10/2019 7:49:02 PM     •  WBC 01/10/2019 6.7  4.2 - 11.0 K/mcL Final   • RBC 01/10/2019 4.19  4.00 - 5.20 mil/mcL Final   • HGB 01/10/2019 13.8  12.0 - 15.5 g/dL Final   • HCT 01/10/2019 41.0  36.0 - 46.5 % Final   • MCV 01/10/2019 97.9  78.0 - 100.0 fl Final   • MCH 01/10/2019 32.9  26.0 - 34.0 pg Final   • MCHC 01/10/2019 33.7  32.0 - 36.5 g/dL Final   • RDW-CV 01/10/2019 12.8  11.0 - 15.0 % Final   • PLT 01/10/2019 241  140 - 450 K/mcL Final   • NRBC 01/10/2019 0  0 /100 WBC Final   • DIFF TYPE 01/10/2019 AUTOMATED DIFFERENTIAL   Final   • Neutrophil 01/10/2019 64  % Final   • LYMPH 01/10/2019 26  % Final   • MONO 01/10/2019 8  % Final   • EOSIN 01/10/2019 1  % Final   • BASO 01/10/2019 1  % Final   • Percent Immature Granuloctyes 01/10/2019 0  % Final   • Absolute Neutrophil 01/10/2019 4.3  1.8 - 7.7 K/mcL Final   • Absolute Lymph 01/10/2019 1.7  1.0 - 4.0 K/mcL Final   • Absolute Mono 01/10/2019 0.5  0.3 - 0.9 K/mcL Final   • Absolute Eos 01/10/2019 0.1  0.1 - 0.5 K/mcL Final   • Absolute Baso 01/10/2019 0.0  0.0 - 0.3 K/mcL Final   • Absolute Immature Granulocytes 01/10/2019 0.0  0 - 0.2 K/mcl Final   • Sodium 01/10/2019 140  135 - 145 mmol/L Final   • Potassium 01/10/2019 3.5  3.4 - 5.1 mmol/L Final   • Chloride 01/10/2019 104  98 - 107 mmol/L Final   • Carbon Dioxide 01/10/2019 29  21 - 32 mmol/L Final   • Anion Gap 01/10/2019 10  10 - 20 mmol/L Final   • Glucose 01/10/2019 87  65 - 99 mg/dL Final   • BUN 01/10/2019 10  6 - 20 mg/dL Final   • Creatinine 01/10/2019 0.57  0.51 - 0.95 mg/dL Final   • GFR Estimate,  01/10/2019 >90   Final    eGFR results = or >90 mL/min/1.73m2 = Normal kidney function.   • GFR Estimate, Non  01/10/2019 89   Final    eGFR 60 - 89 mL/min/1.73m2 = Mild decrease in kidney function.   • BUN/Creatinine Ratio 01/10/2019 18  7 - 25 Final   • CALCIUM 01/10/2019 8.7  8.4 - 10.2 mg/dL Final   • TOTAL BILIRUBIN 01/10/2019 0.4  0.2 - 1.0 mg/dL Final   • AST/SGOT 01/10/2019 19  <38  Units/L Final   • ALT/SGPT 01/10/2019 29  <79 Units/L Final   • ALK PHOSPHATASE 01/10/2019 71  45 - 117 Units/L Final   • TOTAL PROTEIN 01/10/2019 7.3  6.4 - 8.2 g/dL Final   • Albumin 01/10/2019 3.5* 3.6 - 5.1 g/dL Final   • GLOBULIN 01/10/2019 3.8  2.0 - 4.0 g/dL Final   • A/G Ratio, Serum 01/10/2019 0.9* 1.0 - 2.4 Final   • Lipase 01/10/2019 96  73 - 393 Units/L Final   • Troponin I POC 01/10/2019 <0.10  <0.10 ng/mL Final   • REPORT TEXT 01/11/2019    Preliminary                    Value:see dictated report for stress test results  dr jefferson attending      • Hemoglobin A1C 01/10/2019 5.3  4.5 - 5.6 % Final    Comment:    ----DIABETIC SCREENING---  NON DIABETIC                 <5.7%  INCREASED RISK                5.7-6.4%  DIAGNOSTIC FOR DIABETES      >6.4%     ----DIABETIC CONTROL---     A1C%           eAG mg/dL  6.0            126  6.5            140  7.0            154  7.5            169  8.0            183  8.5            197  9.0            212  9.5            226  10.0           240     • TROPONIN I 01/10/2019 <0.02  <0.05 ng/mL Final   • TROPONIN I 01/10/2019 <0.02  <0.05 ng/mL Final   • Systolic Blood Pressure 01/10/2019 148   Final   • Diastolic Blood Pressure 01/10/2019 69   Final   • Ventricular Rate EKG/Min (BPM) 01/10/2019 79   Final   • Atrial Rate (BPM) 01/10/2019 79   Final   • LA-Interval (MSEC) 01/10/2019 134   Final   • QRS-Interval (MSEC) 01/10/2019 80   Final   • QT-Interval (MSEC) 01/10/2019 388   Final   • QTc 01/10/2019 444   Final   • P Axis (Degrees) 01/10/2019 29   Final   • R Axis (Degrees) 01/10/2019 26   Final   • T Axis (Degrees) 01/10/2019 57   Final   • REPORT TEXT 01/10/2019    Final                    Value:Sinus rhythm  with marked sinus arrhythmia  Cannot rule out  Anterior infarct  (cited on or before  10-HENOK-2019)  Abnormal ECG  When compared with ECG of  10-HENOK-2019 16:09,  No significant change was found  Confirmed by GATO HUSTON M.D. ([id number]) on 1/11/2019 4:23:43 PM            ASSESSMENT:  (Z00.00) Medicare annual wellness visit, subsequent  (primary encounter diagnosis)  Comment:   Plan:     (K59.00) Constipation, unspecified constipation type  Comment: Definitely she should see gastroenterology prior to colonoscopy. She is agreeable with this plan.  Plan: SERVICE TO GASTROENTEROLOGY, OFFICE/OUTPT VISIT        EST LEVEL IV            (I10) Essential hypertension  Comment: Well-controlled continue present management  Plan: OFFICE/OUTPT VISIT EST LEVEL IV, BASIC         METABOLIC PANEL, LIPID PANEL WITH REFLEX,         HEPATIC FUNCTION PANEL            (E03.9) Acquired hypothyroidism  Comment: She hasn't had her thyroid checked for a year I would like to recheck it today. That could be a reason for her constipation.  Plan: OFFICE/OUTPT VISIT EST LEVEL IV            (E78.49) Other hyperlipidemia  Comment: Well-controlled based on previous blood work  Plan: OFFICE/OUTPT VISIT EST LEVEL IV, BASIC         METABOLIC PANEL, LIPID PANEL WITH REFLEX,         HEPATIC FUNCTION PANEL            (M85.89) Osteopenia of multiple sites  Comment: On Fosamax continue. Calcium vitamin D and weightbearing exercise recommended.  Plan: OFFICE/OUTPT VISIT EST LEVEL IV            (L30.4) Intertrigo  Comment: Most probably yeast infection patient was encouraged to try Nizoral cream.  Plan: ketoconazole (NIZORAL) 2 % cream, OFFICE/OUTPT         VISIT EST LEVEL IV            (M48.00) Spinal stenosis, unspecified spinal region  Comment: Seeing   Plan:       PLAN:   Continue medications as documented on the current medication list above.    Lab Frequency Next Occurrence   US CAROTID DUPLEX BILATERAL Once 07/11/2019   BASIC METABOLIC PANEL Once 08/12/2019   LIPID PANEL WITH REFLEX Once 08/12/2019   HEPATIC FUNCTION PANEL Once 08/12/2019       Return in about 1 year (around 5/14/2020) for Medicare Wellness Visit.    EDUCATION:    Margaret Garcia was here today alone, and she was educated as to the above  assessment and plan.  She expressed understanding and agreement.    RETURN:   Margaret NISHANT TobiasJose is asked to return in 6 months for follow up or sooner as needed.    Ashlee Smith MD     Yes

## 2022-06-09 NOTE — ED PROVIDER NOTE - CLINICAL SUMMARY MEDICAL DECISION MAKING FREE TEXT BOX
Pt w complaints of dec PO intake, sob, well appearing, no cu Pt w complaints of dec PO intake, sob, well appearing, will screen w labs, cxr, ekg, reassess. HDS, no tachypnea, pedal edema on exam.  labs, cxr, ekg w no acute abnormality, will dc w close pmd fu, continue supportive care, Discussed with pt results of work up, strict return precautions, and need for follow up.  Pt expressed understanding and agrees with plan.

## 2022-06-09 NOTE — ED ADULT NURSE NOTE - OBJECTIVE STATEMENT
Patient is a 72 yo F with PMH of anxiety, MDD, HTN, high cholesterol, COPD, b/l ankle/knee fracture. pt arriving to ED today for c/o chest pain, sob, generalized weakness and poor PO intake x 2 weeks. pt sent to ED for further workup and evaluation. Denies n/v/d, fever, chills, cough, palpitations, syncope, falls or other complaints.

## 2022-06-09 NOTE — ED PROVIDER NOTE - NSFOLLOWUPINSTRUCTIONS_ED_ALL_ED_FT
Shortness of breath    Shortness of breath (dyspnea) means you have trouble breathing and could indicate a medical problem. Causes include lung disease, heart disease, low amount of red blood cells (anemia), poor physical fitness, being overweight, smoking, etc. Your health care provider today may not be able to find a cause for your shortness of breath after your exam. In this case, it is important to have a follow-up exam with your primary care physician as instructed. If medicines were prescribed, take them as directed for the full length of time directed. Refrain from tobacco products.    SEEK IMMEDIATE MEDICAL CARE IF YOU HAVE ANY OF THE FOLLOWING SYMPTOMS: worsening shortness of breath, chest pain, back pain, abdominal pain, fever, coughing up blood, lightheadedness/dizziness. Shortness of breath    Shortness of breath (dyspnea) means you have trouble breathing and could indicate a medical problem. Causes include lung disease, heart disease, low amount of red blood cells (anemia), poor physical fitness, being overweight, smoking, etc. Your health care provider today may not be able to find a cause for your shortness of breath after your exam. In this case, it is important to have a follow-up exam with your primary care physician as instructed. If medicines were prescribed, take them as directed for the full length of time directed. Refrain from tobacco products.    SEEK IMMEDIATE MEDICAL CARE IF YOU HAVE ANY OF THE FOLLOWING SYMPTOMS: worsening shortness of breath, chest pain, back pain, abdominal pain, fever, coughing up blood, lightheadedness/dizziness.      Weakness      Weakness is a lack of strength. You may feel weak all over your body (generalized), or you may feel weak in one specific part of your body (focal). Common causes of weakness include:  •Infection and immune system disorders.      •Physical exhaustion.      •Internal bleeding or other blood loss that results in a lack of red blood cells (anemia).      •Dehydration.      •An imbalance in mineral (electrolyte) levels, such as potassium.      •Heart disease, circulation problems, or stroke.      Other causes include:  •Some medicines or cancer treatment.      •Stress, anxiety, or depression.      •Nervous system disorders.      •Thyroid disorders.      •Loss of muscle strength because of age or inactivity.      •Poor sleep quality or sleep disorders.      The cause of your weakness may not be known. Some causes of weakness can be serious, so it is important to see your health care provider.      Follow these instructions at home:    Activity     •Rest as needed.      •Try to get enough sleep. Most adults need 7–8 hours of quality sleep each night. Talk to your health care provider about how much sleep you need each night.      •Do exercises, such as arm curls and leg raises, for 30 minutes at least 2 days a week or as told by your health care provider. This helps build muscle strength.      •Consider working with a physical therapist or  who can develop an exercise plan to help you gain muscle strength.        General instructions      •Take over-the-counter and prescription medicines only as told by your health care provider.    •Eat a healthy, well-balanced diet. This includes:  •Proteins to build muscles, such as lean meats and fish.      •Fresh fruits and vegetables.      •Carbohydrates to boost energy, such as whole grains.        •Drink enough fluid to keep your urine pale yellow.      •Keep all follow-up visits as told by your health care provider. This is important.        Contact a health care provider if your weakness:    •Does not improve or gets worse.      •Affects your ability to think clearly.      •Affects your ability to do your normal daily activities.        Get help right away if you:    •Develop sudden weakness, especially on one side of your face or body.      •Have chest pain.      •Have trouble breathing or shortness of breath.      •Have problems with your vision.      •Have trouble talking or swallowing.      •Have trouble standing or walking.      •Are light-headed or lose consciousness.        Summary    •Weakness is a lack of strength. You may feel weak all over your body or just in one specific part of your body.      •Weakness can be caused by a variety of things. In some cases, the cause may be unknown.      •Rest as needed, and try to get enough sleep. Most adults need 7–8 hours of quality sleep each night.      •Eat a healthy, well-balanced diet.      This information is not intended to replace advice given to you by your health care provider. Make sure you discuss any questions you have with your health care provider.

## 2022-06-09 NOTE — ED PROVIDER NOTE - PATIENT PORTAL LINK FT
You can access the FollowMyHealth Patient Portal offered by [facility] by registering at the following website: [email or web address]. By joining [employer]’s FollowMyHealth portal, you will also be able to view your health information using other applications (apps) compatible with our system.

## 2022-06-09 NOTE — ED PROVIDER NOTE - NSFOLLOWUPCLINICS_GEN_ALL_ED_FT
[facility]  Family Medicine  [address]  [place], [place] [address]  Phone: [phone]  Fax:

## 2022-06-09 NOTE — ED ADULT TRIAGE NOTE - OTHER COMPLAINTS
patient c/o chest pain, SOB, weakness, poor PO intake x 2 weeks-- sent by PCP for "IV nourishment" as per patient

## 2022-06-09 NOTE — ED PROVIDER NOTE - OBJECTIVE STATEMENT
70 yo MDD, HTN, high cholesterol, COPD, b/l ankle/knee fracture (4 years ago at [facility]), scoliosis, presenting for fall 2 days ago and leg weakness, found to have hypokalemia (now resolved), rhabdomyolysis with complaints of poor PO intake, sob for the last two weeks. 72 yo MDD, HTN, high cholesterol, COPD, b/l ankle/knee fracture (4 years ago at [facility]), scoliosis, rhabdomyolysis with complaints of poor PO intake, sob for the last two weeks. Denies associated CP, NVD, lightheaded, diaphoresis, palpitations, cough/rhinorrhea, black/bloody stool, LE pain/swelling, focal weakness/numbness, recent travel/immobilization, abd pain, urinary complaints, f/c.

## 2022-07-05 NOTE — ED ADULT TRIAGE NOTE - BMI (KG/M2)
Return to the emergency department as needed for worsening of symptoms.  Stay well-hydrated.  Follow-up with the base physician/medical provider to regularly check your blood pressure.  I am not starting you on a new blood pressure medication today as you are going through an acute event with heat exhaustion so I do not know which her baseline blood pressure is actually running when at rest.  You may need to be started on new blood pressure medication soon.  In addition, your kidney function and electrolytes were abnormal today as well.  This could be related to your heat exhaustion, however it may be a long-term issue with your uncontrolled hypertension.  I would recommend your blood counts be repeated in the next 3 to 5 days as follow-up.   22.3

## 2022-07-11 NOTE — PATIENT PROFILE ADULT - PATIENT'S SEXUAL ORIENTATION
[FreeTextEntry1] : PVR normal, in office dip with small blood.  Will send UA CS and treat based on results.  Advised AZO PRN and adequate fluid intake.  Instructed to call with any questions or concerns and she verbalizes understanding. 
Heterosexual

## 2022-11-18 ENCOUNTER — APPOINTMENT (OUTPATIENT)
Dept: OPHTHALMOLOGY | Facility: CLINIC | Age: 72
End: 2022-11-18

## 2023-01-27 NOTE — PATIENT PROFILE ADULT - DATE OF LAST VACCINATION
Physical Therapy  Daily Treatment Note    Discharge Recommendations: Enid Peguero scored a 17/24 on the AM-PAC short mobility form. Current research shows that an AM-PAC score of 17 or less is typically not associated with a discharge to the patient's home setting. Based on the patient's AM-PAC score and their current functional mobility deficits, it is recommended that the patient have 3-5 sessions per week of Physical Therapy at d/c to increase the patient's independence. Please see assessment section for further patient specific details. Assessment:  Pt making progress with endurance, mobility and strength. Lives alone without 24 hour assist. Would benefit from continued IP PT at D/C prior to returning home. Plan is for SNF this afternoon. Equipment Needs: Defer  Other: Wheeled walker if D/Clay home     [facility]: LEVEL 3 SAFETY (If D/Clay home)  - Initial home health evaluation to occur within 24-48 hours, in patient home   - Therapy evaluations in home within 24-48 hours of discharge; including DME and home safety   - Frontload therapy 5 days, then 3x a week   - Therapy to evaluate if patient has [address] needs for personal care   -  evaluation within 24-48 hours, includes evaluation of resources and insurance to determine [place], [place], LTC, and Medicaid options      Chart Reviewed: Yes   Restrictions/Precautions: Fall Risk (high fall risk) Other position/activity restrictions: 1)  Ambulate in room progressing to hallway with assistive device four times daily (including PT) when PT advises. 2)  Bathroom privileges with assistance. 3) Up in bedside chair at least TID as tolerated; full WBAT; adult diet - regular   Additional Pertinent Hx: left TKA 1/24/23; PMH: HTN, COPD, GERD, peptic ulcer, sleep apnea      Diagnosis: left TKA   Treatment Diagnosis: impaired functional mobility    Subjective: Pt in chair initially.  Asking to get dressed and use the bathroom prior to being D/Clay this afternoon. Nephew arrived during session. Pain: c/o chronic neck discomfort and L knee surgical pain. Not rated. States knee pain is some better than in AM. Ice packs to knee at end of session. Objective:    Transfers  Sit to stand: CGA from chair (x 4 times); Min assist x 1 from 3:1 commode (over toilet). Effortful. Stand to sit: CGA into chair (x 3 times); CGA onto 3:1 commode (over toilet). CGA into wheelchair. Other: Decreased eccentric control with all of these stand > sit transfers. Pt tends to \"plop\" down onto surface. Ambulation  Assistance Level: CGA  Assistive device: Wheeled walker  Distance: 20 ft, 4 ft, 8 ft (to bathroom, sink, wheelchair)  Quality of gait: Step-to pattern; antalgic L LE; effortful; decreased pace; no LOB noted  Other: Distance limited by wheelchair arriving for D/C. Balance  Static stance with wheeled walker SBA  Ambulation with wheeled walker CGA  Sat on commode >5\"  with Supervision  Stood at sink to wash/dry hands with CGA    Safety Devices  Pt left in wheelchair with nephew and RN present.      AM-PAC score  AM-PAC Inpatient Mobility Raw Score : 17  AM-PAC Inpatient T-Scale Score : 42.13  Mobility Inpatient CMS 0-100% Score: 50.57  Mobility Inpatient CMS G-Code Modifier : CK    Goals: (as determined and assessed by primary PT)  Time Frame for Short Term Goals: discharge - all goals ongoing 1/25  Short Term Goal 1: Pt. will demonstrate modified (I) bed mobility   Short Term Goal 2: Pt. will demonstrate sit <-> stand modified (I) with LRAD   Short Term Goal 3: Pt. will demonstrate stand pivot modified (I) with LRAD  Short Term Goal 4: Pt. will ambulate >/= 150ft with LRAD and (S)  Short Term Goal 5: Pt. will demonstrate 0 to 90 degrees left knee flexion    Plan:  Plan: 2 times a day 7 days a week   Current Treatment Recommendations: Strengthening, ROM, Balance training, Functional mobility training, Endurance training, Gait training, Pain management, Home exercise program, Safety education & training, Patient/Caregiver education & training, Equipment evaluation, education, & procurement, Therapeutic activities    Therapy Time    Individual  Concurrent  Group  Co-treatment    Time In  1315            Time Out  1355            Minutes  40              Timed Code Treatment Minutes: 40  Total Treatment Minutes: 40    Pt is being D/Clay to SNF this afternoon. Will continue per plan of care if D/C is delayed/cancelled.      [place], [place]4[address] 09-Aug-2021

## 2023-02-09 NOTE — PROGRESS NOTE BEHAVIORAL HEALTH - NS ED [facility] MED ROS PSYCHIATRIC
See HPI
as tolerated/Yes
See HPI

## 2023-08-03 NOTE — ED ADULT NURSE NOTE - NS ED NURSE DISCH DISPOSITION
Cephalexin Pregnancy And Lactation Text: This medication is Pregnancy Category B and considered safe during pregnancy.  It is also excreted in breast milk but can be used safely for shorter doses. Admitted

## 2024-04-22 NOTE — ED ADULT NURSE NOTE - ATTEMPT TO OOB
[] : Resident [FreeTextEntry3] : We had an extensive discussion of the patient's imaging, labs, and pathology, and reviewed them together; I independently evaluated these and discussed their significance with the patient and family. I have also been involved in the multidisciplinary discussion of her care with her other providers. We discussed the natural history of DCIS and its management. [Time Spent: ___ minutes] : I have spent [unfilled] minutes of time on the encounter. no

## 2024-05-15 NOTE — ED PROVIDER NOTE - NS ED MD DISPO SPECIAL CONSIDERATION1
Resume home antihypertensive regimen including Lasix, losartan, atenolol   None/Fall Precaution/Geriatrics/Frailty

## 2024-08-02 NOTE — ED ADULT NURSE NOTE - OTHER COMPLAINTS
Patient reports suicidal ideations, placed on 1:1. Denies any chest pain. Reports decreased appetite and generalized weakness.
1

## 2024-09-25 NOTE — CONSULT NOTE ADULT - TIME-BASED BILLING (NON-CRITICAL CARE)
Physical Therapy    Physical Therapy Treatment    Patient Name: Raúl Jordan  MRN: [id number]  Department: [address]  Room: [address] [place] [address]  Today's Date: 9/25/2024  Time Calculation  Start Time: 1356  Stop Time: 1422  Time Calculation (min): 26 min         Assessment/Plan   PT Assessment  PT Assessment Results: Decreased strength, Decreased range of motion, Decreased endurance  Rehab Prognosis: Good  Barriers to Discharge: none  Evaluation/Treatment Tolerance: Patient tolerated treatment well  Medical Staff Made Aware: Yes  End of Session Communication: Bedside nurse  End of Session Patient Position: Alarm on (Pt left sitting at edge of bed per request, declined staying up in chair. Standing bed alarm engaged and RN made aware.)     PT Plan  Treatment/Interventions: Transfer training, Gait training, Endurance training, Strengthening, Therapeutic exercise, Therapeutic activity  PT Plan: Ongoing PT  PT Frequency: 4 times per week  PT Discharge Recommendations: Low intensity level of continued care  Equipment Recommended upon Discharge: Wheeled walker  PT Recommended Transfer Status: Stand by assist, Assistive device  PT - OK to Discharge: Yes      General Visit Information:   PT  Visit  PT Received On: 09/25/24  Response to Previous Treatment: Patient with no complaints from previous session.  General  Reason for Referral: Impaired mobility, acute hypoxic resp failure.  Referred By: Dr Cabello  Past Medical History Relevant to Rehab: CHF, DM, HTN, Rt TKA, OA  Prior to Session Communication: Bedside nurse  Patient Position Received: Up in bathroom (PCA present)  Preferred Learning Style: verbal, visual  General Comment: Cleared per nurse to see pt for therapy. Pt agreeable.    Subjective   Precautions:  Precautions  Hearing/Visual Limitations: reading glasses, decreased hearing  Medical Precautions: Fall precautions, Oxygen therapy device and L/min (1.5L oxygen)        Objective   Pain:  Pain Assessment  Pain Assessment:  0-10  0-10 (Numeric) Pain Score: 0 - No pain  Cognition:  Cognition  Overall Cognitive Status: Within Functional Limits  Orientation Level: Oriented X4     Treatments:  Therapeutic Exercise  Therapeutic Exercise Performed: Yes  Therapeutic Exercise Activity 1: Pt performed seated bilat LE ankle pumps, heel raises, LAQ, hip flexion, cas hip adduction and resisted hip abduction x15 reps each.    Ambulation/Gait Training 1  Surface 1: Level tile  Device 1: Rolling walker  Assistance 1: Close supervision  Comments/Distance (ft) 1: Pt ambulated with RW and 2L oxygen ~70' x2 close supervision. Seated rest between trials due to fatigue and a little SOB. SpO2 88%, cues for pursed lip breathing. SpO2 quickly returning to 91%.    Transfer 1  Transfer From 1: Chair with arms to  Transfer to 1: Stand  Technique 1: Sit to stand  Transfer Device 1: Walker  Transfer Level of Assistance 1: Close supervision  Transfers 2  Transfer From 2: Stand to  Transfer to 2: Sit, Bed  Technique 2: Stand to sit  Transfer Level of Assistance 2: Close supervision    Outcome Measures:  [facility] Basic Mobility  Turning from your back to your side while in a flat bed without using bedrails: None  Moving from lying on your back to sitting on the side of a flat bed without using bedrails: None  Moving to and from bed to chair (including a wheelchair): A little  Standing up from a chair using your arms (e.g. wheelchair or bedside chair): A little  To walk in hospital room: A little  Climbing 3-5 steps with railing: A little  Basic Mobility - Total Score: 20    Education Documentation  Home Exercise Program, taught by Arelis Dozier PTA at 9/25/2024  2:33 PM.  Learner: Patient  Readiness: Acceptance  Method: Explanation  Response: Verbalizes Understanding    Mobility Training, taught by Arelis Dozier PTA at 9/25/2024  2:33 PM.  Learner: Patient  Readiness: Acceptance  Method: Explanation  Response: Verbalizes Understanding    Education Comments  No comments  found.        OP EDUCATION:       Encounter Problems       Encounter Problems (Active)       Mobility       STG - Patient will ambulate 100' w/ LRAD prn and distant supervision  (Progressing)       Start:  09/24/24    Expected End:  10/07/24            STG - Patient will ascend and descend four stairs w/ supervision, 1 rail and cane prn (Progressing)       Start:  09/24/24    Expected End:  10/07/24            Pt will tolerate BLE exercises consistently to promote functional strength, ROM and endurance (Progressing)       Start:  09/24/24    Expected End:  10/07/24               PT Transfers       STG - Patient to transfer to and from sit to supine IND (Progressing)       Start:  09/24/24    Expected End:  10/07/24            STG - Patient will transfer sit to and from stand MOD I (Progressing)       Start:  09/24/24    Expected End:  10/07/24                    Time-based billing (NON-critical care)

## 2024-11-25 NOTE — BEHAVIORAL HEALTH ASSESSMENT NOTE - NSBHADMITIPDSM4_PSY_A_CORE FT
"Patient called in to update PCP on blister on back of left heel. It sounds like it is healing. It is not pink or red. It has the appearance of normal skin tone with flaky skin. She is surprised that it still hurts. Reassurance given. She will report if area looks like it is now healing and if it becomes pink, red or fills with fluid. She would like PCP to review update.  She also had question regarding Lasix as ordered by Cardiology. I reviewed the prescription with patient and PRN meaning.     Reason for Disposition   Normal painful or larger friction blister    Answer Assessment - Initial Assessment Questions  1. APPEARANCE of BLISTER: \"What does it look like?\"      It is drying up with flaky skin   2. SIZE: \"How large is the blister?\" (e.g., inches, cm or compare to coins)      Size of a quarter  3. LOCATION: \"Where are the blisters located?\"       Posterior left ankle where foot flexes   4. WHEN: \"When did the blister happen?\"      ongoing  5. CAUSE: \"What do you think caused the blister?\"      Shoe rubbed against back of heel-not wearing a sock  6. PAIN: \"Does it hurt?\" If Yes, ask: \"How bad is the pain?\"  (Scale 0-10; or none, mild, moderate, severe)      Yes, it hurts when walking mild-moderate  7. OTHER SYMPTOMS: \"Do you have any other symptoms?\" (e.g., fever)      no    Protocols used: Blister - Foot and Hand-Adult-AH    "
death of mother